# Patient Record
Sex: MALE | Race: WHITE | NOT HISPANIC OR LATINO | Employment: OTHER | URBAN - NONMETROPOLITAN AREA
[De-identification: names, ages, dates, MRNs, and addresses within clinical notes are randomized per-mention and may not be internally consistent; named-entity substitution may affect disease eponyms.]

---

## 2019-12-12 ENCOUNTER — TELEPHONE (OUTPATIENT)
Dept: CARDIOLOGY | Facility: PHYSICIAN GROUP | Age: 79
End: 2019-12-12

## 2019-12-13 ENCOUNTER — OFFICE VISIT (OUTPATIENT)
Dept: CARDIOLOGY | Facility: PHYSICIAN GROUP | Age: 79
End: 2019-12-13
Payer: MEDICARE

## 2019-12-13 VITALS
DIASTOLIC BLOOD PRESSURE: 84 MMHG | HEIGHT: 72 IN | BODY MASS INDEX: 27.09 KG/M2 | WEIGHT: 200 LBS | SYSTOLIC BLOOD PRESSURE: 116 MMHG | HEART RATE: 64 BPM | OXYGEN SATURATION: 95 %

## 2019-12-13 DIAGNOSIS — I10 ESSENTIAL HYPERTENSION: ICD-10-CM

## 2019-12-13 DIAGNOSIS — Z00.00 HEALTHCARE MAINTENANCE: ICD-10-CM

## 2019-12-13 DIAGNOSIS — R00.1 BRADYCARDIA: ICD-10-CM

## 2019-12-13 DIAGNOSIS — R55 SYNCOPE, UNSPECIFIED SYNCOPE TYPE: ICD-10-CM

## 2019-12-13 DIAGNOSIS — Z95.0 CARDIAC PACEMAKER IN SITU: ICD-10-CM

## 2019-12-13 PROCEDURE — 99204 OFFICE O/P NEW MOD 45 MIN: CPT | Performed by: INTERNAL MEDICINE

## 2019-12-13 RX ORDER — AMLODIPINE BESYLATE 10 MG/1
10 TABLET ORAL DAILY
COMMUNITY
End: 2020-03-12

## 2019-12-13 RX ORDER — LOSARTAN POTASSIUM 100 MG/1
100 TABLET ORAL DAILY
COMMUNITY
End: 2022-08-02

## 2019-12-13 RX ORDER — COVID-19 ANTIGEN TEST
KIT MISCELLANEOUS
COMMUNITY
End: 2020-03-12

## 2019-12-13 ASSESSMENT — ENCOUNTER SYMPTOMS
WEIGHT GAIN: 0
COUGH: 0
DEPRESSION: 0
FLANK PAIN: 0
DIARRHEA: 0
DYSPNEA ON EXERTION: 0
ALTERED MENTAL STATUS: 0
ABDOMINAL PAIN: 0
CLAUDICATION: 0
ORTHOPNEA: 0
SYNCOPE: 0
BACK PAIN: 0
VOMITING: 0
HEARTBURN: 0
DIZZINESS: 0
SHORTNESS OF BREATH: 0
PND: 0
NEAR-SYNCOPE: 0
BLURRED VISION: 0
IRREGULAR HEARTBEAT: 0
CONSTIPATION: 0
WEIGHT LOSS: 0
NAUSEA: 0
PALPITATIONS: 0
FEVER: 0
DECREASED APPETITE: 0

## 2019-12-14 NOTE — ASSESSMENT & PLAN NOTE
Over medicated. Light headed and orthostatic. D/c HCTZ. Continue losartan. Will check BP at home and modify accordingly.

## 2019-12-14 NOTE — PROGRESS NOTES
Cardiology Note    Chief Complaint   Patient presents with   • Bradycardia     pacemaker       History of Present Illness: Nolberto Severino is a 79 y.o. 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    Describes had been light headed and suffered syncope prior to receiving medtronic pacemaker from Dr Hill. Now resolved. Occasionally does get light headed; especially with standing. He is able to ambulate and exert without cardiac symptoms.     Review of Systems   Constitution: Negative for decreased appetite, fever, malaise/fatigue, weight gain and weight loss.   HENT: Negative for congestion and nosebleeds.    Eyes: Negative for blurred vision.   Cardiovascular: Negative for chest pain, claudication, dyspnea on exertion, irregular heartbeat, leg swelling, near-syncope, orthopnea, palpitations, paroxysmal nocturnal dyspnea and syncope.   Respiratory: Negative for cough and shortness of breath.    Endocrine: Negative for cold intolerance and heat intolerance.   Skin: Negative for rash.   Musculoskeletal: Negative for back pain.   Gastrointestinal: Negative for abdominal pain, constipation, diarrhea, heartburn, melena, nausea and vomiting.   Genitourinary: Negative for dysuria, flank pain and hematuria.   Neurological: Negative for dizziness.   Psychiatric/Behavioral: Negative for altered mental status and depression.         History reviewed. No pertinent past medical history.      History reviewed. No pertinent surgical history.      Current Outpatient Medications   Medication Sig Dispense Refill   • losartan (COZAAR) 100 MG Tab Take 100 mg by mouth every day.     • metFORMIN (GLUCOPHAGE) 500 MG Tab Take 500 mg by mouth every day.     • amLODIPine (NORVASC) 10 MG Tab Take 10 mg by mouth every day.     • Naproxen Sodium (ALEVE) 220 MG Cap Take  by mouth.     • aspirin 81 MG tablet Take 81 mg by mouth every day.       No current  facility-administered medications for this visit.          No Known Allergies      History reviewed. No pertinent family history.      Social History     Socioeconomic History   • Marital status: Not on file     Spouse name: Not on file   • Number of children: Not on file   • Years of education: Not on file   • Highest education level: Not on file   Occupational History   • Not on file   Social Needs   • Financial resource strain: Not on file   • Food insecurity:     Worry: Not on file     Inability: Not on file   • Transportation needs:     Medical: Not on file     Non-medical: Not on file   Tobacco Use   • Smoking status: Never Smoker   • Smokeless tobacco: Never Used   Substance and Sexual Activity   • Alcohol use: Not Currently   • Drug use: Never   • Sexual activity: Not on file   Lifestyle   • Physical activity:     Days per week: Not on file     Minutes per session: Not on file   • Stress: Not on file   Relationships   • Social connections:     Talks on phone: Not on file     Gets together: Not on file     Attends Catholic service: Not on file     Active member of club or organization: Not on file     Attends meetings of clubs or organizations: Not on file     Relationship status: Not on file   • Intimate partner violence:     Fear of current or ex partner: Not on file     Emotionally abused: Not on file     Physically abused: Not on file     Forced sexual activity: Not on file   Other Topics Concern   • Not on file   Social History Narrative   • Not on file         Physical Exam:  Ambulatory Vitals  /84 (BP Location: Left arm, Patient Position: Sitting, BP Cuff Size: Adult)   Pulse 64   Ht 1.829 m (6')   Wt 90.7 kg (200 lb)   SpO2 95%    BP Readings from Last 4 Encounters:   12/13/19 116/84     Weight/BMI:   Vitals:    12/13/19 1540   BP: 116/84   Weight: 90.7 kg (200 lb)   Height: 1.829 m (6')    Body mass index is 27.12 kg/m².  Wt Readings from Last 4 Encounters:   12/13/19 90.7 kg (200 lb)        Physical Exam   Constitutional: He is oriented to person, place, and time and well-developed, well-nourished, and in no distress. No distress.   HENT:   Head: Normocephalic and atraumatic.   Eyes: Pupils are equal, round, and reactive to light. Conjunctivae are normal.   Neck: Normal range of motion. Neck supple. No JVD present.   Cardiovascular: Normal rate, regular rhythm, normal heart sounds and intact distal pulses. Exam reveals no gallop and no friction rub.   No murmur heard.  Pulmonary/Chest: Effort normal and breath sounds normal. No respiratory distress. He has no wheezes. He has no rales. He exhibits no tenderness.   Abdominal: Soft. Bowel sounds are normal. He exhibits no distension.   Musculoskeletal:         General: No edema.   Neurological: He is alert and oriented to person, place, and time.   Skin: Skin is warm and dry.   Psychiatric: Affect and judgment normal.       Lab Data Review:  No results found for: CHOLSTRLTOT, LDL, HDL, TRIGLYCERIDE    No results found for: SODIUM, POTASSIUM, CHLORIDE, CO2, GLUCOSE, BUN, CREATININE, BUNCREATRAT, GLOMRATE  CrCl cannot be calculated (No successful lab value found.).  No results found for: ALKPHOSPHAT, ASTSGOT, ALTSGPT, TBILIRUBIN   No results found for: WBC, HCT  No results found for: HBA1C  No components found for: TROP      Cardiac Imaging and Procedures Review:        Medical Decision Making:  Problem List Items Addressed This Visit     Syncope     Now resolved post PPM.         Relevant Medications    losartan (COZAAR) 100 MG Tab    amLODIPine (NORVASC) 10 MG Tab    Other Relevant Orders    EKG    Cardiac pacemaker in situ     Normally functioning. Indication: SSS. Interrogate Feb.          Essential hypertension     Over medicated. Light headed and orthostatic. D/c HCTZ. Continue losartan. Will check BP at home and modify accordingly.          Relevant Medications    losartan (COZAAR) 100 MG Tab    amLODIPine (NORVASC) 10 MG Tab    Healthcare  maintenance     Check lipids and a1c.           Other Visit Diagnoses     Bradycardia        Relevant Orders    EKG        40 min spent reviewing old records. Obtained some from Dr Hill's office. Still await echo, stress, labs.    It was my pleasure to meet with Mr. Severino.

## 2019-12-16 ENCOUNTER — TELEPHONE (OUTPATIENT)
Dept: CARDIOLOGY | Facility: MEDICAL CENTER | Age: 79
End: 2019-12-16

## 2019-12-16 NOTE — TELEPHONE ENCOUNTER
[facility]- Dr. Hill's office sent a correspondence stating they do not have any echo's, stress reports or lab results under the pt.s chart. Previously received records were sent to scanning on 12/13.

## 2020-02-24 NOTE — TELEPHONE ENCOUNTER
Records received from Dr. Wilkinson ([facility]) including echo and lab results.   T: [phone]  F: [phone]    Sent to scanning.

## 2020-03-12 ENCOUNTER — OFFICE VISIT (OUTPATIENT)
Dept: CARDIOLOGY | Facility: PHYSICIAN GROUP | Age: 80
End: 2020-03-12
Payer: MEDICARE

## 2020-03-12 ENCOUNTER — NON-PROVIDER VISIT (OUTPATIENT)
Dept: CARDIOLOGY | Facility: PHYSICIAN GROUP | Age: 80
End: 2020-03-12
Payer: MEDICARE

## 2020-03-12 VITALS
DIASTOLIC BLOOD PRESSURE: 54 MMHG | HEIGHT: 72 IN | SYSTOLIC BLOOD PRESSURE: 118 MMHG | OXYGEN SATURATION: 97 % | RESPIRATION RATE: 16 BRPM | HEART RATE: 86 BPM | WEIGHT: 193 LBS | BODY MASS INDEX: 26.14 KG/M2

## 2020-03-12 VITALS
HEIGHT: 72 IN | RESPIRATION RATE: 16 BRPM | OXYGEN SATURATION: 97 % | HEART RATE: 86 BPM | DIASTOLIC BLOOD PRESSURE: 54 MMHG | BODY MASS INDEX: 26.14 KG/M2 | WEIGHT: 193 LBS | SYSTOLIC BLOOD PRESSURE: 118 MMHG

## 2020-03-12 DIAGNOSIS — I95.1 ORTHOSTASIS: ICD-10-CM

## 2020-03-12 DIAGNOSIS — I48.0 PAROXYSMAL ATRIAL FIBRILLATION (HCC): ICD-10-CM

## 2020-03-12 DIAGNOSIS — Z95.0 CARDIAC PACEMAKER IN SITU: ICD-10-CM

## 2020-03-12 DIAGNOSIS — R00.1 BRADYCARDIA: ICD-10-CM

## 2020-03-12 DIAGNOSIS — Z91.89 10 YEAR RISK OF MI OR STROKE 7.5% OR GREATER: ICD-10-CM

## 2020-03-12 DIAGNOSIS — I10 ESSENTIAL HYPERTENSION: ICD-10-CM

## 2020-03-12 DIAGNOSIS — R55 SYNCOPE, UNSPECIFIED SYNCOPE TYPE: ICD-10-CM

## 2020-03-12 PROCEDURE — 99215 OFFICE O/P EST HI 40 MIN: CPT | Mod: 25 | Performed by: INTERNAL MEDICINE

## 2020-03-12 PROCEDURE — 93279 PRGRMG DEV EVAL PM/LDLS PM: CPT | Performed by: NURSE PRACTITIONER

## 2020-03-12 RX ORDER — ACETAMINOPHEN 500 MG
750 TABLET ORAL EVERY 6 HOURS PRN
Status: DISCONTINUED | OUTPATIENT
Start: 2020-03-12 | End: 2022-11-22

## 2020-03-12 RX ORDER — ATORVASTATIN CALCIUM 40 MG/1
40 TABLET, FILM COATED ORAL
Qty: 90 TAB | Refills: 3 | Status: SHIPPED | OUTPATIENT
Start: 2020-03-12 | End: 2022-08-02

## 2020-03-12 ASSESSMENT — ENCOUNTER SYMPTOMS
DYSPNEA ON EXERTION: 0
FLANK PAIN: 0
CONSTIPATION: 0
CLAUDICATION: 0
NAUSEA: 0
SYNCOPE: 0
DEPRESSION: 0
HEARTBURN: 0
COUGH: 0
NEAR-SYNCOPE: 0
ALTERED MENTAL STATUS: 0
PALPITATIONS: 0
WEIGHT LOSS: 0
FEVER: 0
DECREASED APPETITE: 0
VOMITING: 0
DIZZINESS: 1
WEIGHT GAIN: 0
ORTHOPNEA: 0
ABDOMINAL PAIN: 0
BLURRED VISION: 0
IRREGULAR HEARTBEAT: 0
SHORTNESS OF BREATH: 0
PND: 0
BACK PAIN: 0
DIARRHEA: 0

## 2020-03-12 NOTE — PROGRESS NOTES
"Cardiology Note    Chief Complaint   Patient presents with   • Syncope       History of Present Illness: Nolberto Severino is a 79 y.o. 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    Feeling well, however, still having some complaints of orthostasis. Denies syncope since ppm. No other active cardiac complaints. Compliant with medications. Received his outpatient information.    Hx syncope prior to receiving medtronic pacemaker from Dr Hill. Patient now adds received only one \"wire\" because top chambers fibrillating. Was not started on anticoagulation by his prior doctors.    Review of Systems   Constitution: Negative for decreased appetite, fever, malaise/fatigue, weight gain and weight loss.   HENT: Negative for congestion and nosebleeds.    Eyes: Negative for blurred vision.   Cardiovascular: Negative for chest pain, claudication, dyspnea on exertion, irregular heartbeat, leg swelling, near-syncope, orthopnea, palpitations, paroxysmal nocturnal dyspnea and syncope.   Respiratory: Negative for cough and shortness of breath.    Endocrine: Negative for cold intolerance and heat intolerance.   Skin: Negative for rash.   Musculoskeletal: Negative for back pain.   Gastrointestinal: Negative for abdominal pain, constipation, diarrhea, heartburn, melena, nausea and vomiting.   Genitourinary: Negative for dysuria, flank pain and hematuria.   Neurological: Positive for dizziness.   Psychiatric/Behavioral: Negative for altered mental status and depression.         Past Medical History:   Diagnosis Date   • Bradycardia    • Hypertension    • Syncope          Past Surgical History:   Procedure Laterality Date   • PACEMAKER INSERTION Left 02/08/2019    Medtronic Advisa SR MRI [id number] implanted in [place].         Current Outpatient Medications   Medication Sig Dispense Refill   • apixaban (ELIQUIS) 5mg Tab Take 0.5 Tabs by mouth 2 Times a Day. 180 Tab 3   • " atorvastatin (LIPITOR) 40 MG Tab Take 1 Tab by mouth every bedtime. 90 Tab 3   • losartan (COZAAR) 100 MG Tab Take 100 mg by mouth every day.     • metFORMIN (GLUCOPHAGE) 500 MG Tab Take 500 mg by mouth every day.       Current Facility-Administered Medications   Medication Dose Route Frequency Provider Last Rate Last Dose   • acetaminophen (TYLENOL) tablet 750 mg  750 mg Oral Q6HRS PRN Sergio Garner M.D.             No Known Allergies      No family history on file.      Social History     Socioeconomic History   • Marital status: Not on file     Spouse name: Not on file   • Number of children: Not on file   • Years of education: Not on file   • Highest education level: Not on file   Occupational History   • Not on file   Social Needs   • Financial resource strain: Not on file   • Food insecurity     Worry: Not on file     Inability: Not on file   • Transportation needs     Medical: Not on file     Non-medical: Not on file   Tobacco Use   • Smoking status: Never Smoker   • Smokeless tobacco: Never Used   Substance and Sexual Activity   • Alcohol use: Not Currently   • Drug use: Never   • Sexual activity: Not on file   Lifestyle   • Physical activity     Days per week: Not on file     Minutes per session: Not on file   • Stress: Not on file   Relationships   • Social connections     Talks on phone: Not on file     Gets together: Not on file     Attends Pent[employer]al service: Not on file     Active member of club or organization: Not on file     Attends meetings of clubs or organizations: Not on file     Relationship status: Not on file   • Intimate partner violence     Fear of current or ex partner: Not on file     Emotionally abused: Not on file     Physically abused: Not on file     Forced sexual activity: Not on file   Other Topics Concern   • Not on file   Social History Narrative   • Not on file         Physical Exam:  Ambulatory Vitals  /54 (BP Location: Left arm, Patient Position: Sitting, BP Cuff Size:  Adult)   Pulse 86   Resp 16   Ht 1.829 m (6')   Wt 87.5 kg (193 lb)   SpO2 97%    BP Readings from Last 4 Encounters:   03/12/20 118/54   03/12/20 118/54   12/13/19 116/84     Weight/BMI:   Vitals:    03/12/20 1436   BP: 118/54   Weight: 87.5 kg (193 lb)   Height: 1.829 m (6')    Body mass index is 26.18 kg/m².  Wt Readings from Last 4 Encounters:   03/12/20 87.5 kg (193 lb)   03/12/20 87.5 kg (193 lb)   12/13/19 90.7 kg (200 lb)       Physical Exam   Constitutional: He is oriented to person, place, and time and well-developed, well-nourished, and in no distress. No distress.   HENT:   Head: Normocephalic and atraumatic.   Eyes: Pupils are equal, round, and reactive to light. Conjunctivae are normal.   Neck: Normal range of motion. Neck supple. No JVD present.   Cardiovascular: Normal rate, regular rhythm, normal heart sounds and intact distal pulses. Exam reveals no gallop and no friction rub.   No murmur heard.  Pulmonary/Chest: Effort normal and breath sounds normal. No respiratory distress. He has no wheezes. He has no rales. He exhibits no tenderness.   Abdominal: Soft. Bowel sounds are normal. He exhibits no distension.   Musculoskeletal:         General: No edema.   Neurological: He is alert and oriented to person, place, and time.   Skin: Skin is warm and dry.   Psychiatric: Affect and judgment normal.       Lab Data Review:  No results found for: CHOLSTRLTOT, LDL, HDL, TRIGLYCERIDE    No results found for: SODIUM, POTASSIUM, CHLORIDE, CO2, GLUCOSE, BUN, CREATININE, BUNCREATRAT, GLOMRATE  CrCl cannot be calculated (No successful lab value found.).  No results found for: ALKPHOSPHAT, ASTSGOT, ALTSGPT, TBILIRUBIN   No results found for: WBC, HCT  No results found for: HBA1C  No components found for: TROP    10/2019  a1c 8.0    Outside labs 1/2020  , HDL 37, trip 126, tot 198  Microalb/cr 1.86 wnl    Cardiac Imaging and Procedures Review:      PPM 3/12/20 interrogation advisa MRI single chamber -  normal function  Outside PPM 10/2019 interrogation  - >80%  -AS ~10%    outside EKG 12/2019 - AF,     Outside TTE 2/2019  -LVEF 55%  -mild AR  -mild MR  -mild TR  -RVSP 24 mmHg  -right atrium and left atrium dilated    Medical Decision Making:  Problem List Items Addressed This Visit     Cardiac pacemaker in situ    Essential hypertension    Relevant Medications    apixaban (ELIQUIS) 5mg Tab    atorvastatin (LIPITOR) 40 MG Tab    Bradycardia    Paroxysmal atrial fibrillation (HCC)    Relevant Medications    apixaban (ELIQUIS) 5mg Tab    atorvastatin (LIPITOR) 40 MG Tab    10 year risk of MI or stroke 7.5% or greater    Orthostasis    Relevant Medications    apixaban (ELIQUIS) 5mg Tab    atorvastatin (LIPITOR) 40 MG Tab        Paroxysmal AF - chadsvasc 4 - start eliquis 5mg bid. Check TTE. Change nsaid for pain to tylenol.    HTN - controlled however still with orthostasis. Reduce regimen. Keep track BP at home. Will reassess next visit.    10 year risk ascvd 50% / DM - add high intensity statin    It was my pleasure to meet with Simón Mere.    45 min spent reviewing outside records.

## 2020-03-12 NOTE — PATIENT INSTRUCTIONS
Goal blood pressure <130/80. Keep track at home. Call if persistently elevated.    Atrial Fibrillation  Atrial fibrillation is a type of irregular or rapid heartbeat (arrhythmia). In atrial fibrillation, the heart quivers continuously in a chaotic pattern. This occurs when parts of the heart receive disorganized signals that make the heart unable to pump blood normally. This can increase the risk for stroke, heart failure, and other heart-related conditions. There are different types of atrial fibrillation, including:  · Paroxysmal atrial fibrillation. This type starts suddenly, and it usually stops on its own shortly after it starts.  · Persistent atrial fibrillation. This type often lasts longer than a week. It may stop on its own or with treatment.  · Long-lasting persistent atrial fibrillation. This type lasts longer than 12 months.  · Permanent atrial fibrillation. This type does not go away.  Talk with your health care provider to learn about the type of atrial fibrillation that you have.  What are the causes?  This condition is caused by some heart-related conditions or procedures, including:  · A heart attack.  · Coronary artery disease.  · Heart failure.  · Heart valve conditions.  · High blood pressure.  · Inflammation of the sac that surrounds the heart (pericarditis).  · Heart surgery.  · Certain heart rhythm disorders, such as Olrenzana-Parkinson-White syndrome.  Other causes include:  · Pneumonia.  · Obstructive sleep apnea.  · Blockage of an artery in the lungs (pulmonary embolism, or PE).  · Lung cancer.  · Chronic lung disease.  · Thyroid problems, especially if the thyroid is overactive (hyperthyroidism).  · Caffeine.  · Excessive alcohol use or illegal drug use.  · Use of some medicines, including certain decongestants and diet pills.  Sometimes, the cause cannot be found.  What increases the risk?  This condition is more likely to develop in:  · People who are older in age.  · People who  smoke.  · People who have diabetes mellitus.  · People who are overweight (obese).  · Athletes who exercise vigorously.  What are the signs or symptoms?  Symptoms of this condition include:  · A feeling that your heart is beating rapidly or irregularly.  · A feeling of discomfort or pain in your chest.  · Shortness of breath.  · Sudden light-headedness or weakness.  · Getting tired easily during exercise.  In some cases, there are no symptoms.  How is this diagnosed?  Your health care provider may be able to detect atrial fibrillation when taking your pulse. If detected, this condition may be diagnosed with:  · An electrocardiogram (ECG).  · A Holter monitor test that records your heartbeat patterns over a 24-hour period.  · Transthoracic echocardiogram (TTE) to evaluate how blood flows through your heart.  · Transesophageal echocardiogram (JEANTETE) to view more detailed images of your heart.  · A stress test.  · Imaging tests, such as a CT scan or chest X-ray.  · Blood tests.  How is this treated?  The main goals of treatment are to prevent blood clots from forming and to keep your heart beating at a normal rate and rhythm. The type of treatment that you receive depends on many factors, such as your underlying medical conditions and how you feel when you are experiencing atrial fibrillation.  This condition may be treated with:  · Medicine to slow down the heart rate, bring the heart’s rhythm back to normal, or prevent clots from forming.  · Electrical cardioversion. This is a procedure that resets your heart’s rhythm by delivering a controlled, low-energy shock to the heart through your skin.  · Different types of ablation, such as catheter ablation, catheter ablation with pacemaker, or surgical ablation. These procedures destroy the heart tissues that send abnormal signals. When the pacemaker is used, it is placed under your skin to help your heart beat in a regular rhythm.  Follow these instructions at home:  · Take  over-the counter and prescription medicines only as told by your health care provider.  · If your health care provider prescribed a blood-thinning medicine (anticoagulant), take it exactly as told. Taking too much blood-thinning medicine can cause bleeding. If you do not take enough blood-thinning medicine, you will not have the protection that you need against stroke and other problems.  · Do not use tobacco products, including cigarettes, chewing tobacco, and e-cigarettes. If you need help quitting, ask your health care provider.  · If you have obstructive sleep apnea, manage your condition as told by your health care provider.  · Do not drink alcohol.  · Do not drink beverages that contain caffeine, such as coffee, soda, and tea.  · Maintain a healthy weight. Do not use diet pills unless your health care provider approves. Diet pills may make heart problems worse.  · Follow diet instructions as told by your health care provider.  · Exercise regularly as told by your health care provider.  · Keep all follow-up visits as told by your health care provider. This is important.  How is this prevented?  · Avoid drinking beverages that contain caffeine or alcohol.  · Avoid certain medicines, especially medicines that are used for breathing problems.  · Avoid certain herbs and herbal medicines, such as those that contain ephedra or ginseng.  · Do not use illegal drugs, such as cocaine and amphetamines.  · Do not smoke.  · Manage your high blood pressure.  Contact a health care provider if:  · You notice a change in the rate, rhythm, or strength of your heartbeat.  · You are taking an anticoagulant and you notice increased bruising.  · You tire more easily when you exercise or exert yourself.  Get help right away if:  · You have chest pain, abdominal pain, sweating, or weakness.  · You feel nauseous.  · You notice blood in your vomit, bowel movement, or urine.  · You have shortness of breath.  · You suddenly have swollen  feet and ankles.  · You feel dizzy.  · You have sudden weakness or numbness of the face, arm, or leg, especially on one side of the body.  · You have trouble speaking, trouble understanding, or both (aphasia).  · Your face or your eyelid droops on one side.  These symptoms may represent a serious problem that is an emergency. Do not wait to see if the symptoms will go away. Get medical help right away. Call your local emergency services (911 in the [place] Do not drive yourself to the hospital.   This information is not intended to replace advice given to you by your health care provider. Make sure you discuss any questions you have with your health care provider.  Document Released: 12/18/2006 Document Revised: 04/26/2017 Document Reviewed: 04/13/2016  Elsevier Interactive Patient Education © 2017 [employer]

## 2020-03-12 NOTE — PROGRESS NOTES
Patient had single chamber PM implanted in [place] on 2/8/2019.    Device is working normally. No ventricular high rate episodes.  Normal sensing and capture of RV lead; stable impedance. Battery longevity is 8.5 years.  No changes are made today.    FU in 6 months for next PM check with me.    Collaborating MD: Patsy

## 2020-06-12 ENCOUNTER — APPOINTMENT (OUTPATIENT)
Dept: CARDIOLOGY | Facility: PHYSICIAN GROUP | Age: 80
End: 2020-06-12
Payer: MEDICARE

## 2020-09-11 ENCOUNTER — TELEPHONE (OUTPATIENT)
Dept: CARDIOLOGY | Facility: MEDICAL CENTER | Age: 80
End: 2020-09-11

## 2020-09-11 ENCOUNTER — OFFICE VISIT (OUTPATIENT)
Dept: CARDIOLOGY | Facility: PHYSICIAN GROUP | Age: 80
End: 2020-09-11
Payer: MEDICARE

## 2020-09-11 VITALS
WEIGHT: 188 LBS | OXYGEN SATURATION: 98 % | HEART RATE: 72 BPM | HEIGHT: 72 IN | SYSTOLIC BLOOD PRESSURE: 132 MMHG | BODY MASS INDEX: 25.47 KG/M2 | DIASTOLIC BLOOD PRESSURE: 88 MMHG

## 2020-09-11 DIAGNOSIS — I10 ESSENTIAL HYPERTENSION: ICD-10-CM

## 2020-09-11 DIAGNOSIS — R00.1 BRADYCARDIA: ICD-10-CM

## 2020-09-11 DIAGNOSIS — I48.0 PAROXYSMAL ATRIAL FIBRILLATION (HCC): ICD-10-CM

## 2020-09-11 DIAGNOSIS — Z95.0 CARDIAC PACEMAKER IN SITU: ICD-10-CM

## 2020-09-11 DIAGNOSIS — Z91.89 10 YEAR RISK OF MI OR STROKE 7.5% OR GREATER: ICD-10-CM

## 2020-09-11 PROCEDURE — 99214 OFFICE O/P EST MOD 30 MIN: CPT | Performed by: INTERNAL MEDICINE

## 2020-09-11 ASSESSMENT — ENCOUNTER SYMPTOMS
DYSPNEA ON EXERTION: 0
ALTERED MENTAL STATUS: 0
HEARTBURN: 0
FLANK PAIN: 0
IRREGULAR HEARTBEAT: 0
VOMITING: 0
SYNCOPE: 0
BLURRED VISION: 0
DIARRHEA: 0
COUGH: 0
DEPRESSION: 0
CLAUDICATION: 0
CONSTIPATION: 0
WEIGHT LOSS: 0
NAUSEA: 0
SHORTNESS OF BREATH: 0
DECREASED APPETITE: 0
WEIGHT GAIN: 0
FEVER: 0
NEAR-SYNCOPE: 0
PND: 0
BACK PAIN: 0
PALPITATIONS: 0
DIZZINESS: 0
ORTHOPNEA: 0
ABDOMINAL PAIN: 0

## 2020-09-11 NOTE — TELEPHONE ENCOUNTER
Called patient to see if he has completed echocardiogram ordered by [facility], unable to reach patient and left voicemail for call back.     Faxed records request to [facility] to see if he completed it there. Fax confirmation received.

## 2020-09-11 NOTE — PROGRESS NOTES
"Cardiology Note    Chief Complaint   Patient presents with   • Atrial Fibrillation       History of Present Illness: Nolberto Severino is a 79 y.o. 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    No cardiac complaints this visit. Much improved orthostasis since stopping amlodipine. Still has rarely if bends over and gets back up. Home Bps 130/70s. He also has leg weakness causing him to waver when walking. No repeat syncope since ppm. Compliant with medications and denies adverse effects including new meds.    Hx syncope prior to receiving medtronic pacemaker from Dr Hill. Only one \"wire\" because \"top chambers fibrillating.\"     Review of Systems   Constitution: Negative for decreased appetite, fever, malaise/fatigue, weight gain and weight loss.   HENT: Negative for congestion and nosebleeds.    Eyes: Negative for blurred vision.   Cardiovascular: Negative for chest pain, claudication, dyspnea on exertion, irregular heartbeat, leg swelling, near-syncope, orthopnea, palpitations, paroxysmal nocturnal dyspnea and syncope.   Respiratory: Negative for cough and shortness of breath.    Endocrine: Negative for cold intolerance and heat intolerance.   Skin: Negative for rash.   Musculoskeletal: Negative for back pain.   Gastrointestinal: Negative for abdominal pain, constipation, diarrhea, heartburn, melena, nausea and vomiting.   Genitourinary: Negative for dysuria, flank pain and hematuria.   Neurological: Negative for dizziness.   Psychiatric/Behavioral: Negative for altered mental status and depression.         Past Medical History:   Diagnosis Date   • Bradycardia    • Hypertension    • Syncope          Past Surgical History:   Procedure Laterality Date   • PACEMAKER INSERTION Left 02/08/2019    Medtronic Advisa SR MRI [id number] implanted in [place].         Current Outpatient Medications   Medication Sig Dispense Refill "   • apixaban (ELIQUIS) 5mg Tab Take 0.5 Tabs by mouth 2 Times a Day. 180 Tab 3   • atorvastatin (LIPITOR) 40 MG Tab Take 1 Tab by mouth every bedtime. 90 Tab 3   • losartan (COZAAR) 100 MG Tab Take 100 mg by mouth every day.     • metFORMIN (GLUCOPHAGE) 500 MG Tab Take 500 mg by mouth every day.       Current Facility-Administered Medications   Medication Dose Route Frequency Provider Last Rate Last Dose   • acetaminophen (TYLENOL) tablet 750 mg  750 mg Oral Q6HRS PRN Sergio Garner M.D.             No Known Allergies      History reviewed. No pertinent family history.      Social History     Socioeconomic History   • Marital status:      Spouse name: Not on file   • Number of children: Not on file   • Years of education: Not on file   • Highest education level: Not on file   Occupational History   • Not on file   Social Needs   • Financial resource strain: Not on file   • Food insecurity     Worry: Not on file     Inability: Not on file   • Transportation needs     Medical: Not on file     Non-medical: Not on file   Tobacco Use   • Smoking status: Never Smoker   • Smokeless tobacco: Never Used   Substance and Sexual Activity   • Alcohol use: Not Currently   • Drug use: Never   • Sexual activity: Not on file   Lifestyle   • Physical activity     Days per week: Not on file     Minutes per session: Not on file   • Stress: Not on file   Relationships   • Social connections     Talks on phone: Not on file     Gets together: Not on file     Attends Christian service: Not on file     Active member of club or organization: Not on file     Attends meetings of clubs or organizations: Not on file     Relationship status: Not on file   • Intimate partner violence     Fear of current or ex partner: Not on file     Emotionally abused: Not on file     Physically abused: Not on file     Forced sexual activity: Not on file   Other Topics Concern   • Not on file   Social History Narrative   • Not on file         Physical  Exam:  Ambulatory Vitals  /88 (BP Location: Left arm, Patient Position: Sitting, BP Cuff Size: Adult)   Pulse 72   Ht 1.829 m (6')   Wt 85.3 kg (188 lb)   SpO2 98%    BP Readings from Last 4 Encounters:   09/11/20 132/88   03/12/20 118/54   03/12/20 118/54   12/13/19 116/84     Weight/BMI:   Vitals:    09/11/20 1308   BP: 132/88   Weight: 85.3 kg (188 lb)   Height: 1.829 m (6')    Body mass index is 25.5 kg/m².  Wt Readings from Last 4 Encounters:   09/11/20 85.3 kg (188 lb)   03/12/20 87.5 kg (193 lb)   03/12/20 87.5 kg (193 lb)   12/13/19 90.7 kg (200 lb)       Physical Exam   Constitutional: He is oriented to person, place, and time and well-developed, well-nourished, and in no distress. No distress.   HENT:   Head: Normocephalic and atraumatic.   Eyes: Pupils are equal, round, and reactive to light. Conjunctivae are normal.   Neck: Normal range of motion. Neck supple. No JVD present.   Cardiovascular: Normal rate, regular rhythm, normal heart sounds and intact distal pulses. Exam reveals no gallop and no friction rub.   No murmur heard.  Pulmonary/Chest: Effort normal and breath sounds normal. No respiratory distress. He has no wheezes. He has no rales. He exhibits no tenderness.   Abdominal: Soft. Bowel sounds are normal. He exhibits no distension.   Musculoskeletal:         General: No edema.   Neurological: He is alert and oriented to person, place, and time.   Skin: Skin is warm and dry.   Psychiatric: Affect and judgment normal.       Lab Data Review:  No results found for: CHOLSTRLTOT, LDL, HDL, TRIGLYCERIDE    No results found for: SODIUM, POTASSIUM, CHLORIDE, CO2, GLUCOSE, BUN, CREATININE, BUNCREATRAT, GLOMRATE  CrCl cannot be calculated (No successful lab value found.).  No results found for: ALKPHOSPHAT, ASTSGOT, ALTSGPT, TBILIRUBIN   No results found for: WBC, HCT  No results found for: HBA1C  No components found for: TROP    10/2019  a1c 8.0    Outside labs 1/2020  , HDL 37, trip  126, tot 198  Microalb/cr 1.86 wnl    Cardiac Imaging and Procedures Review:      EKG 3/16/20 reviewed by me AF, CP  outside EKG 12/2019 - AF,     Outside TTE 2/2019  -LVEF 55%  -mild AR  -mild MR  -mild TR  -RVSP 24 mmHg  -right atrium and left atrium dilated    Medical Decision Making:  Problem List Items Addressed This Visit     Cardiac pacemaker in situ    Essential hypertension    Bradycardia    Paroxysmal atrial fibrillation (HCC)    10 year risk of MI or stroke 7.5% or greater    Relevant Orders    LIPID PANEL        Persistent AF - chadsvasc 4 - eliquis 5mg bid for cva prevention.     HTN - adequate control with minimal orthostasis. Continue current regimen.    10 year risk ascvd 50% / DM - high intensity statin. Repeat lipids.    It was my pleasure to meet with Mr. Severino.

## 2020-09-11 NOTE — TELEPHONE ENCOUNTER
Received fax from [facility] stating they do not have an echocardiogram for patient.

## 2020-09-17 ENCOUNTER — APPOINTMENT (OUTPATIENT)
Dept: CARDIOLOGY | Facility: PHYSICIAN GROUP | Age: 80
End: 2020-09-17
Payer: MEDICARE

## 2020-12-03 ENCOUNTER — NON-PROVIDER VISIT (OUTPATIENT)
Dept: CARDIOLOGY | Facility: PHYSICIAN GROUP | Age: 80
End: 2020-12-03
Payer: MEDICARE

## 2020-12-03 VITALS
BODY MASS INDEX: 24.11 KG/M2 | HEIGHT: 72 IN | OXYGEN SATURATION: 97 % | HEART RATE: 92 BPM | DIASTOLIC BLOOD PRESSURE: 64 MMHG | WEIGHT: 178 LBS | SYSTOLIC BLOOD PRESSURE: 118 MMHG

## 2020-12-03 DIAGNOSIS — Z95.0 CARDIAC PACEMAKER IN SITU: ICD-10-CM

## 2020-12-03 DIAGNOSIS — I48.0 PAROXYSMAL ATRIAL FIBRILLATION (HCC): ICD-10-CM

## 2020-12-03 DIAGNOSIS — R00.1 BRADYCARDIA: ICD-10-CM

## 2020-12-03 PROCEDURE — 99999 PR NO CHARGE: CPT | Performed by: NURSE PRACTITIONER

## 2020-12-04 NOTE — PROGRESS NOTES
Patient was rescheduled due to recent hospitalization for recent positive Covid diagnosis.  YIFAN Rodríguez

## 2021-01-07 ENCOUNTER — NON-PROVIDER VISIT (OUTPATIENT)
Dept: CARDIOLOGY | Facility: PHYSICIAN GROUP | Age: 81
End: 2021-01-07
Payer: MEDICARE

## 2021-01-07 VITALS
BODY MASS INDEX: 26.41 KG/M2 | OXYGEN SATURATION: 95 % | WEIGHT: 195 LBS | SYSTOLIC BLOOD PRESSURE: 142 MMHG | HEART RATE: 82 BPM | HEIGHT: 72 IN | DIASTOLIC BLOOD PRESSURE: 68 MMHG

## 2021-01-07 DIAGNOSIS — R00.1 BRADYCARDIA: ICD-10-CM

## 2021-01-07 DIAGNOSIS — Z95.0 CARDIAC PACEMAKER IN SITU: ICD-10-CM

## 2021-01-07 DIAGNOSIS — I48.0 PAROXYSMAL ATRIAL FIBRILLATION (HCC): ICD-10-CM

## 2021-01-07 PROCEDURE — 93279 PRGRMG DEV EVAL PM/LDLS PM: CPT | Performed by: NURSE PRACTITIONER

## 2021-01-07 NOTE — PROGRESS NOTES
Device is working normally. No ventricular high rate episodes.  Normal sensing and capture of RV lead; stable impedance. Battery longevity is 8.5 years.  No changes are made today.    FU in 6 months for next PM check with me, as well as FU with Dr. Garner.

## 2021-08-05 ENCOUNTER — NON-PROVIDER VISIT (OUTPATIENT)
Dept: CARDIOLOGY | Facility: PHYSICIAN GROUP | Age: 81
End: 2021-08-05
Payer: MEDICARE

## 2021-08-05 ENCOUNTER — OFFICE VISIT (OUTPATIENT)
Dept: CARDIOLOGY | Facility: PHYSICIAN GROUP | Age: 81
End: 2021-08-05
Payer: MEDICARE

## 2021-08-05 VITALS
HEIGHT: 72 IN | BODY MASS INDEX: 28.31 KG/M2 | DIASTOLIC BLOOD PRESSURE: 60 MMHG | SYSTOLIC BLOOD PRESSURE: 118 MMHG | HEART RATE: 66 BPM | RESPIRATION RATE: 12 BRPM | WEIGHT: 209 LBS | OXYGEN SATURATION: 97 %

## 2021-08-05 VITALS
WEIGHT: 209 LBS | RESPIRATION RATE: 12 BRPM | HEIGHT: 72 IN | SYSTOLIC BLOOD PRESSURE: 118 MMHG | BODY MASS INDEX: 28.31 KG/M2 | HEART RATE: 66 BPM | OXYGEN SATURATION: 97 % | DIASTOLIC BLOOD PRESSURE: 60 MMHG

## 2021-08-05 DIAGNOSIS — R00.1 BRADYCARDIA: ICD-10-CM

## 2021-08-05 DIAGNOSIS — I10 ESSENTIAL HYPERTENSION: ICD-10-CM

## 2021-08-05 DIAGNOSIS — Z91.89 10 YEAR RISK OF MI OR STROKE 7.5% OR GREATER: ICD-10-CM

## 2021-08-05 DIAGNOSIS — I48.0 PAROXYSMAL ATRIAL FIBRILLATION (HCC): ICD-10-CM

## 2021-08-05 DIAGNOSIS — Z00.00 HEALTHCARE MAINTENANCE: ICD-10-CM

## 2021-08-05 DIAGNOSIS — Z95.0 CARDIAC PACEMAKER IN SITU: ICD-10-CM

## 2021-08-05 DIAGNOSIS — E11.9 TYPE 2 DIABETES MELLITUS WITHOUT COMPLICATION, UNSPECIFIED WHETHER LONG TERM INSULIN USE (HCC): ICD-10-CM

## 2021-08-05 PROCEDURE — 93279 PRGRMG DEV EVAL PM/LDLS PM: CPT | Performed by: NURSE PRACTITIONER

## 2021-08-05 PROCEDURE — 99214 OFFICE O/P EST MOD 30 MIN: CPT | Mod: 25 | Performed by: INTERNAL MEDICINE

## 2021-08-05 ASSESSMENT — ENCOUNTER SYMPTOMS
DYSPNEA ON EXERTION: 0
ALTERED MENTAL STATUS: 0
WEIGHT GAIN: 0
SYNCOPE: 0
ORTHOPNEA: 0
VOMITING: 0
FLANK PAIN: 0
NEAR-SYNCOPE: 0
DIZZINESS: 0
FEVER: 0
IRREGULAR HEARTBEAT: 0
BACK PAIN: 0
CLAUDICATION: 0
HEARTBURN: 0
PALPITATIONS: 0
PND: 0
COUGH: 0
DEPRESSION: 0
DECREASED APPETITE: 0
NAUSEA: 0
ABDOMINAL PAIN: 0
SHORTNESS OF BREATH: 0
DIARRHEA: 0
BLURRED VISION: 0
WEIGHT LOSS: 0
CONSTIPATION: 0

## 2021-08-05 NOTE — PROGRESS NOTES
"Cardiology Note    Chief Complaint   Patient presents with   • Hypertension     F/V Dx: Essential hypertension   • Bradycardia   • Atrial Fibrillation     F/V Dx: Paroxysmal atrial fibrillation (HCC)       History of Present Illness: Nolberto Severino is a 80 y.o. 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    [facility] admission 11/2020 for pneumonia, covid-19 positive.    Has since recovered. No active cardiac complaints. At his baseline level of ba. Has difficulty walking perfectly straight. Reminds had hip surgery and since has had this issue. Compliant with medications and denies adverse effects.   Hx syncope prior to receiving medtronic pacemaker from Dr Hill. Only one \"wire\" because \"top chambers fibrillating.\"     Review of Systems   Constitutional: Negative for decreased appetite, fever, malaise/fatigue, weight gain and weight loss.   HENT: Negative for congestion and nosebleeds.    Eyes: Negative for blurred vision.   Cardiovascular: Negative for chest pain, claudication, dyspnea on exertion, irregular heartbeat, leg swelling, near-syncope, orthopnea, palpitations, paroxysmal nocturnal dyspnea and syncope.   Respiratory: Negative for cough and shortness of breath.    Endocrine: Negative for cold intolerance and heat intolerance.   Skin: Negative for rash.   Musculoskeletal: Negative for back pain.   Gastrointestinal: Negative for abdominal pain, constipation, diarrhea, heartburn, melena, nausea and vomiting.   Genitourinary: Negative for dysuria, flank pain and hematuria.   Neurological: Negative for dizziness.   Psychiatric/Behavioral: Negative for altered mental status and depression.         Past Medical History:   Diagnosis Date   • Bradycardia    • Hypertension    • Syncope          Past Surgical History:   Procedure Laterality Date   • PACEMAKER INSERTION Left 02/08/2019    Medtronic Advisa SR MRI [id number] implanted in [place].         Current " Outpatient Medications   Medication Sig Dispense Refill   • apixaban (ELIQUIS) 5mg Tab Take 1 tablet by mouth 2 times a day. 180 tablet 1   • atorvastatin (LIPITOR) 40 MG Tab Take 1 Tab by mouth every bedtime. 90 Tab 3   • losartan (COZAAR) 100 MG Tab Take 100 mg by mouth every day.     • metFORMIN (GLUCOPHAGE) 500 MG Tab Take 500 mg by mouth every day.       Current Facility-Administered Medications   Medication Dose Route Frequency Provider Last Rate Last Admin   • acetaminophen (TYLENOL) tablet 750 mg  750 mg Oral Q6HRS PRN Sergio Garner M.D.             No Known Allergies      No family history on file.      Social History     Socioeconomic History   • Marital status:      Spouse name: Not on file   • Number of children: Not on file   • Years of education: Not on file   • Highest education level: Not on file   Occupational History   • Not on file   Tobacco Use   • Smoking status: Former Smoker     Packs/day: 0.00     Types: Cigarettes     Quit date: 2018     Years since quitting: 3.5   • Smokeless tobacco: Never Used   Substance and Sexual Activity   • Alcohol use: Not Currently   • Drug use: Never   • Sexual activity: Not on file   Other Topics Concern   • Not on file   Social History Narrative   • Not on file     Social Determinants of Health     Financial Resource Strain:    • Difficulty of Paying Living Expenses:    Food Insecurity:    • Worried About Running Out of Food in the Last Year:    • Ran Out of Food in the Last Year:    Transportation Needs:    • Lack of Transportation (Medical):    • Lack of Transportation (Non-Medical):    Physical Activity:    • Days of Exercise per Week:    • Minutes of Exercise per Session:    Stress:    • Feeling of Stress :    Social Connections:    • Frequency of Communication with Friends and Family:    • Frequency of Social Gatherings with Friends and Family:    • Attends Gnosticist Services:    • Active Member of Clubs or Organizations:    • Attends Club or  Organization Meetings:    • Marital Status:    Intimate Partner Violence:    • Fear of Current or Ex-Partner:    • Emotionally Abused:    • Physically Abused:    • Sexually Abused:          Physical Exam:  Ambulatory Vitals  /60 (BP Location: Left arm, Patient Position: Sitting, BP Cuff Size: Adult)   Pulse 66   Resp 12   Ht 1.829 m (6')   Wt 94.8 kg (209 lb)   SpO2 97%    BP Readings from Last 4 Encounters:   08/05/21 118/60   08/05/21 118/60   01/07/21 142/68   12/03/20 118/64     Weight/BMI:   Vitals:    08/05/21 1054   BP: 118/60   Weight: 94.8 kg (209 lb)   Height: 1.829 m (6')    Body mass index is 28.35 kg/m².  Wt Readings from Last 4 Encounters:   08/05/21 94.8 kg (209 lb)   08/05/21 94.8 kg (209 lb)   01/07/21 88.5 kg (195 lb)   12/03/20 80.7 kg (178 lb)       Physical Exam  Constitutional:       General: He is not in acute distress.  HENT:      Head: Normocephalic and atraumatic.   Eyes:      Conjunctiva/sclera: Conjunctivae normal.      Pupils: Pupils are equal, round, and reactive to light.   Neck:      Vascular: No JVD.   Cardiovascular:      Rate and Rhythm: Normal rate and regular rhythm.      Heart sounds: Normal heart sounds. No murmur heard.   No friction rub. No gallop.    Pulmonary:      Effort: Pulmonary effort is normal. No respiratory distress.      Breath sounds: Normal breath sounds. No wheezing or rales.   Chest:      Chest wall: No tenderness.   Abdominal:      General: Bowel sounds are normal. There is no distension.      Palpations: Abdomen is soft.   Musculoskeletal:      Cervical back: Normal range of motion and neck supple.   Skin:     General: Skin is warm and dry.   Neurological:      Mental Status: He is alert and oriented to person, place, and time.   Psychiatric:         Mood and Affect: Affect normal.         Judgment: Judgment normal.         Lab Data Review:  No results found for: CHOLSTRLTOT, LDL, HDL, TRIGLYCERIDE    No results found for: SODIUM, POTASSIUM,  CHLORIDE, CO2, GLUCOSE, BUN, CREATININE, BUNCREATRAT, GLOMRATE  CrCl cannot be calculated (No successful lab value found.).  No results found for: ALKPHOSPHAT, ASTSGOT, ALTSGPT, TBILIRUBIN   No results found for: WBC, HCT  No results found for: HBA1C  No components found for: TROP    10/2019  a1c 8.0    Outside labs 1/2020  , HDL 37, trip 126, tot 198  Microalb/cr 1.86 wnl    Cardiac Imaging and Procedures Review:      EKG 3/16/20 reviewed by me AF, CP  outside EKG 12/2019 - AF,     Outside TTE 2/2019  -LVEF 55%  -mild AR  -mild MR  -mild TR  -RVSP 24 mmHg  -right atrium and left atrium dilated    Medical Decision Making:  Problem List Items Addressed This Visit     Essential hypertension    Healthcare maintenance    Paroxysmal atrial fibrillation (HCC)    10 year risk of MI or stroke 7.5% or greater    Type 2 diabetes mellitus without complication (HCC)    Relevant Orders    Lipid Profile        Persistent AF - chadsvasc 4 - eliquis 5mg bid for cva prevention. Hasn't required rate control.    HTN - goal 120/80. Chronic. Controlled. Continue current regimen.    10 year risk ascvd high / DM - high intensity statin. Repeat lipids.    It was my pleasure to meet with Mr. Severino.

## 2021-08-05 NOTE — PATIENT INSTRUCTIONS

## 2021-08-05 NOTE — PROGRESS NOTES
Device is working normally. No ventricular high rate episodes.  Normal sensing and capture of RV lead; stable impedance. Battery longevity is 7.5 years.  No changes are made today.    He does see Dr. Garner today too.    FU in 6 months for next PM check with me.

## 2022-01-24 ENCOUNTER — TELEPHONE (OUTPATIENT)
Dept: CARDIOLOGY | Facility: MEDICAL CENTER | Age: 82
End: 2022-01-24

## 2022-01-24 NOTE — TELEPHONE ENCOUNTER
Called patient in regards to upcoming appointment scheduled on 02/10 with AB. Called regarding pending labs that were ordered per VR back in 08/2021. Patient stated he has not completed it but can complete before visit at [facility].     Printed copy of panel and physically took orders to Lab Services at [facility].     Informed patient labs are fasting and to attempt to complete before visit.   Patient verbally understood and was appreciative of call.     Confirmed appointment date, time, & location. Advised to please wear a mask and to call main office if their were any questions or concerns.

## 2022-02-10 ENCOUNTER — NON-PROVIDER VISIT (OUTPATIENT)
Dept: CARDIOLOGY | Facility: PHYSICIAN GROUP | Age: 82
End: 2022-02-10
Payer: MEDICARE

## 2022-02-10 VITALS
HEART RATE: 68 BPM | DIASTOLIC BLOOD PRESSURE: 80 MMHG | BODY MASS INDEX: 28.71 KG/M2 | OXYGEN SATURATION: 96 % | HEIGHT: 72 IN | SYSTOLIC BLOOD PRESSURE: 136 MMHG | WEIGHT: 212 LBS | RESPIRATION RATE: 16 BRPM

## 2022-02-10 DIAGNOSIS — Z79.01 CHRONIC ANTICOAGULATION: ICD-10-CM

## 2022-02-10 DIAGNOSIS — Z95.0 CARDIAC PACEMAKER IN SITU: ICD-10-CM

## 2022-02-10 DIAGNOSIS — R00.1 BRADYCARDIA: ICD-10-CM

## 2022-02-10 DIAGNOSIS — I48.0 PAROXYSMAL ATRIAL FIBRILLATION (HCC): ICD-10-CM

## 2022-02-10 PROCEDURE — 93280 PM DEVICE PROGR EVAL DUAL: CPT | Performed by: NURSE PRACTITIONER

## 2022-02-10 RX ORDER — TAMSULOSIN HYDROCHLORIDE 0.4 MG/1
0.4 CAPSULE ORAL DAILY
COMMUNITY
Start: 2022-01-12 | End: 2022-09-14

## 2022-02-10 RX ORDER — BLOOD-GLUCOSE METER
KIT MISCELLANEOUS
Status: ON HOLD | COMMUNITY
Start: 2021-12-16 | End: 2023-01-09

## 2022-02-10 RX ORDER — INSULIN ASPART 100 [IU]/ML
10 INJECTION, SOLUTION INTRAVENOUS; SUBCUTANEOUS PRN
Status: ON HOLD | COMMUNITY
Start: 2022-01-17 | End: 2023-01-09

## 2022-02-10 RX ORDER — AMLODIPINE BESYLATE 10 MG/1
10 TABLET ORAL DAILY
COMMUNITY
Start: 2022-02-03 | End: 2022-08-02

## 2022-02-10 NOTE — PROGRESS NOTES
Device is working normally. Normal sensing and capture of RV lead; stable impedance. Battery longevity is 7.5 years.  No changes are made today.    He is anticoagulated with Eliquis, but is taking it only once daily. I explained it is a twice daily medication, and he states understanding.    Follow-up in 6 months for next PM check with me, and follow-up with Dr. Garner.

## 2022-08-02 ENCOUNTER — OFFICE VISIT (OUTPATIENT)
Dept: CARDIOLOGY | Facility: MEDICAL CENTER | Age: 82
End: 2022-08-02
Payer: MEDICARE

## 2022-08-02 ENCOUNTER — TELEPHONE (OUTPATIENT)
Dept: CARDIOLOGY | Facility: MEDICAL CENTER | Age: 82
End: 2022-08-02
Payer: MEDICARE

## 2022-08-02 VITALS
WEIGHT: 191.8 LBS | RESPIRATION RATE: 18 BRPM | BODY MASS INDEX: 25.98 KG/M2 | SYSTOLIC BLOOD PRESSURE: 136 MMHG | HEIGHT: 72 IN | DIASTOLIC BLOOD PRESSURE: 84 MMHG | OXYGEN SATURATION: 96 % | HEART RATE: 74 BPM

## 2022-08-02 DIAGNOSIS — E11.9 TYPE 2 DIABETES MELLITUS WITHOUT COMPLICATION, UNSPECIFIED WHETHER LONG TERM INSULIN USE (HCC): ICD-10-CM

## 2022-08-02 DIAGNOSIS — I48.0 PAROXYSMAL ATRIAL FIBRILLATION (HCC): ICD-10-CM

## 2022-08-02 DIAGNOSIS — I25.10 CORONARY ARTERY DISEASE INVOLVING NATIVE CORONARY ARTERY OF NATIVE HEART WITHOUT ANGINA PECTORIS: ICD-10-CM

## 2022-08-02 DIAGNOSIS — I10 HTN (HYPERTENSION), MALIGNANT: ICD-10-CM

## 2022-08-02 DIAGNOSIS — Z95.5 STENTED CORONARY ARTERY: ICD-10-CM

## 2022-08-02 DIAGNOSIS — E78.2 MIXED HYPERLIPIDEMIA: ICD-10-CM

## 2022-08-02 DIAGNOSIS — Z95.0 CARDIAC PACEMAKER IN SITU: ICD-10-CM

## 2022-08-02 PROCEDURE — 99215 OFFICE O/P EST HI 40 MIN: CPT | Performed by: INTERNAL MEDICINE

## 2022-08-02 PROCEDURE — 93000 ELECTROCARDIOGRAM COMPLETE: CPT | Performed by: INTERNAL MEDICINE

## 2022-08-02 RX ORDER — FUROSEMIDE 40 MG/1
40 TABLET ORAL DAILY
COMMUNITY
End: 2022-08-24

## 2022-08-02 RX ORDER — SPIRONOLACTONE 25 MG/1
25 TABLET ORAL DAILY
COMMUNITY

## 2022-08-02 RX ORDER — CLOPIDOGREL BISULFATE 75 MG/1
75 TABLET ORAL DAILY
COMMUNITY

## 2022-08-02 RX ORDER — CARVEDILOL 3.12 MG/1
3.12 TABLET ORAL 2 TIMES DAILY WITH MEALS
COMMUNITY
End: 2022-08-02

## 2022-08-02 RX ORDER — INSULIN DEGLUDEC 100 U/ML
INJECTION, SOLUTION SUBCUTANEOUS
Status: ON HOLD | COMMUNITY
End: 2023-01-09

## 2022-08-02 RX ORDER — LISINOPRIL 5 MG/1
5 TABLET ORAL DAILY
COMMUNITY
End: 2022-08-24

## 2022-08-02 RX ORDER — DAPAGLIFLOZIN 10 MG/1
10 TABLET, FILM COATED ORAL DAILY
Qty: 90 TABLET | Refills: 3 | Status: SHIPPED | OUTPATIENT
Start: 2022-08-02

## 2022-08-02 RX ORDER — CARVEDILOL 6.25 MG/1
6.25 TABLET ORAL 2 TIMES DAILY WITH MEALS
Qty: 180 TABLET | Refills: 3 | Status: SHIPPED | OUTPATIENT
Start: 2022-08-02 | End: 2022-08-24

## 2022-08-02 RX ORDER — CARVEDILOL 6.25 MG/1
6.25 TABLET ORAL 2 TIMES DAILY WITH MEALS
Qty: 60 TABLET | Refills: 11 | Status: SHIPPED | OUTPATIENT
Start: 2022-08-02 | End: 2022-08-02 | Stop reason: SDUPTHER

## 2022-08-02 RX ORDER — GABAPENTIN 300 MG/1
600 CAPSULE ORAL 3 TIMES DAILY
COMMUNITY

## 2022-08-02 RX ORDER — PREDNISONE 20 MG/1
20 TABLET ORAL DAILY
COMMUNITY
End: 2022-08-24

## 2022-08-02 RX ORDER — ATORVASTATIN CALCIUM 80 MG/1
80 TABLET, FILM COATED ORAL NIGHTLY
COMMUNITY

## 2022-08-02 NOTE — TELEPHONE ENCOUNTER
Patient reported was at [facility] I had send Medical Records Request to   [phone]  Phone Number [phone]  Confirmation has been received.

## 2022-08-02 NOTE — PROGRESS NOTES
Chief Complaint   Patient presents with   • Bradycardia   • Atrial Fibrillation     F/V Dx: Paroxysmal atrial fibrillation (HCC)       • Hypertension     F/V Dx: Essential hypertension           Subjective     Nolberto Severino is a 81 y.o. male who presents today for cardiac care and evaluation in order cardiology clinic after recent hospitalization at [facility].  Per patient's report, he underwent coronary stenting with unknown distribution.  Patient does not have any stent card.  Patient was also told that he might of had heart failure as well.  However, at this time we do not have any information for records from [place] [facility] for review.    He denies having chest pain at this time.  Some shortness of breath.    Past Medical History:   Diagnosis Date   • Bradycardia    • Hypertension    • Syncope      Past Surgical History:   Procedure Laterality Date   • PACEMAKER INSERTION Left 2019    Medtronic Advisa SR MRI [id number] implanted in [place].     History reviewed. No pertinent family history.  Social History     Socioeconomic History   • Marital status:      Spouse name: Not on file   • Number of children: Not on file   • Years of education: Not on file   • Highest education level: Not on file   Occupational History   • Not on file   Tobacco Use   • Smoking status: Former Smoker     Packs/day: 0.00     Types: Cigarettes     Quit date: 2018     Years since quittin.5   • Smokeless tobacco: Never Used   Substance and Sexual Activity   • Alcohol use: Yes     Comment: rarely   • Drug use: Never   • Sexual activity: Not on file   Other Topics Concern   • Not on file   Social History Narrative   • Not on file     Social Determinants of Health     Financial Resource Strain: Not on file   Food Insecurity: Not on file   Transportation Needs: Not on file   Physical Activity: Not on file   Stress: Not on file   Social Connections: Not on file   Intimate Partner Violence: Not on file   Housing Stability: Not on  file     No Known Allergies  Outpatient Encounter Medications as of 8/2/2022   Medication Sig Dispense Refill   • aspirin EC (ECOTRIN) 81 MG Tablet Delayed Response Take 81 mg by mouth every day.     • atorvastatin (LIPITOR) 80 MG tablet Take 80 mg by mouth every evening.     • clopidogrel (PLAVIX) 75 MG Tab Take 75 mg by mouth every day.     • lisinopril (PRINIVIL) 5 MG Tab Take 5 mg by mouth every day.     • predniSONE (DELTASONE) 20 MG Tab Take 20 mg by mouth every day.     • spironolactone (ALDACTONE) 25 MG Tab Take 25 mg by mouth every day.     • gabapentin (NEURONTIN) 300 MG Cap Take 300 mg by mouth 2 times a day.     • Insulin Degludec 100 UNIT/ML Solution Inject  under the skin.     • furosemide (LASIX) 40 MG Tab Take 40 mg by mouth every day.     • carvedilol (COREG) 6.25 MG Tab Take 1 Tablet by mouth 2 times a day with meals. 180 Tablet 3   • dapagliflozin propanediol (FARXIGA) 10 MG Tab Take 1 Tablet by mouth every day. 90 Tablet 3   • NOVOLOG FLEXPEN 100 UNIT/ML solution for injection Inject  under the skin 3 times a day before meals.     • FREESTYLE LITE strip      • apixaban (ELIQUIS) 5mg Tab Take 1 tablet by mouth 2 times a day. 180 tablet 1   • metFORMIN (GLUCOPHAGE) 500 MG Tab Take 500 mg by mouth every day.     • [DISCONTINUED] carvedilol (COREG) 3.125 MG Tab Take 3.125 mg by mouth 2 times a day with meals.     • [DISCONTINUED] Dapagliflozin Propanediol (FARXIGA PO) Take  by mouth.     • [DISCONTINUED] carvedilol (COREG) 6.25 MG Tab Take 1 Tablet by mouth 2 times a day with meals. 60 Tablet 11   • tamsulosin (FLOMAX) 0.4 MG capsule Take 0.4 mg by mouth every day.     • [DISCONTINUED] amLODIPine (NORVASC) 10 MG Tab Take 10 mg by mouth every day. (Patient not taking: No sig reported)     • [DISCONTINUED] atorvastatin (LIPITOR) 40 MG Tab Take 1 Tab by mouth every bedtime. (Patient not taking: Reported on 8/2/2022) 90 Tab 3   • [DISCONTINUED] losartan (COZAAR) 100 MG Tab Take 100 mg by mouth every day.  (Patient not taking: No sig reported)       Facility-Administered Encounter Medications as of 8/2/2022   Medication Dose Route Frequency Provider Last Rate Last Admin   • acetaminophen (TYLENOL) tablet 750 mg  750 mg Oral Q6HRS PRN Sergio Garner M.D.         Review of Systems   Constitutional: Negative for chills, fever, malaise/fatigue and weight loss.   HENT: Negative for ear discharge, ear pain, hearing loss and nosebleeds.    Eyes: Negative for blurred vision, double vision, pain and discharge.   Respiratory: Negative for cough and shortness of breath.    Cardiovascular: Negative for chest pain, palpitations, orthopnea, claudication, leg swelling and PND.   Gastrointestinal: Negative for abdominal pain, blood in stool, melena, nausea and vomiting.   Genitourinary: Negative for dysuria and hematuria.   Musculoskeletal: Negative for falls, joint pain and myalgias.   Skin: Negative for itching and rash.   Neurological: Negative for dizziness, sensory change, speech change, loss of consciousness and headaches.   Endo/Heme/Allergies: Negative for environmental allergies. Does not bruise/bleed easily.   Psychiatric/Behavioral: Negative for depression, hallucinations and suicidal ideas.              Objective     /84 (BP Location: Left arm, Patient Position: Sitting, BP Cuff Size: Adult)   Pulse 74   Resp 18   Ht 1.829 m (6')   Wt 87 kg (191 lb 12.8 oz)   SpO2 96%   BMI 26.01 kg/m²     Physical Exam  Vitals and nursing note reviewed.   Constitutional:       General: He is not in acute distress.     Appearance: He is not diaphoretic.   HENT:      Head: Normocephalic and atraumatic.      Right Ear: External ear normal.      Left Ear: External ear normal.      Nose: No congestion or rhinorrhea.   Eyes:      General:         Right eye: No discharge.         Left eye: No discharge.   Neck:      Thyroid: No thyromegaly.      Vascular: No JVD.   Cardiovascular:      Rate and Rhythm: Normal rate and regular  rhythm.      Pulses: Normal pulses.   Pulmonary:      Effort: No respiratory distress.   Abdominal:      General: There is no distension.      Tenderness: There is no abdominal tenderness.   Musculoskeletal:         General: No swelling or tenderness.      Right lower leg: No edema.      Left lower leg: No edema.   Skin:     General: Skin is warm and dry.   Neurological:      Mental Status: He is alert and oriented to person, place, and time.      Cranial Nerves: No cranial nerve deficit.   Psychiatric:         Behavior: Behavior normal.                Assessment & Plan     1. Stented coronary artery  EC-ECHOCARDIOGRAM COMPLETE W/O CONT   2. Coronary artery disease involving native coronary artery of native heart without angina pectoris  EC-ECHOCARDIOGRAM COMPLETE W/O CONT   3. HTN (hypertension), malignant  EC-ECHOCARDIOGRAM COMPLETE W/O CONT   4. Paroxysmal atrial fibrillation (HCC)  EKG    EC-ECHOCARDIOGRAM COMPLETE W/O CONT   5. Mixed hyperlipidemia  EC-ECHOCARDIOGRAM COMPLETE W/O CONT   6. Cardiac pacemaker in situ     7. Type 2 diabetes mellitus without complication, unspecified whether long term insulin use (HCC)         Medical Decision Making: Today's Assessment/Status/Plan:   Today, based on physical examination findings, patient is euvolemic. No JVD, lungs are clear to auscultation, no pitting edema in bilateral lower extremities, no ascites.    Dry weight is 191 lbs.    Will increase Carvedilol to 6.25 mg po twice daily.    I will change records from [place] for review.  This time, we will not do major medical changes until further information known.    Continue anticoagulation with Eliquis 5 mg p.o. twice a day for paroxysmal atrial fibrillation stroke risk reduction.  Continue spinal lactone 25 mg daily, Atorvastatin 80 mg daily.    Continue Farxiga 10 mg p.o. once a day    Continue dialysis of furosemide 40 mg p.o. once a day.    Continue dual antiplatelet therapy with aspirin and  clopidogrel

## 2022-08-03 LAB — EKG IMPRESSION: NORMAL

## 2022-08-03 ASSESSMENT — ENCOUNTER SYMPTOMS
VOMITING: 0
HALLUCINATIONS: 0
EYE DISCHARGE: 0
FALLS: 0
EYE PAIN: 0
SHORTNESS OF BREATH: 0
WEIGHT LOSS: 0
BLURRED VISION: 0
DEPRESSION: 0
HEADACHES: 0
ABDOMINAL PAIN: 0
SENSORY CHANGE: 0
CHILLS: 0
CLAUDICATION: 0
BRUISES/BLEEDS EASILY: 0
ORTHOPNEA: 0
PND: 0
SPEECH CHANGE: 0
BLOOD IN STOOL: 0
LOSS OF CONSCIOUSNESS: 0
DOUBLE VISION: 0
MYALGIAS: 0
COUGH: 0
DIZZINESS: 0
PALPITATIONS: 0
NAUSEA: 0
FEVER: 0

## 2022-08-04 ENCOUNTER — TELEPHONE (OUTPATIENT)
Dept: CARDIOLOGY | Facility: MEDICAL CENTER | Age: 82
End: 2022-08-04
Payer: MEDICARE

## 2022-08-11 ENCOUNTER — NON-PROVIDER VISIT (OUTPATIENT)
Dept: CARDIOLOGY | Facility: PHYSICIAN GROUP | Age: 82
End: 2022-08-11
Payer: MEDICARE

## 2022-08-11 VITALS
HEART RATE: 68 BPM | WEIGHT: 194 LBS | RESPIRATION RATE: 16 BRPM | HEIGHT: 72 IN | SYSTOLIC BLOOD PRESSURE: 80 MMHG | OXYGEN SATURATION: 98 % | DIASTOLIC BLOOD PRESSURE: 50 MMHG | BODY MASS INDEX: 26.28 KG/M2

## 2022-08-11 DIAGNOSIS — R00.1 BRADYCARDIA: ICD-10-CM

## 2022-08-11 DIAGNOSIS — Z95.0 CARDIAC PACEMAKER IN SITU: ICD-10-CM

## 2022-08-11 DIAGNOSIS — I48.0 PAROXYSMAL ATRIAL FIBRILLATION (HCC): ICD-10-CM

## 2022-08-11 PROBLEM — E78.2 MIXED HYPERLIPIDEMIA: Status: ACTIVE | Noted: 2022-08-11

## 2022-08-11 PROCEDURE — 93280 PM DEVICE PROGR EVAL DUAL: CPT | Performed by: NURSE PRACTITIONER

## 2022-08-11 NOTE — PROGRESS NOTES
Device is working normally. Underlying rhythm is atrial fibrillation; he is anticoagulated with Eliquis.  Normal sensing and capture of RV lead; stable impedance. Battery longevity is 7.5 years.  No changes are made today.    Patient's wife states that his blood glucose was 400+ this morning, and he seems quite lethargic today. Urged to go to ER, to have this checked and possibly treated.    Follow-up in 6 months for next PM check with me.

## 2022-08-24 ENCOUNTER — OFFICE VISIT (OUTPATIENT)
Dept: CARDIOLOGY | Facility: MEDICAL CENTER | Age: 82
End: 2022-08-24
Payer: MEDICARE

## 2022-08-24 VITALS
SYSTOLIC BLOOD PRESSURE: 102 MMHG | HEART RATE: 81 BPM | HEIGHT: 72 IN | BODY MASS INDEX: 25.87 KG/M2 | DIASTOLIC BLOOD PRESSURE: 70 MMHG | OXYGEN SATURATION: 95 % | WEIGHT: 191 LBS | RESPIRATION RATE: 16 BRPM

## 2022-08-24 DIAGNOSIS — Z95.5 STENTED CORONARY ARTERY: ICD-10-CM

## 2022-08-24 DIAGNOSIS — I25.10 CORONARY ARTERY DISEASE INVOLVING NATIVE CORONARY ARTERY OF NATIVE HEART WITHOUT ANGINA PECTORIS: ICD-10-CM

## 2022-08-24 DIAGNOSIS — I48.11 LONGSTANDING PERSISTENT ATRIAL FIBRILLATION (HCC): ICD-10-CM

## 2022-08-24 DIAGNOSIS — I50.20 ACC/AHA STAGE C SYSTOLIC HEART FAILURE (HCC): ICD-10-CM

## 2022-08-24 DIAGNOSIS — Z95.0 CARDIAC PACEMAKER IN SITU: ICD-10-CM

## 2022-08-24 DIAGNOSIS — I10 HTN (HYPERTENSION), MALIGNANT: ICD-10-CM

## 2022-08-24 DIAGNOSIS — I51.89 LEFT VENTRICULAR SYSTOLIC DYSFUNCTION, NYHA CLASS 3: ICD-10-CM

## 2022-08-24 DIAGNOSIS — Z79.01 CHRONIC ANTICOAGULATION: ICD-10-CM

## 2022-08-24 DIAGNOSIS — E11.9 TYPE 2 DIABETES MELLITUS WITHOUT COMPLICATION, UNSPECIFIED WHETHER LONG TERM INSULIN USE (HCC): ICD-10-CM

## 2022-08-24 DIAGNOSIS — E78.2 MIXED HYPERLIPIDEMIA: ICD-10-CM

## 2022-08-24 PROCEDURE — 93000 ELECTROCARDIOGRAM COMPLETE: CPT | Performed by: INTERNAL MEDICINE

## 2022-08-24 PROCEDURE — 99215 OFFICE O/P EST HI 40 MIN: CPT | Performed by: INTERNAL MEDICINE

## 2022-08-24 RX ORDER — SACUBITRIL AND VALSARTAN 24; 26 MG/1; MG/1
1 TABLET, FILM COATED ORAL 2 TIMES DAILY
Qty: 60 TABLET | Refills: 3 | Status: SHIPPED | OUTPATIENT
Start: 2022-08-24 | End: 2022-09-14 | Stop reason: SDUPTHER

## 2022-08-24 RX ORDER — FUROSEMIDE 20 MG/1
20 TABLET ORAL 2 TIMES DAILY
Qty: 30 TABLET | Refills: 3 | Status: SHIPPED | OUTPATIENT
Start: 2022-08-24 | End: 2022-09-14

## 2022-08-24 RX ORDER — CARVEDILOL 6.25 MG/1
6.25 TABLET ORAL 2 TIMES DAILY WITH MEALS
Qty: 60 TABLET | Refills: 11 | Status: SHIPPED | OUTPATIENT
Start: 2022-08-24 | End: 2022-09-14 | Stop reason: SDUPTHER

## 2022-08-24 ASSESSMENT — ENCOUNTER SYMPTOMS
BLURRED VISION: 0
CHILLS: 0
FALLS: 0
SENSORY CHANGE: 0
SHORTNESS OF BREATH: 0
ABDOMINAL PAIN: 0
EYE PAIN: 0
SPEECH CHANGE: 0
BLOOD IN STOOL: 0
BRUISES/BLEEDS EASILY: 0
WEIGHT LOSS: 0
PND: 0
LOSS OF CONSCIOUSNESS: 0
COUGH: 0
PALPITATIONS: 0
HALLUCINATIONS: 0
ORTHOPNEA: 0
HEADACHES: 0
VOMITING: 0
DIZZINESS: 0
NAUSEA: 0
EYE DISCHARGE: 0
CLAUDICATION: 0
FEVER: 0
DEPRESSION: 0
DOUBLE VISION: 0
MYALGIAS: 0

## 2022-08-24 NOTE — PROGRESS NOTES
Chief Complaint   Patient presents with    Hypertension    Atrial Fibrillation    Hyperlipidemia       Subjective     Nolberto Severino is an 82 y.o. male who presents today for cardiac care and evaluation in order cardiology clinic after recent hospitalization at [facility].  Per patient's report, he underwent coronary stenting with unknown distribution.  He had PCI of large Ramus, residual disease in LAD. Also found to have LVEF of 25%.    He denies having chest pain at this time.      Patient still gets winded with daily living activities and exertion. No symptoms at rest.      Past Medical History:   Diagnosis Date    Bradycardia     Chronic anticoagulation     Hypertension     Paroxysmal atrial fibrillation (HCC) 2022    Echocardiogram with normal LV size, LVEF 60-65%. Normal RA, LA and RV. Mild AR, mild MR, moderate TR. RVSP 28mmHg.    Syncope      Past Surgical History:   Procedure Laterality Date    PACEMAKER INSERTION Left 2019    Medtronic Advisa SR MRI [id number] implanted in [place].     History reviewed. No pertinent family history.  Social History     Socioeconomic History    Marital status:      Spouse name: Not on file    Number of children: Not on file    Years of education: Not on file    Highest education level: Not on file   Occupational History    Not on file   Tobacco Use    Smoking status: Former     Packs/day: 0.00     Types: Cigarettes     Quit date: 2018     Years since quittin.6    Smokeless tobacco: Never   Substance and Sexual Activity    Alcohol use: Yes     Comment: rarely    Drug use: Never    Sexual activity: Not on file   Other Topics Concern    Not on file   Social History Narrative    Not on file     Social Determinants of Health     Financial Resource Strain: Not on file   Food Insecurity: Not on file   Transportation Needs: Not on file   Physical Activity: Not on file   Stress: Not on file   Social Connections: Not on file   Intimate Partner Violence: Not on file    Housing Stability: Not on file     No Known Allergies  Outpatient Encounter Medications as of 8/24/2022   Medication Sig Dispense Refill    atorvastatin (LIPITOR) 80 MG tablet Take 80 mg by mouth every evening.      clopidogrel (PLAVIX) 75 MG Tab Take 75 mg by mouth every day.      lisinopril (PRINIVIL) 5 MG Tab Take 5 mg by mouth every day.      spironolactone (ALDACTONE) 25 MG Tab Take 25 mg by mouth every day.      gabapentin (NEURONTIN) 300 MG Cap Take 300 mg by mouth 2 times a day.      Insulin Degludec 100 UNIT/ML Solution Inject  under the skin.      furosemide (LASIX) 40 MG Tab Take 40 mg by mouth every day.      carvedilol (COREG) 6.25 MG Tab Take 1 Tablet by mouth 2 times a day with meals. (Patient taking differently: Take 6.25 mg by mouth 2 times a day with meals. 3.125mg) 180 Tablet 3    dapagliflozin propanediol (FARXIGA) 10 MG Tab Take 1 Tablet by mouth every day. 90 Tablet 3    NOVOLOG FLEXPEN 100 UNIT/ML solution for injection Inject  under the skin 3 times a day before meals.      tamsulosin (FLOMAX) 0.4 MG capsule Take 0.4 mg by mouth every day.      FREESTYLE LITE strip       apixaban (ELIQUIS) 5mg Tab Take 1 tablet by mouth 2 times a day. 180 tablet 1    metFORMIN (GLUCOPHAGE) 500 MG Tab Take 500 mg by mouth every day.      predniSONE (DELTASONE) 20 MG Tab Take 20 mg by mouth every day. (Patient not taking: No sig reported)       Facility-Administered Encounter Medications as of 8/24/2022   Medication Dose Route Frequency Provider Last Rate Last Admin    acetaminophen (TYLENOL) tablet 750 mg  750 mg Oral Q6HRS PRN Sergio Garner M.D.         Review of Systems   Constitutional:  Negative for chills, fever, malaise/fatigue and weight loss.   HENT:  Negative for ear discharge, ear pain, hearing loss and nosebleeds.    Eyes:  Negative for blurred vision, double vision, pain and discharge.   Respiratory:  Negative for cough and shortness of breath.    Cardiovascular:  Negative for chest pain,  palpitations, orthopnea, claudication, leg swelling and PND.   Gastrointestinal:  Negative for abdominal pain, blood in stool, melena, nausea and vomiting.   Genitourinary:  Negative for dysuria and hematuria.   Musculoskeletal:  Negative for falls, joint pain and myalgias.   Skin:  Negative for itching and rash.   Neurological:  Negative for dizziness, sensory change, speech change, loss of consciousness and headaches.   Endo/Heme/Allergies:  Negative for environmental allergies. Does not bruise/bleed easily.   Psychiatric/Behavioral:  Negative for depression, hallucinations and suicidal ideas.             Objective     /70 (BP Location: Left arm, Patient Position: Sitting, BP Cuff Size: Adult)   Pulse 81   Resp 16   Ht 1.829 m (6')   Wt 86.6 kg (191 lb)   SpO2 95%   BMI 25.90 kg/m²     Physical Exam  Vitals and nursing note reviewed.   Constitutional:       General: He is not in acute distress.     Appearance: He is not diaphoretic.   HENT:      Head: Normocephalic and atraumatic.      Right Ear: External ear normal.      Left Ear: External ear normal.      Nose: No congestion or rhinorrhea.   Eyes:      General:         Right eye: No discharge.         Left eye: No discharge.   Neck:      Thyroid: No thyromegaly.      Vascular: No JVD.   Cardiovascular:      Rate and Rhythm: Normal rate and regular rhythm.      Pulses: Normal pulses.   Pulmonary:      Effort: No respiratory distress.   Abdominal:      General: There is no distension.      Tenderness: There is no abdominal tenderness.   Musculoskeletal:         General: No swelling or tenderness.      Right lower leg: No edema.      Left lower leg: No edema.   Skin:     General: Skin is warm and dry.   Neurological:      Mental Status: He is alert and oriented to person, place, and time.      Cranial Nerves: No cranial nerve deficit.   Psychiatric:         Behavior: Behavior normal.              Assessment & Plan     1. Cardiac pacemaker in situ         2. Stented coronary artery        3. Coronary artery disease involving native coronary artery of native heart without angina pectoris        4. Longstanding persistent atrial fibrillation (HCC)  EKG      5. HTN (hypertension), malignant        6. Mixed hyperlipidemia        7. Type 2 diabetes mellitus without complication, unspecified whether long term insulin use (HCC)        8. Chronic anticoagulation            Medical Decision Making: Today's Assessment/Status/Plan:   Today, based on physical examination findings, patient is euvolemic. No JVD, lungs are clear to auscultation, no pitting edema in bilateral lower extremities, no ascites.    Dry weight is 191 lbs.    Will continue Carvedilol 6.25 mg po twice daily.    Based on the overall clinical history and profile, patient is a good candidate for Entresto therapy (with superiority over ACE-I therapy in terms of survival benefits and prevention of future hospitalization).  We will stop ACE-I therapy.  Will start Entresto therapy at 24/26 mg po bid.  Patient was given written instruction about the protocol of initiating Entresto therapy with wash out period.    Will reduce Furosemide to 20 mg daily.    Based on recent data on SGLT2 and heart failure with reduced ejection fraction, patient will be benefited from Farxiga 10 mg p.o. once a day for further reduction in mortality and hospitalization with absolute risk reduction of 4.9%.  Therefore, I will continue patient on Farxiga 10 mg p.o. once a day.  Risks and benefits were explained to patient and patient has agreed to proceed.    Continue anticoagulation with Eliquis 5 mg p.o. twice a day for atrial fibrillation stroke risk reduction.  Continue spironolactone 25 mg daily, Atorvastatin 80 mg daily.    Continue Farxiga 10 mg p.o. once a day    Continue diuresis of furosemide 40 mg p.o. once a day.    Continue dual antiplatelet therapy with aspirin and clopidogrel, Atorvastatin 80 mg daily.    We will consider  ICD upgrade for primary prevention in 3 months if his left ventricular systolic function remains less than 35% after optimization of his evidence based heart failure medical regimen.    This patient requires highest level of care due to multiple comorbidities along with recent hospitalization of extreme, acute decompensation.  My office visit with patient today requires highest level of care and attempts to reduce future cardiovascular hospitalization along with mortality.  I personally interpreted all of patient's EKG findings tracings, transthoracic echocardiogram images, cardiac catheterization/coronary angiogram images, blood test results in order to formulate a comprehensive cardiovascular plan of care.  I spent a significant amount of time discussing with patient and family about the potential side effects of all of patient's medications along with education on the benefits of adding new medication and changing his current medical regimen to further reduce cardiovascular mortality.

## 2022-08-24 NOTE — PATIENT INSTRUCTIONS
Please stop Lisinopril today 08/24/2022.    Start Entresto 24/26 mg (twice a day) the morning of 08/27/2022 (3 days from now).    Will reduce Furosemide to 20 mg daily.    Carvedilol at 6.25 mg twice a day.

## 2022-08-25 LAB — EKG IMPRESSION: NORMAL

## 2022-08-29 ENCOUNTER — PATIENT MESSAGE (OUTPATIENT)
Dept: CARDIOLOGY | Facility: MEDICAL CENTER | Age: 82
End: 2022-08-29
Payer: MEDICARE

## 2022-08-29 ENCOUNTER — TELEPHONE (OUTPATIENT)
Dept: CARDIOLOGY | Facility: MEDICAL CENTER | Age: 82
End: 2022-08-29
Payer: MEDICARE

## 2022-08-29 NOTE — TELEPHONE ENCOUNTER
Reviewed TT's last OV note.    Called pt, [phone] to review concerns; unable to reach.  Left voicemail to return this call at their earliest convenience.    [employer] message sent requesting more information regarding concerns, awaiting pt response.

## 2022-08-29 NOTE — TELEPHONE ENCOUNTER
TT    Caller: - [facility]    Reported Symptoms: Low BP and dizziness, Mel would like Nolberto to be called, to discuss his script for;   sacubitril-valsartan (ENTRESTO) 24-26 MG Tab tablet (Order #[id number]) on 22, reports he is not dehydrated, and is concerned about his Low PB.     Recent Blood Pressure/Heart Rate Readin/60  98/72    Callback Number: [phone]    Thank you,   Elise DRUMMOND

## 2022-08-31 DIAGNOSIS — E11.9 TYPE 2 DIABETES MELLITUS WITHOUT COMPLICATION, UNSPECIFIED WHETHER LONG TERM INSULIN USE (HCC): ICD-10-CM

## 2022-08-31 DIAGNOSIS — Z79.01 CHRONIC ANTICOAGULATION: ICD-10-CM

## 2022-08-31 DIAGNOSIS — I25.10 CORONARY ARTERY DISEASE INVOLVING NATIVE CORONARY ARTERY OF NATIVE HEART WITHOUT ANGINA PECTORIS: ICD-10-CM

## 2022-08-31 DIAGNOSIS — I10 HTN (HYPERTENSION), MALIGNANT: ICD-10-CM

## 2022-08-31 DIAGNOSIS — E78.2 MIXED HYPERLIPIDEMIA: ICD-10-CM

## 2022-08-31 DIAGNOSIS — I48.11 LONGSTANDING PERSISTENT ATRIAL FIBRILLATION (HCC): ICD-10-CM

## 2022-08-31 DIAGNOSIS — Z95.0 CARDIAC PACEMAKER IN SITU: ICD-10-CM

## 2022-08-31 DIAGNOSIS — Z95.5 STENTED CORONARY ARTERY: ICD-10-CM

## 2022-09-01 NOTE — PATIENT COMMUNICATION
"To TT, pt stopped taking Entresto since Monday as his BP was in the 80's systolic.  Pt now BP is 110/70.  Please advise, thank you    =========================        2nd attempt to call pt, [phone], and s/w Elidia regarding concerns.  She states Sunday was the \"2nd day starting Entresto and BP went to 80/66, called ambulance.  He was cold and clammy and couldn't get up and paramedics said to drink more water to get blood volume up.\"  He was not admitted at the hospital at that time.   RN  Mel visited on Monday and reviewed medications.  Elidia states pt d/c lisinopril and started Entresto on Saturday.  She states pt drinks 6 glasses a day, urinating every hour; light yellow.  Elidia states pt stopped Entresto and pt BP was > 100 systolic yesterday.  Today /70.    Advised pt to increase water intake to 8 glasses of 8 ounce or 64 ounces, daily and if BP is still under >90 systolic, he may have a small salty snack to help increase BP but to make sure he does not go over 2,000mg sodium.  Reassurance given that findings will be relayed to MD for advise.  Pt verbalizes understanding and states no other concerns or questions at this time.  She is appreciative of information given.    "

## 2022-09-07 NOTE — PATIENT COMMUNICATION
**2nd attempt** To TT, pt stopped taking Entresto since Monday as his BP was in the 80's systolic.  /70 day after stopping Entresto 8/30.  Please advise, thank you

## 2022-09-08 NOTE — PATIENT COMMUNICATION
Blood Pressure Concerns  Yvan Rush M.D.  You 4 hours ago (10:38 AM)     Needs to stop Furosemide as well.     Yvan Rush M.D.      Returned pt call, [phone], to review MD recommendations, unable to reach.  Left voicemail to return this call at their earliest convenience.

## 2022-09-14 ENCOUNTER — OFFICE VISIT (OUTPATIENT)
Dept: CARDIOLOGY | Facility: MEDICAL CENTER | Age: 82
End: 2022-09-14
Payer: MEDICARE

## 2022-09-14 VITALS
HEART RATE: 78 BPM | RESPIRATION RATE: 14 BRPM | SYSTOLIC BLOOD PRESSURE: 130 MMHG | OXYGEN SATURATION: 96 % | BODY MASS INDEX: 26.95 KG/M2 | HEIGHT: 72 IN | DIASTOLIC BLOOD PRESSURE: 84 MMHG | WEIGHT: 199 LBS

## 2022-09-14 DIAGNOSIS — Z95.0 CARDIAC PACEMAKER IN SITU: ICD-10-CM

## 2022-09-14 DIAGNOSIS — E78.2 MIXED HYPERLIPIDEMIA: ICD-10-CM

## 2022-09-14 DIAGNOSIS — I25.10 CORONARY ARTERY DISEASE INVOLVING NATIVE CORONARY ARTERY OF NATIVE HEART WITHOUT ANGINA PECTORIS: ICD-10-CM

## 2022-09-14 DIAGNOSIS — Z95.5 STENTED CORONARY ARTERY: ICD-10-CM

## 2022-09-14 DIAGNOSIS — I51.89 LEFT VENTRICULAR SYSTOLIC DYSFUNCTION, NYHA CLASS 3: ICD-10-CM

## 2022-09-14 DIAGNOSIS — I50.20 ACC/AHA STAGE C SYSTOLIC HEART FAILURE (HCC): ICD-10-CM

## 2022-09-14 DIAGNOSIS — I10 HTN (HYPERTENSION), MALIGNANT: ICD-10-CM

## 2022-09-14 DIAGNOSIS — Z79.01 CHRONIC ANTICOAGULATION: ICD-10-CM

## 2022-09-14 DIAGNOSIS — I48.11 LONGSTANDING PERSISTENT ATRIAL FIBRILLATION (HCC): ICD-10-CM

## 2022-09-14 DIAGNOSIS — I48.0 PAROXYSMAL ATRIAL FIBRILLATION (HCC): ICD-10-CM

## 2022-09-14 DIAGNOSIS — Z79.899 HIGH RISK MEDICATION USE: ICD-10-CM

## 2022-09-14 PROCEDURE — 93000 ELECTROCARDIOGRAM COMPLETE: CPT | Performed by: INTERNAL MEDICINE

## 2022-09-14 PROCEDURE — 99215 OFFICE O/P EST HI 40 MIN: CPT | Performed by: INTERNAL MEDICINE

## 2022-09-14 RX ORDER — CARVEDILOL 6.25 MG/1
6.25 TABLET ORAL 2 TIMES DAILY WITH MEALS
Qty: 60 TABLET | Refills: 11 | Status: ON HOLD | OUTPATIENT
Start: 2022-09-14 | End: 2023-01-09

## 2022-09-14 RX ORDER — FUROSEMIDE 20 MG/1
20 TABLET ORAL DAILY
Qty: 30 TABLET | Refills: 3 | Status: ON HOLD | OUTPATIENT
Start: 2022-09-14 | End: 2023-01-09

## 2022-09-14 RX ORDER — SACUBITRIL AND VALSARTAN 24; 26 MG/1; MG/1
1 TABLET, FILM COATED ORAL 2 TIMES DAILY
Qty: 60 TABLET | Refills: 3 | Status: SHIPPED | OUTPATIENT
Start: 2022-09-14 | End: 2022-11-22 | Stop reason: SDUPTHER

## 2022-09-14 RX ORDER — CARVEDILOL 3.12 MG/1
3.12 TABLET ORAL 2 TIMES DAILY WITH MEALS
COMMUNITY
End: 2022-09-14

## 2022-09-14 RX ORDER — DULAGLUTIDE 1.5 MG/.5ML
0.5 INJECTION, SOLUTION SUBCUTANEOUS
COMMUNITY
Start: 2022-09-02

## 2022-09-14 ASSESSMENT — ENCOUNTER SYMPTOMS
BRUISES/BLEEDS EASILY: 0
SHORTNESS OF BREATH: 0
COUGH: 0
MYALGIAS: 0
DOUBLE VISION: 0
SENSORY CHANGE: 0
BLOOD IN STOOL: 0
FEVER: 0
PALPITATIONS: 0
FALLS: 0
EYE DISCHARGE: 0
CHILLS: 0
CLAUDICATION: 0
NAUSEA: 0
ORTHOPNEA: 0
PND: 0
WEIGHT LOSS: 0
LOSS OF CONSCIOUSNESS: 0
HALLUCINATIONS: 0
ABDOMINAL PAIN: 0
DIZZINESS: 0
EYE PAIN: 0
BLURRED VISION: 0
HEADACHES: 0
VOMITING: 0
DEPRESSION: 0
SPEECH CHANGE: 0

## 2022-09-14 NOTE — PROGRESS NOTES
Chief Complaint   Patient presents with    Atrial Fibrillation     Follow up         Subjective     Nolberto Severino is an 82 y.o. male who presents today for cardiac care and evaluation in order cardiology clinic after recent hospitalization at [facility].  Per patient's report, he underwent coronary stenting with unknown distribution.  He had PCI of large Ramus, residual disease in LAD. Also found to have LVEF of 25%.    He denies having chest pain at this time.      Patient still gets winded with daily living activities and exertion. No symptoms at rest.    I have independently interpreted and reviewed blood tests results with patient in clinic which shows GFR of 44, K of 4.2. NT pro BNP of 4598.    Entresto and Carvedilol were held du to low blood pressures.    Past Medical History:   Diagnosis Date    Bradycardia     Chronic anticoagulation     Hypertension     Paroxysmal atrial fibrillation (HCC) 2022    Echocardiogram with normal LV size, LVEF 60-65%. Normal RA, LA and RV. Mild AR, mild MR, moderate TR. RVSP 28mmHg.    Syncope      Past Surgical History:   Procedure Laterality Date    PACEMAKER INSERTION Left 2019    Medtronic Advisa SR MRI [id number] implanted in [place].     History reviewed. No pertinent family history.  Social History     Socioeconomic History    Marital status:      Spouse name: Not on file    Number of children: Not on file    Years of education: Not on file    Highest education level: Not on file   Occupational History    Not on file   Tobacco Use    Smoking status: Former     Packs/day: 0.00     Types: Cigarettes     Quit date: 2018     Years since quittin.7    Smokeless tobacco: Never   Substance and Sexual Activity    Alcohol use: Yes     Comment: rarely    Drug use: Never    Sexual activity: Not on file   Other Topics Concern    Not on file   Social History Narrative    Not on file     Social Determinants of Health     Financial Resource Strain: Not on file   Food  Insecurity: Not on file   Transportation Needs: Not on file   Physical Activity: Not on file   Stress: Not on file   Social Connections: Not on file   Intimate Partner Violence: Not on file   Housing Stability: Not on file     No Known Allergies  Outpatient Encounter Medications as of 9/14/2022   Medication Sig Dispense Refill    TRULICITY 1.5 MG/0.5ML Solution Pen-injector       sacubitril-valsartan (ENTRESTO) 24-26 MG Tab Take 1 Tablet by mouth 2 times a day. 60 Tablet 3    carvedilol (COREG) 6.25 MG Tab Take 1 Tablet by mouth 2 times a day with meals. 60 Tablet 11    furosemide (LASIX) 20 MG Tab Take 1 Tablet by mouth every day. 30 Tablet 3    atorvastatin (LIPITOR) 80 MG tablet Take 80 mg by mouth every evening.      clopidogrel (PLAVIX) 75 MG Tab Take 75 mg by mouth every day.      spironolactone (ALDACTONE) 25 MG Tab Take 25 mg by mouth every day.      gabapentin (NEURONTIN) 300 MG Cap Take 300 mg by mouth 2 times a day.      Insulin Degludec 100 UNIT/ML Solution Inject  under the skin.      dapagliflozin propanediol (FARXIGA) 10 MG Tab Take 1 Tablet by mouth every day. 90 Tablet 3    NOVOLOG FLEXPEN 100 UNIT/ML solution for injection Inject  under the skin 3 times a day before meals.      apixaban (ELIQUIS) 5mg Tab Take 1 tablet by mouth 2 times a day. (Patient taking differently: Take 5 mg by mouth every day.) 180 tablet 1    metFORMIN (GLUCOPHAGE) 500 MG Tab Take 500 mg by mouth every day.      [DISCONTINUED] carvedilol (COREG) 3.125 MG Tab Take 3.125 mg by mouth 2 times a day with meals.      [DISCONTINUED] sacubitril-valsartan (ENTRESTO) 24-26 MG Tab tablet Take 1 Tablet by mouth 2 times a day. (Patient not taking: No sig reported) 60 Tablet 3    [DISCONTINUED] furosemide (LASIX) 20 MG Tab Take 1 Tablet by mouth 2 times a day. 30 Tablet 3    [DISCONTINUED] carvedilol (COREG) 6.25 MG Tab Take 1 Tablet by mouth 2 times a day with meals. (Patient not taking: Reported on 9/14/2022) 60 Tablet 11    FREESTYLE  LITE strip       [DISCONTINUED] tamsulosin (FLOMAX) 0.4 MG capsule Take 0.4 mg by mouth every day. (Patient not taking: Reported on 9/14/2022)       Facility-Administered Encounter Medications as of 9/14/2022   Medication Dose Route Frequency Provider Last Rate Last Admin    acetaminophen (TYLENOL) tablet 750 mg  750 mg Oral Q6HRS PRN Sergio Garner M.D.         Review of Systems   Constitutional:  Negative for chills, fever, malaise/fatigue and weight loss.   HENT:  Negative for ear discharge, ear pain, hearing loss and nosebleeds.    Eyes:  Negative for blurred vision, double vision, pain and discharge.   Respiratory:  Negative for cough and shortness of breath.    Cardiovascular:  Negative for chest pain, palpitations, orthopnea, claudication, leg swelling and PND.   Gastrointestinal:  Negative for abdominal pain, blood in stool, melena, nausea and vomiting.   Genitourinary:  Negative for dysuria and hematuria.   Musculoskeletal:  Negative for falls, joint pain and myalgias.   Skin:  Negative for itching and rash.   Neurological:  Negative for dizziness, sensory change, speech change, loss of consciousness and headaches.   Endo/Heme/Allergies:  Negative for environmental allergies. Does not bruise/bleed easily.   Psychiatric/Behavioral:  Negative for depression, hallucinations and suicidal ideas.             Objective     /84 (BP Location: Left arm, Patient Position: Sitting)   Pulse 78   Resp 14   Ht 1.829 m (6')   Wt 90.3 kg (199 lb)   SpO2 96%   BMI 26.99 kg/m²     Physical Exam  Vitals and nursing note reviewed.   Constitutional:       General: He is not in acute distress.     Appearance: He is not diaphoretic.   HENT:      Head: Normocephalic and atraumatic.      Right Ear: External ear normal.      Left Ear: External ear normal.      Nose: No congestion or rhinorrhea.   Eyes:      General:         Right eye: No discharge.         Left eye: No discharge.   Neck:      Thyroid: No thyromegaly.       Vascular: No JVD.   Cardiovascular:      Rate and Rhythm: Normal rate and regular rhythm.      Pulses: Normal pulses.   Pulmonary:      Effort: No respiratory distress.   Abdominal:      General: There is no distension.      Tenderness: There is no abdominal tenderness.   Musculoskeletal:         General: No swelling or tenderness.      Right lower leg: No edema.      Left lower leg: No edema.   Skin:     General: Skin is warm and dry.   Neurological:      Mental Status: He is alert and oriented to person, place, and time.      Cranial Nerves: No cranial nerve deficit.   Psychiatric:         Behavior: Behavior normal.              Assessment & Plan     1. ACC/AHA stage C systolic heart failure (HCC)  furosemide (LASIX) 20 MG Tab    Basic Metabolic Panel      2. Left ventricular systolic dysfunction, NYHA class 3  furosemide (LASIX) 20 MG Tab    Basic Metabolic Panel      3. HTN (hypertension), malignant        4. Paroxysmal atrial fibrillation (HCC)  EKG      5. Coronary artery disease involving native coronary artery of native heart without angina pectoris        6. Stented coronary artery        7. Longstanding persistent atrial fibrillation (HCC)        8. Cardiac pacemaker in situ        9. Mixed hyperlipidemia        10. Chronic anticoagulation        11. High risk medication use            Medical Decision Making: Today's Assessment/Status/Plan:   Today, based on physical examination findings, patient is euvolemic. No JVD, lungs are clear to auscultation, no pitting edema in bilateral lower extremities, no ascites.    Dry weight is 199 lbs. Gained 8 lbs.    Based on the overall clinical history and profile, patient is a good candidate for Entresto therapy (with superiority over ACE-I therapy in terms of survival benefits and prevention of future hospitalization).  Will restart Entresto therapy at 24/26 mg po bid.    Will reduce Furosemide to 20 mg daily.    Restart Entresto 24/26 mg twice a day and Carvedilol  6.25 mg twice a day. Blood pressure is better now. Thus, less risk of hypotension.    Based on recent data on SGLT2 and heart failure with reduced ejection fraction, patient will be benefited from Farxiga 10 mg p.o. once a day for further reduction in mortality and hospitalization with absolute risk reduction of 4.9%.  Therefore, I will continue patient on Farxiga 10 mg p.o. once a day.  Risks and benefits were explained to patient and patient has agreed to proceed.    Continue anticoagulation with Eliquis 5 mg p.o. twice a day for atrial fibrillation stroke risk reduction.  Continue spironolactone 25 mg daily, Atorvastatin 80 mg daily.    Continue Farxiga 10 mg p.o. once a day    Continue diuresis of furosemide 40 mg p.o. once a day.    Continue dual antiplatelet therapy with clopidogrel, Atorvastatin 80 mg daily.    Continue anticoagulation with Eliquis 5 mg bid for treatment of stroke risk reduction. Will closely monitor side effect of systemic bleeding.    We will consider ICD upgrade for primary prevention in 3 months if his left ventricular systolic function remains less than 35% after optimization of his evidence based heart failure medical regimen.    This patient requires highest level of care due to multiple comorbidities along with recent hospitalization of extreme, acute decompensation.  My office visit with patient today requires highest level of care and attempts to reduce future cardiovascular hospitalization along with mortality.  I personally interpreted all of patient's EKG findings tracings, transthoracic echocardiogram images, cardiac catheterization/coronary angiogram images, blood test results in order to formulate a comprehensive cardiovascular plan of care.  I spent a significant amount of time discussing with patient and family about the potential side effects of all of patient's medications along with education on the benefits of adding new medication and changing his current medical  regimen to further reduce cardiovascular mortality.

## 2022-09-15 LAB — EKG IMPRESSION: NORMAL

## 2022-10-07 DIAGNOSIS — I50.20 ACC/AHA STAGE C SYSTOLIC HEART FAILURE (HCC): ICD-10-CM

## 2022-10-07 DIAGNOSIS — I51.89 LEFT VENTRICULAR SYSTOLIC DYSFUNCTION, NYHA CLASS 3: ICD-10-CM

## 2022-10-14 ENCOUNTER — TELEPHONE (OUTPATIENT)
Dept: CARDIOLOGY | Facility: MEDICAL CENTER | Age: 82
End: 2022-10-14

## 2022-10-14 NOTE — TELEPHONE ENCOUNTER
TT    Caller: Elidia (spouse)    Topic/issue: would like to have the 1:15 appt, however will need a call to confirm help with his MyChart.     Callback Number: [phone]    Thank you,   Elise DRUMMOND

## 2022-10-26 ENCOUNTER — TELEPHONE (OUTPATIENT)
Dept: CARDIOLOGY | Facility: MEDICAL CENTER | Age: 82
End: 2022-10-26
Payer: MEDICARE

## 2022-10-26 NOTE — TELEPHONE ENCOUNTER
TT    Caller: Mel    Name and Department:     [facility]    Topic/Issue: INDIGESTION    Mel w/RN with [facility] states that patient is complaining of increased indigestion. He states that this is the same feeling prior to his heart attack. Patient is radha any other symptoms associated to a heart attack. Mel would like for TT or RN to reach to patient directly. Please advise.    Thank you,  Kurtis DOMINIQUE    Callback Number or Extension: [phone] ([place])

## 2022-10-27 NOTE — TELEPHONE ENCOUNTER
Tried calling pt at home number no answer and pt's spouse's number, no answer, mailbox not set up. IS[facility] sent to pt.

## 2022-10-31 NOTE — TELEPHONE ENCOUNTER
To TT--pt asking for feedback from Dr. Rush with his given symptom.       Home Care nurse called to report pt's indigestion/heart burn, stating concern that this could be cardiac related with given MI and associated similar symptom at time of event (pt had this, only more significant, with MI in past).    S/W pt, he states pcp gave him prilosec, resolved symptoms for 2 days, but he took it at 8am this morning and still with significant indigestion/heart burn. Pt denies sob, chest pain or pressure in chest, no other symptoms.     Instructed that he call pcp back today to get more direction, may warrant more treatment and further work up--potential GI referral.

## 2022-11-18 ENCOUNTER — HOSPITAL ENCOUNTER (OUTPATIENT)
Dept: CARDIOLOGY | Facility: MEDICAL CENTER | Age: 82
End: 2022-11-18
Attending: INTERNAL MEDICINE
Payer: MEDICARE

## 2022-11-18 DIAGNOSIS — I25.10 CORONARY ARTERY DISEASE INVOLVING NATIVE CORONARY ARTERY OF NATIVE HEART WITHOUT ANGINA PECTORIS: ICD-10-CM

## 2022-11-18 DIAGNOSIS — E78.2 MIXED HYPERLIPIDEMIA: ICD-10-CM

## 2022-11-18 DIAGNOSIS — I48.0 PAROXYSMAL ATRIAL FIBRILLATION (HCC): ICD-10-CM

## 2022-11-18 DIAGNOSIS — I10 HTN (HYPERTENSION), MALIGNANT: ICD-10-CM

## 2022-11-18 DIAGNOSIS — Z95.5 STENTED CORONARY ARTERY: ICD-10-CM

## 2022-11-18 PROCEDURE — 93306 TTE W/DOPPLER COMPLETE: CPT

## 2022-11-21 LAB
LV EJECT FRACT  99904: 25
LV EJECT FRACT MOD 2C 99903: 47.01
LV EJECT FRACT MOD 4C 99902: 37.89
LV EJECT FRACT MOD BP 99901: 47.3

## 2022-11-21 PROCEDURE — 93306 TTE W/DOPPLER COMPLETE: CPT | Mod: 26 | Performed by: INTERNAL MEDICINE

## 2022-11-22 ENCOUNTER — OFFICE VISIT (OUTPATIENT)
Dept: CARDIOLOGY | Facility: MEDICAL CENTER | Age: 82
End: 2022-11-22
Payer: MEDICARE

## 2022-11-22 VITALS
DIASTOLIC BLOOD PRESSURE: 70 MMHG | BODY MASS INDEX: 26.41 KG/M2 | WEIGHT: 195 LBS | SYSTOLIC BLOOD PRESSURE: 110 MMHG | RESPIRATION RATE: 16 BRPM | HEART RATE: 80 BPM | OXYGEN SATURATION: 97 % | HEIGHT: 72 IN

## 2022-11-22 DIAGNOSIS — E78.2 MIXED HYPERLIPIDEMIA: ICD-10-CM

## 2022-11-22 DIAGNOSIS — I48.0 PAROXYSMAL ATRIAL FIBRILLATION (HCC): ICD-10-CM

## 2022-11-22 DIAGNOSIS — E11.9 TYPE 2 DIABETES MELLITUS WITHOUT COMPLICATION, UNSPECIFIED WHETHER LONG TERM INSULIN USE (HCC): ICD-10-CM

## 2022-11-22 DIAGNOSIS — R00.1 BRADYCARDIA: ICD-10-CM

## 2022-11-22 DIAGNOSIS — I50.20 ACC/AHA STAGE C SYSTOLIC CONGESTIVE HEART FAILURE (HCC): ICD-10-CM

## 2022-11-22 DIAGNOSIS — Z95.0 CARDIAC PACEMAKER IN SITU: ICD-10-CM

## 2022-11-22 DIAGNOSIS — Z91.89 10 YEAR RISK OF MI OR STROKE 7.5% OR GREATER: ICD-10-CM

## 2022-11-22 DIAGNOSIS — Z79.01 CHRONIC ANTICOAGULATION: ICD-10-CM

## 2022-11-22 DIAGNOSIS — I10 ESSENTIAL HYPERTENSION: ICD-10-CM

## 2022-11-22 PROCEDURE — 99215 OFFICE O/P EST HI 40 MIN: CPT | Performed by: INTERNAL MEDICINE

## 2022-11-22 RX ORDER — SACUBITRIL AND VALSARTAN 24; 26 MG/1; MG/1
1 TABLET, FILM COATED ORAL 2 TIMES DAILY
Qty: 90 TABLET | Refills: 3 | Status: SHIPPED | OUTPATIENT
Start: 2022-11-22

## 2022-11-22 ASSESSMENT — ENCOUNTER SYMPTOMS
SORE THROAT: 0
DIZZINESS: 0
WEAKNESS: 0
ORTHOPNEA: 0
CONSTITUTIONAL NEGATIVE: 1
MUSCULOSKELETAL NEGATIVE: 1
CLAUDICATION: 0
CHILLS: 0
COUGH: 0
GASTROINTESTINAL NEGATIVE: 1
FEVER: 0
PALPITATIONS: 0
HEMOPTYSIS: 0
PND: 0
CARDIOVASCULAR NEGATIVE: 1
SHORTNESS OF BREATH: 0
BRUISES/BLEEDS EASILY: 0
RESPIRATORY NEGATIVE: 1
WHEEZING: 0
LOSS OF CONSCIOUSNESS: 0
NEUROLOGICAL NEGATIVE: 1
STRIDOR: 0
EYES NEGATIVE: 1
SPUTUM PRODUCTION: 0

## 2022-11-22 NOTE — RESULT ENCOUNTER NOTE
Can you please let Nolberto Severino know that result is not entirely normal and I will see patient SOONER than scheduled to discuss?    Thank you,  Jose Raul.

## 2022-11-22 NOTE — PROGRESS NOTES
Chief Complaint   Patient presents with    Congestive Heart Failure     F/v dx: ACC/AHA stage C systolic heart failure (HCC)    Hypertension    Atrial Fibrillation       Subjective     Nolberto Severino is a 82 y.o. male who presents today as a follow-up from a prior provider for history of heart failure pacemaker CAD hypertension hyperlipidemia.  Since he was last seen he is V paced 52% of the time.  His repeat echocardiogram showed an EF of 25%.  Is been on maximal tolerated medical therapy.  He said no chest pain or shortness of breath.    Past Medical History:   Diagnosis Date    Bradycardia     Chronic anticoagulation     Hypertension     Paroxysmal atrial fibrillation (HCC) 2022    Echocardiogram with normal LV size, LVEF 60-65%. Normal RA, LA and RV. Mild AR, mild MR, moderate TR. RVSP 28mmHg.    Syncope      Past Surgical History:   Procedure Laterality Date    PACEMAKER INSERTION Left 2019    Medtronic Advisa SR MRI [id number] implanted in [place].     No family history on file.  Social History     Socioeconomic History    Marital status:      Spouse name: Not on file    Number of children: Not on file    Years of education: Not on file    Highest education level: Not on file   Occupational History    Not on file   Tobacco Use    Smoking status: Former     Packs/day: 0.00     Types: Cigarettes     Quit date: 2018     Years since quittin.8    Smokeless tobacco: Never   Substance and Sexual Activity    Alcohol use: Yes     Comment: rarely    Drug use: Never    Sexual activity: Not on file   Other Topics Concern    Not on file   Social History Narrative    Not on file     Social Determinants of Health     Financial Resource Strain: Not on file   Food Insecurity: Not on file   Transportation Needs: Not on file   Physical Activity: Not on file   Stress: Not on file   Social Connections: Not on file   Intimate Partner Violence: Not on file   Housing Stability: Not on file     No Known  Allergies  Outpatient Encounter Medications as of 11/22/2022   Medication Sig Dispense Refill    sacubitril-valsartan (ENTRESTO) 24-26 MG Tab Take 1 Tablet by mouth 2 times a day. 90 Tablet 3    TRULICITY 1.5 MG/0.5ML Solution Pen-injector       carvedilol (COREG) 6.25 MG Tab Take 1 Tablet by mouth 2 times a day with meals. (Patient taking differently: Take 6.25 mg by mouth 2 times a day with meals. 3.125mg) 60 Tablet 11    furosemide (LASIX) 20 MG Tab Take 1 Tablet by mouth every day. 30 Tablet 3    apixaban (ELIQUIS) 5mg Tab Take 1 Tablet by mouth 2 times a day. 180 Tablet 1    atorvastatin (LIPITOR) 80 MG tablet Take 80 mg by mouth every evening.      clopidogrel (PLAVIX) 75 MG Tab Take 75 mg by mouth every day.      spironolactone (ALDACTONE) 25 MG Tab Take 25 mg by mouth every day.      gabapentin (NEURONTIN) 300 MG Cap Take 300 mg by mouth 2 times a day.      Insulin Degludec 100 UNIT/ML Solution Inject  under the skin.      dapagliflozin propanediol (FARXIGA) 10 MG Tab Take 1 Tablet by mouth every day. 90 Tablet 3    NOVOLOG FLEXPEN 100 UNIT/ML solution for injection Inject  under the skin 3 times a day before meals.      FREESTYLE LITE strip       metFORMIN (GLUCOPHAGE) 500 MG Tab Take 500 mg by mouth every day.      [DISCONTINUED] aspirin EC (ECOTRIN) 81 MG Tablet Delayed Response Take 81 mg by mouth every day.      [DISCONTINUED] sacubitril-valsartan (ENTRESTO) 24-26 MG Tab Take 1 Tablet by mouth 2 times a day. 60 Tablet 3    [DISCONTINUED] acetaminophen (TYLENOL) tablet 750 mg        No facility-administered encounter medications on file as of 11/22/2022.     Review of Systems   Constitutional: Negative.  Negative for chills, fever and malaise/fatigue.   HENT: Negative.  Negative for sore throat.    Eyes: Negative.    Respiratory: Negative.  Negative for cough, hemoptysis, sputum production, shortness of breath, wheezing and stridor.    Cardiovascular: Negative.  Negative for chest pain, palpitations,  orthopnea, claudication, leg swelling and PND.   Gastrointestinal: Negative.    Genitourinary: Negative.    Musculoskeletal: Negative.    Skin: Negative.    Neurological: Negative.  Negative for dizziness, loss of consciousness and weakness.   Endo/Heme/Allergies: Negative.  Does not bruise/bleed easily.   All other systems reviewed and are negative.           Objective     /70 (BP Location: Left arm, Patient Position: Sitting, BP Cuff Size: Adult)   Pulse 80   Resp 16   Ht 1.829 m (6')   Wt 88.5 kg (195 lb)   SpO2 97%   BMI 26.45 kg/m²     Physical Exam  Vitals and nursing note reviewed.   Constitutional:       General: He is not in acute distress.     Appearance: He is well-developed. He is not diaphoretic.   HENT:      Head: Normocephalic and atraumatic.      Right Ear: External ear normal.      Left Ear: External ear normal.      Nose: Nose normal.      Mouth/Throat:      Pharynx: No oropharyngeal exudate.   Eyes:      General: No scleral icterus.        Right eye: No discharge.         Left eye: No discharge.      Conjunctiva/sclera: Conjunctivae normal.      Pupils: Pupils are equal, round, and reactive to light.   Neck:      Vascular: No JVD.   Cardiovascular:      Rate and Rhythm: Normal rate and regular rhythm.      Heart sounds: No murmur heard.    No friction rub. No gallop.   Pulmonary:      Effort: Pulmonary effort is normal. No respiratory distress.      Breath sounds: No stridor. No wheezing or rales.   Chest:      Chest wall: No tenderness.   Abdominal:      General: There is no distension.      Palpations: Abdomen is soft.      Tenderness: There is no guarding.   Musculoskeletal:         General: No tenderness or deformity. Normal range of motion.      Cervical back: Neck supple.   Skin:     General: Skin is warm and dry.      Coloration: Skin is not pale.      Findings: No erythema or rash.   Neurological:      Mental Status: He is alert.      Cranial Nerves: No cranial nerve deficit.       Motor: No abnormal muscle tone.      Coordination: Coordination normal.      Deep Tendon Reflexes: Reflexes are normal and symmetric. Reflexes normal.   Psychiatric:         Behavior: Behavior normal.         Thought Content: Thought content normal.         Judgment: Judgment normal.          Echocardiogram: Dated 11/18/2022 personally viewed inter myself showing an EF of 25%.    Assessment & Plan     1. Cardiac pacemaker in situ        2. Essential hypertension  CBC W/ DIFF W/O PLATELETS      3. Bradycardia  Comp Metabolic Panel      4. Paroxysmal atrial fibrillation (HCC)  CBC W/ DIFF W/O PLATELETS    Comp Metabolic Panel    REFERRAL TO CARDIOLOGY    sacubitril-valsartan (ENTRESTO) 24-26 MG Tab      5. 10 year risk of MI or stroke 7.5% or greater  CBC W/ DIFF W/O PLATELETS    Comp Metabolic Panel    sacubitril-valsartan (ENTRESTO) 24-26 MG Tab      6. Type 2 diabetes mellitus without complication, unspecified whether long term insulin use (HCC)  CBC W/ DIFF W/O PLATELETS    Comp Metabolic Panel      7. Chronic anticoagulation        8. Mixed hyperlipidemia        9. ACC/AHA stage C systolic congestive heart failure (HCC)  REFERRAL TO CARDIOLOGY    sacubitril-valsartan (ENTRESTO) 24-26 MG Tab          Medical Decision Making: Today's Assessment/Status/Plan:        82-year-old male with heart failure with reduced ejection fraction possibly from a combination of ischemic cardiomyopathy or chronic pacing.  With a QRS nearly 150 and 50% pacing I will send him to EP for consideration of CRT.  He is also maximized on his medical therapy with an EF of 25% so we will also consider upgrade to a defibrillator depending on whether or not he is CRT or dual-chamber pacemaker needed.  I explained to him the risk benefits and alternatives of proceeding to CRT and/or defibrillator placement and he agrees.  I will check his labs.  I will see him back in 2 months.

## 2022-12-06 DIAGNOSIS — I48.0 PAROXYSMAL ATRIAL FIBRILLATION (HCC): ICD-10-CM

## 2022-12-06 DIAGNOSIS — E11.9 TYPE 2 DIABETES MELLITUS WITHOUT COMPLICATION, UNSPECIFIED WHETHER LONG TERM INSULIN USE (HCC): ICD-10-CM

## 2022-12-06 DIAGNOSIS — I10 ESSENTIAL HYPERTENSION: ICD-10-CM

## 2022-12-06 DIAGNOSIS — R00.1 BRADYCARDIA: ICD-10-CM

## 2022-12-06 DIAGNOSIS — Z91.89 10 YEAR RISK OF MI OR STROKE 7.5% OR GREATER: ICD-10-CM

## 2022-12-20 ENCOUNTER — TELEPHONE (OUTPATIENT)
Dept: CARDIOLOGY | Facility: MEDICAL CENTER | Age: 82
End: 2022-12-20

## 2022-12-20 ENCOUNTER — OFFICE VISIT (OUTPATIENT)
Dept: CARDIOLOGY | Facility: MEDICAL CENTER | Age: 82
End: 2022-12-20
Attending: INTERNAL MEDICINE
Payer: MEDICARE

## 2022-12-20 VITALS
DIASTOLIC BLOOD PRESSURE: 80 MMHG | HEART RATE: 75 BPM | BODY MASS INDEX: 26.01 KG/M2 | RESPIRATION RATE: 12 BRPM | OXYGEN SATURATION: 96 % | WEIGHT: 192 LBS | SYSTOLIC BLOOD PRESSURE: 110 MMHG | HEIGHT: 72 IN

## 2022-12-20 DIAGNOSIS — I50.22 CHRONIC SYSTOLIC HEART FAILURE (HCC): ICD-10-CM

## 2022-12-20 DIAGNOSIS — I48.0 PAROXYSMAL ATRIAL FIBRILLATION (HCC): ICD-10-CM

## 2022-12-20 DIAGNOSIS — Z95.0 CARDIAC PACEMAKER IN SITU: ICD-10-CM

## 2022-12-20 DIAGNOSIS — R00.1 BRADYCARDIA: ICD-10-CM

## 2022-12-20 PROCEDURE — 93279 PRGRMG DEV EVAL PM/LDLS PM: CPT | Performed by: INTERNAL MEDICINE

## 2022-12-20 PROCEDURE — 99205 OFFICE O/P NEW HI 60 MIN: CPT | Mod: 25 | Performed by: INTERNAL MEDICINE

## 2022-12-20 PROCEDURE — 93000 ELECTROCARDIOGRAM COMPLETE: CPT | Mod: 59 | Performed by: INTERNAL MEDICINE

## 2022-12-20 NOTE — TELEPHONE ENCOUNTER
----- Message from Hipolito Clay M.D. sent at 12/20/2022  2:55 PM PST -----  Please schedule BiV ICD upgrade, hold Eliquis 1 day, thanks

## 2022-12-20 NOTE — PROGRESS NOTES
Arrhythmia Clinic Note (New patient)     DOS: 2022    Referring physician: Dr Sommers    Chief complaint/Reason for consult: Afib with heart failure    HPI: 83 y/o M with h/o syncope and heart block in setting of persistent afib s/p VVI pacemaker in 2019. He has subsequently developed worsening systolic heart failure EF 25%. Sent to EP for consideration of BiV ICD upgrade given >3 months GDMT and no revascularization in 3 months. Pt with no change in recent symptoms, present with his wife and amenable to proceed with upgrade. Discussed goals of care which support ICD.     ROS (+ highlighted in bold):  Constitutional: Fevers/chills/fatigue/weightloss  HEENT: Blurry vision/eye pain/sore throat/hearing loss  Respiratory: Shortness of breath/cough  Cardiovascular: Chest pain/palpitations/edema/orthopnea/syncope  GI: Nausea/vomitting/diarrhea  MSK: Arthralgias/myagias/muscle weakness  Skin: Rash/sores  Neurological: Numbness/tremors/vertigo  Endocrine: Excessive thirst/polyuria/cold intolerance/heat intolerance  Psych: Depression/anxiety    Past Medical History:   Diagnosis Date    Bradycardia     Chronic anticoagulation     Hypertension     Paroxysmal atrial fibrillation (HCC) 2022    Echocardiogram with normal LV size, LVEF 60-65%. Normal RA, LA and RV. Mild AR, mild MR, moderate TR. RVSP 28mmHg.    Syncope        Past Surgical History:   Procedure Laterality Date    PACEMAKER INSERTION Left 2019    Medtronic Advisa SR MRI [id number] implanted in [place].       Social History     Socioeconomic History    Marital status:      Spouse name: Not on file    Number of children: Not on file    Years of education: Not on file    Highest education level: Not on file   Occupational History    Not on file   Tobacco Use    Smoking status: Former     Packs/day: 0.00     Types: Cigarettes     Quit date: 2018     Years since quittin.9    Smokeless tobacco: Never   Substance and Sexual Activity    Alcohol use: Yes      Comment: rarely    Drug use: Never    Sexual activity: Not on file   Other Topics Concern    Not on file   Social History Narrative    Not on file     Social Determinants of Health     Financial Resource Strain: Not on file   Food Insecurity: Not on file   Transportation Needs: Not on file   Physical Activity: Not on file   Stress: Not on file   Social Connections: Not on file   Intimate Partner Violence: Not on file   Housing Stability: Not on file       History reviewed. No pertinent family history.  Denies family history of premature CAD    No Known Allergies    Current Outpatient Medications   Medication Sig Dispense Refill    sacubitril-valsartan (ENTRESTO) 24-26 MG Tab Take 1 Tablet by mouth 2 times a day. 90 Tablet 3    TRULICITY 1.5 MG/0.5ML Solution Pen-injector       carvedilol (COREG) 6.25 MG Tab Take 1 Tablet by mouth 2 times a day with meals. (Patient taking differently: Take 6.25 mg by mouth 2 times a day with meals. 3.125mg) 60 Tablet 11    furosemide (LASIX) 20 MG Tab Take 1 Tablet by mouth every day. 30 Tablet 3    apixaban (ELIQUIS) 5mg Tab Take 1 Tablet by mouth 2 times a day. 180 Tablet 1    atorvastatin (LIPITOR) 80 MG tablet Take 80 mg by mouth every evening.      clopidogrel (PLAVIX) 75 MG Tab Take 75 mg by mouth every day.      spironolactone (ALDACTONE) 25 MG Tab Take 25 mg by mouth every day.      gabapentin (NEURONTIN) 300 MG Cap Take 300 mg by mouth 2 times a day.      Insulin Degludec 100 UNIT/ML Solution Inject  under the skin.      dapagliflozin propanediol (FARXIGA) 10 MG Tab Take 1 Tablet by mouth every day. 90 Tablet 3    NOVOLOG FLEXPEN 100 UNIT/ML solution for injection Inject  under the skin 3 times a day before meals.      FREESTYLE LITE strip       metFORMIN (GLUCOPHAGE) 500 MG Tab Take 500 mg by mouth every day.       No current facility-administered medications for this visit.       Physical Exam:  Vitals:    12/20/22 1326   BP: 110/80   BP Location: Left arm   Patient  Position: Sitting   BP Cuff Size: Adult   Pulse: 75   Resp: 12   SpO2: 96%   Weight: 87.1 kg (192 lb)   Height: 1.829 m (6')     General appearance: NAD, conversant   Eyes: anicteric sclerae, moist conjunctivae; no lid-lag; PERRLA  HENT: Atraumatic; oropharynx clear with moist mucous membranes and no mucosal ulcerations; normal hard and soft palate  Neck: Trachea midline; FROM, supple, no thyromegaly or lymphadenopathy  Lungs: CTA, with normal respiratory effort and no intercostal retractions  CV: RRR, no MRGs, no JVD   Abdomen: Soft, non-tender; no masses or HSM  Extremities: No peripheral edema or extremity lymphadenopathy  Skin: Normal temperature, turgor and texture; no rash, ulcers or subcutaneous nodules  Psych: Appropriate affect, alert and oriented to person, place and time    Data:  Labs reviewed from [facility] scanned into media tab    PPM interrogation: VVI device pacing between 30-50% of the time    Prior echo/stress results reviewed: EF 25%    EKG interpreted by me: V pacing with afib    Impression/Plan:  1. Paroxysmal atrial fibrillation (HCC)  EKG      2. Chronic systolic heart failure (HCC)  CL-BIV UPGRADE (FROM AICD OR PPM)        Persistent Afib  Chronic systolic heart failure EF 25%  LBBB  Heart block  Hypercoagulable state due to afib    - I recommend CRT-D upgrade. The risk, benefits, and alternatives to ICD placement were discussed in great detail, specific risks mentioned including bleeding, infection, cardiac perforation with possible tamponade requiring pericardiocentesis or open heart surgery. The [place] patient decision making tool was used to decide on ICD implantation.  In addition the possibility of lead dislodgment, inappropriate shocks, pneumothorax, hemothorax were discussed. Also mentioned were the possibility of death, stroke, and myocardial infarction. The patient verbalized understanding of these potential complications and wishes to proceed with this procedure.     - Existing RV  "P/S lead can be placed in atrial port to preserve MRI compatibility and avoid need for lead removal. Prior notes remarked that the vein is \"tight\" due to subclavian/rib proximity.    Plan BiV ICD upgrade      Hipolito Clay MD  Cardiac Electrophysiology    "

## 2022-12-20 NOTE — TELEPHONE ENCOUNTER
Tried to call patient to schedule. His mailbox is full, I am unable to leave a message. I will try again at a later time.

## 2022-12-21 NOTE — TELEPHONE ENCOUNTER
Tried to call patient to schedule this procedure. This voicemail is full. I was unable to leave a message. I sent a my chart message requesting he give me a call to schedule.

## 2022-12-23 NOTE — TELEPHONE ENCOUNTER
Patient scheduled for upgrade to BiV ICD on 1-9-23 with Dr. Clay. Patient has been instructed to check in at 8:00 for 10:00 case time. Message sent to roe Iglesias with [employer] notified.

## 2022-12-30 LAB — EKG IMPRESSION: NORMAL

## 2023-01-04 ENCOUNTER — PRE-ADMISSION TESTING (OUTPATIENT)
Dept: ADMISSIONS | Facility: MEDICAL CENTER | Age: 83
End: 2023-01-04
Attending: INTERNAL MEDICINE
Payer: MEDICARE

## 2023-01-04 NOTE — PREPROCEDURE INSTRUCTIONS
Patient pre-admit completed.  Patient lives out of area and unable to come to town for pre-op labs.  Pre-op testing to be done day of surgery.

## 2023-01-09 ENCOUNTER — HOSPITAL ENCOUNTER (OUTPATIENT)
Facility: MEDICAL CENTER | Age: 83
End: 2023-01-09
Attending: INTERNAL MEDICINE | Admitting: INTERNAL MEDICINE
Payer: MEDICARE

## 2023-01-09 ENCOUNTER — APPOINTMENT (OUTPATIENT)
Dept: RADIOLOGY | Facility: MEDICAL CENTER | Age: 83
End: 2023-01-09
Attending: INTERNAL MEDICINE
Payer: MEDICARE

## 2023-01-09 ENCOUNTER — APPOINTMENT (OUTPATIENT)
Dept: CARDIOLOGY | Facility: MEDICAL CENTER | Age: 83
End: 2023-01-09
Attending: INTERNAL MEDICINE
Payer: MEDICARE

## 2023-01-09 VITALS
OXYGEN SATURATION: 97 % | SYSTOLIC BLOOD PRESSURE: 115 MMHG | DIASTOLIC BLOOD PRESSURE: 72 MMHG | BODY MASS INDEX: 25.02 KG/M2 | RESPIRATION RATE: 18 BRPM | TEMPERATURE: 96.5 F | HEIGHT: 72 IN | WEIGHT: 184.75 LBS | HEART RATE: 68 BPM

## 2023-01-09 DIAGNOSIS — I50.22 CHRONIC SYSTOLIC HEART FAILURE (HCC): ICD-10-CM

## 2023-01-09 LAB
ALBUMIN SERPL BCP-MCNC: 4.2 G/DL (ref 3.2–4.9)
ALBUMIN/GLOB SERPL: 1.8 G/DL
ALP SERPL-CCNC: 63 U/L (ref 30–99)
ALT SERPL-CCNC: 17 U/L (ref 2–50)
ANION GAP SERPL CALC-SCNC: 12 MMOL/L (ref 7–16)
AST SERPL-CCNC: 15 U/L (ref 12–45)
BASOPHILS # BLD AUTO: 0.4 % (ref 0–1.8)
BASOPHILS # BLD: 0.03 K/UL (ref 0–0.12)
BILIRUB SERPL-MCNC: 1.2 MG/DL (ref 0.1–1.5)
BUN SERPL-MCNC: 26 MG/DL (ref 8–22)
CALCIUM ALBUM COR SERPL-MCNC: 8.9 MG/DL (ref 8.5–10.5)
CALCIUM SERPL-MCNC: 9.1 MG/DL (ref 8.5–10.5)
CHLORIDE SERPL-SCNC: 108 MMOL/L (ref 96–112)
CO2 SERPL-SCNC: 22 MMOL/L (ref 20–33)
CREAT SERPL-MCNC: 1.22 MG/DL (ref 0.5–1.4)
EKG IMPRESSION: NORMAL
EKG IMPRESSION: NORMAL
EOSINOPHIL # BLD AUTO: 0.18 K/UL (ref 0–0.51)
EOSINOPHIL NFR BLD: 2.5 % (ref 0–6.9)
ERYTHROCYTE [DISTWIDTH] IN BLOOD BY AUTOMATED COUNT: 52.8 FL (ref 35.9–50)
GFR SERPLBLD CREATININE-BSD FMLA CKD-EPI: 59 ML/MIN/1.73 M 2
GLOBULIN SER CALC-MCNC: 2.3 G/DL (ref 1.9–3.5)
GLUCOSE SERPL-MCNC: 121 MG/DL (ref 65–99)
HCT VFR BLD AUTO: 47.3 % (ref 42–52)
HGB BLD-MCNC: 15.4 G/DL (ref 14–18)
IMM GRANULOCYTES # BLD AUTO: 0.02 K/UL (ref 0–0.11)
IMM GRANULOCYTES NFR BLD AUTO: 0.3 % (ref 0–0.9)
INR PPP: 1.18 (ref 0.87–1.13)
LYMPHOCYTES # BLD AUTO: 1.79 K/UL (ref 1–4.8)
LYMPHOCYTES NFR BLD: 25 % (ref 22–41)
MCH RBC QN AUTO: 31.4 PG (ref 27–33)
MCHC RBC AUTO-ENTMCNC: 32.6 G/DL (ref 33.7–35.3)
MCV RBC AUTO: 96.3 FL (ref 81.4–97.8)
MONOCYTES # BLD AUTO: 0.88 K/UL (ref 0–0.85)
MONOCYTES NFR BLD AUTO: 12.3 % (ref 0–13.4)
NEUTROPHILS # BLD AUTO: 4.25 K/UL (ref 1.82–7.42)
NEUTROPHILS NFR BLD: 59.5 % (ref 44–72)
NRBC # BLD AUTO: 0 K/UL
NRBC BLD-RTO: 0 /100 WBC
PLATELET # BLD AUTO: 296 K/UL (ref 164–446)
PMV BLD AUTO: 10 FL (ref 9–12.9)
POTASSIUM SERPL-SCNC: 4.3 MMOL/L (ref 3.6–5.5)
PROT SERPL-MCNC: 6.5 G/DL (ref 6–8.2)
PROTHROMBIN TIME: 14.8 SEC (ref 12–14.6)
RBC # BLD AUTO: 4.91 M/UL (ref 4.7–6.1)
SODIUM SERPL-SCNC: 142 MMOL/L (ref 135–145)
WBC # BLD AUTO: 7.2 K/UL (ref 4.8–10.8)

## 2023-01-09 PROCEDURE — 80053 COMPREHEN METABOLIC PANEL: CPT

## 2023-01-09 PROCEDURE — 160035 HCHG PACU - 1ST 60 MINS PHASE I

## 2023-01-09 PROCEDURE — 160036 HCHG PACU - EA ADDL 30 MINS PHASE I

## 2023-01-09 PROCEDURE — 33249 INSJ/RPLCMT DEFIB W/LEAD(S): CPT | Performed by: INTERNAL MEDICINE

## 2023-01-09 PROCEDURE — 33233 REMOVAL OF PM GENERATOR: CPT | Performed by: INTERNAL MEDICINE

## 2023-01-09 PROCEDURE — 93005 ELECTROCARDIOGRAM TRACING: CPT | Performed by: INTERNAL MEDICINE

## 2023-01-09 PROCEDURE — 36415 COLL VENOUS BLD VENIPUNCTURE: CPT

## 2023-01-09 PROCEDURE — 160002 HCHG RECOVERY MINUTES (STAT)

## 2023-01-09 PROCEDURE — 99152 MOD SED SAME PHYS/QHP 5/>YRS: CPT | Performed by: INTERNAL MEDICINE

## 2023-01-09 PROCEDURE — 85610 PROTHROMBIN TIME: CPT

## 2023-01-09 PROCEDURE — 33225 L VENTRIC PACING LEAD ADD-ON: CPT | Mod: 52 | Performed by: INTERNAL MEDICINE

## 2023-01-09 PROCEDURE — 71045 X-RAY EXAM CHEST 1 VIEW: CPT

## 2023-01-09 PROCEDURE — 700111 HCHG RX REV CODE 636 W/ 250 OVERRIDE (IP)

## 2023-01-09 PROCEDURE — 33249 INSJ/RPLCMT DEFIB W/LEAD(S): CPT

## 2023-01-09 PROCEDURE — 85025 COMPLETE CBC W/AUTO DIFF WBC: CPT

## 2023-01-09 PROCEDURE — 700101 HCHG RX REV CODE 250

## 2023-01-09 PROCEDURE — 160046 HCHG PACU - 1ST 60 MINS PHASE II

## 2023-01-09 PROCEDURE — 700117 HCHG RX CONTRAST REV CODE 255: Performed by: INTERNAL MEDICINE

## 2023-01-09 PROCEDURE — 93010 ELECTROCARDIOGRAM REPORT: CPT | Mod: 59,76 | Performed by: INTERNAL MEDICINE

## 2023-01-09 RX ORDER — MIDAZOLAM HYDROCHLORIDE 1 MG/ML
INJECTION INTRAMUSCULAR; INTRAVENOUS
Status: COMPLETED
Start: 2023-01-09 | End: 2023-01-09

## 2023-01-09 RX ORDER — FUROSEMIDE 20 MG/1
20 TABLET ORAL
COMMUNITY
End: 2023-06-15 | Stop reason: SDUPTHER

## 2023-01-09 RX ORDER — BUPIVACAINE HYDROCHLORIDE 2.5 MG/ML
INJECTION, SOLUTION EPIDURAL; INFILTRATION; INTRACAUDAL
Status: COMPLETED
Start: 2023-01-09 | End: 2023-01-09

## 2023-01-09 RX ORDER — INSULIN DEGLUDEC 100 U/ML
20-40 INJECTION, SOLUTION SUBCUTANEOUS EVERY EVENING
COMMUNITY

## 2023-01-09 RX ORDER — LIDOCAINE HYDROCHLORIDE 20 MG/ML
INJECTION, SOLUTION INFILTRATION; PERINEURAL
Status: COMPLETED
Start: 2023-01-09 | End: 2023-01-09

## 2023-01-09 RX ORDER — CARVEDILOL 3.12 MG/1
3.12 TABLET ORAL 2 TIMES DAILY WITH MEALS
COMMUNITY
End: 2023-12-18 | Stop reason: SDUPTHER

## 2023-01-09 RX ORDER — CEFAZOLIN SODIUM 1 G/3ML
INJECTION, POWDER, FOR SOLUTION INTRAMUSCULAR; INTRAVENOUS
Status: COMPLETED
Start: 2023-01-09 | End: 2023-01-09

## 2023-01-09 RX ORDER — CEPHALEXIN 500 MG/1
500 CAPSULE ORAL 3 TIMES DAILY
Qty: 15 CAPSULE | Refills: 0 | Status: SHIPPED | OUTPATIENT
Start: 2023-01-09 | End: 2023-02-22

## 2023-01-09 RX ADMIN — MIDAZOLAM HYDROCHLORIDE 2 MG: 1 INJECTION, SOLUTION INTRAMUSCULAR; INTRAVENOUS at 12:18

## 2023-01-09 RX ADMIN — FENTANYL CITRATE 100 MCG: 50 INJECTION, SOLUTION INTRAMUSCULAR; INTRAVENOUS at 13:12

## 2023-01-09 RX ADMIN — BUPIVACAINE HYDROCHLORIDE: 2.5 INJECTION, SOLUTION EPIDURAL; INFILTRATION; INTRACAUDAL; PERINEURAL at 12:19

## 2023-01-09 RX ADMIN — FENTANYL CITRATE 100 MCG: 50 INJECTION, SOLUTION INTRAMUSCULAR; INTRAVENOUS at 12:18

## 2023-01-09 RX ADMIN — MIDAZOLAM HYDROCHLORIDE 0.5 MG: 1 INJECTION, SOLUTION INTRAMUSCULAR; INTRAVENOUS at 13:12

## 2023-01-09 RX ADMIN — IOHEXOL 40 ML: 350 INJECTION, SOLUTION INTRAVENOUS at 13:45

## 2023-01-09 RX ADMIN — CEFAZOLIN 2000 MG: 330 INJECTION, POWDER, FOR SOLUTION INTRAMUSCULAR; INTRAVENOUS at 12:18

## 2023-01-09 RX ADMIN — LIDOCAINE HYDROCHLORIDE: 20 INJECTION, SOLUTION INFILTRATION; PERINEURAL at 12:19

## 2023-01-09 ASSESSMENT — PAIN DESCRIPTION - PAIN TYPE
TYPE: SURGICAL PAIN

## 2023-01-09 NOTE — PROGRESS NOTES
Med rec updated and complete, per pt   Allergies reviewed, per pt  Interviewed pt with wife at bedside with permission from pt.  Pt reports that he has not taken his NOVOLOG in the last 30 days.  Pts wife had a list of medications at bedside that she read to this writer

## 2023-01-09 NOTE — PROGRESS NOTES
Seen in afternoon same day discharge EP rounds. S/P upgrade of single chamber PPM to single chamber ICD.  Attempt was made for addition of a CS lead, however unsuccessful secondary to unfavorable anatomy after significant attempt.      Conclusions per Dr Clay's Op Note 1/9/23:  IMPLANTED DEVICE INFORMATION:    Pulse generator is a MYOMO model RQSR8Q6   Serial number [id number]   LEAD INFORMATION:  1. Right ventricular lead is a Medtronic model 6935M, serial number [id number], R wave 7.0 millivolts, threshold 0.75 Volts , pacing impedance 608 Ohms.   DEVICE PROGRAMMING:    Rene therapy: VVI 50  Tachy therapy:  VF <300ms FVT >250ms Monitor <400ms  DEFIBRILLATION THRESHOLD TESTING:    Deferred  FLUOROSCOPY TIME: 22.3 min  SUMMARY/CONCLUSIONS:  1. Successful ICD upgrade, significantly laborious and difficult  2. Unsuccessful LV lead attempt     Monitored rhythm is paced thoughout his monitored recovery. Vital signs are stable. Post op CXR no evidence of PTX and device interrogation shows normal function of the device. ICD with pressure dressing intact, will be changed per protocol prior to discharge.     I have verified that new discharge prescriptions have been sent to correct pharmacy and provided education on importance of completing antibiotics.    Discharge instructions discussed with patient:  ACTIVITY/SPECIFIC OUTPATIENT DISCHARGE INSTRUCTIONS  ICD Discharge Instructions/[facility]  1.  No showers for one week; may take sponge bath.  Keep dressing dry & in place until seen at for you follow up visit at the cardiology office. Report s/s of infection such as warmth/redness/drainage/swelling at site or fever/chills.  2.  No lifting over 10 lbs for six weeks.  3.  Do not raise affected arm above shoulder level or behind head for six weeks.  4.  Avoid excessive pushing, pulling, or twisting for six weeks.  5.  No driving for the first week.  6.  Call our office ([phone]) if you notice any increased  swelling, redness, warmth, or drainage at the implant site.  7.  Call our office if you develop fever > 101F or uncontrolled pain.  8.  No MRI's for 6 weeks if you have a MRI compatible device.  9.  No dental work or cleanings for 3 months.  10.  May remove arm sling after one day, but please wear if you have trouble remembering to keep your arm down.  11. Do not place cell phones or mobile devices directly over implanted device.   12. Your follow-up appointments will be done at approximately 1 week, 6 weeks, then every 3-4 months.  ICD Instructions  If you have one shock: if you are feeling fine please notify the cardiology office and be seen for check.  If feeling poorly after shock you need to  call 911.  If you have Two Shocks or more in 24 hours: call 911.

## 2023-01-09 NOTE — OP REPORT
Electrophysiology Procedure Note  [facility]      Date of procedure: 1/9/2023     Procedure Performed: Placement of AICD, Removal of pacemaker generator, placement of LV lead (unsuccessful, aborted), moderate sedation administered by RN and supervised by physician.    Indication: Chronic  systolic heart failure, NYHA II, EF <35%, ICM    Physician(s): Hipolito Clay M.D.    Anesthesia: Moderate sedation,  start time 1143, stop time 1315, Versed 2.5mg, Fentanyl 200mcg  the moderate sedation document has been reviewed, signed and scanned into media     Specimen(s) Removed: None     Estimated Blood Loss:  30cc    Complications: None    Pre Procedure ECG: Afib    Post Procedure ECG: Afib    DESCRIPTION OF PROCEDURE: After informed written consent, the patient was brought to the electrophysiology lab in the fasting, unsedated state. The patient was prepped and draped in the usual sterile fashion. The procedure was performed under moderate sedation with local anesthetic. A left infraclavicular incision was made with a scalpel and the pre-pectoral device pocket was created using a combination of blunt dissection and electrocautery. The modified Seldinger technique was used to gain access to the left axillary vein times 2.    There was notably stenosis of the vein which made passage of sheaths highly challenging. Passage of an ICD lead into the heart was only successful after multiple serial dilations and prolonged attempts at passage of a long 8F sheath. Despite this the RV lead was still extremely difficult to manipulate and passage of curved stylets was extremely challenging. Eventually we were able to advance the RV lead into place and fixate the lead.    The second access was used to attempt to achieve LV access. The sheath could be passed into the RA but despite use of multiple sheaths and tools the CS body could not be located and manipulation of the sheath was likewise very difficult. This was  eventually abandoned after a prolonged period of attempts.     The device pocket was irrigated with antibiotic solution, inspected, and no bleeding was seen. The pacemaker was removed and the RV P/S lead was disconnected and inserted into the ICD RA port. The leads were connected to the ICD pulse generator and the device was inserted into the pocket. The wound was closed with three layers of absorbable sutures and covered with Steri-Strips.     I personally supervised the administration of moderate sedation by the RN and observed the level of consciousness and physiologic status throughout the procedure.    Following recovery from sedation, the patient was transferred to a monitored bed in good condition.    IMPLANTED DEVICE INFORMATION:    Pulse generator is a Medtronic model QCNM7Y4   Serial number [id number]      LEAD INFORMATION:  1. Right ventricular lead is a Medtronic model 6935M, serial number [id number], R wave 7.0 millivolts, threshold 0.75 Volts , pacing impedance 608 Ohms.       DEVICE PROGRAMMING:    Rene therapy: VVI 50  Tachy therapy:  VF <300ms FVT >250ms Monitor <400ms    DEFIBRILLATION THRESHOLD TESTING:    Deferred    FLUOROSCOPY TIME: 22.3 min    SUMMARY/CONCLUSIONS:  1. Successful ICD upgrade, significantly laborious and difficult  2. Unsuccessful LV lead attempt    RECOMMENDATIONS:  1. Admit to monitored bed.  2. PA and lateral chest x-ray.  3. Implantable cardioverter defibrillator interrogation prior to hospital discharge.  4. Follow-up in device clinic for wound check and device interrogation.

## 2023-01-09 NOTE — DISCHARGE INSTRUCTIONS
HOME CARE INSTRUCTIONS    ACTIVITY: Rest and take it easy for the first 24 hours.  A responsible adult is recommended to remain with you during that time.  It is normal to feel sleepy.  We encourage you to not do anything that requires balance, judgment or coordination.    FOR 24 HOURS DO NOT:  Drive, operate machinery or run household appliances.  Drink beer or alcoholic beverages.  Make important decisions or sign legal documents.    SPECIAL INSTRUCTIONS: ICD Discharge Instructions/[facility]    1.  No showers until seen in follow up; may take sponge bath.  Keep dressing dry & in place until seen at for you follow up visit at the cardiology office.     2.  No lifting over 10 lbs with affected arm for six weeks.  3.  Do not raise affected arm above shoulder level or behind head for six weeks.  4.  Avoid excessive pushing, pulling, or twisting for six weeks.  5.  No driving for the first week.  6.  Call our office ([phone]) if you notice any increased swelling, redness, warmth, or drainage at the implant site.  7.  Needs to be seen in emergency if you develop fever > 101F or uncontrolled pain.  8.  Always check with device clinic before any planned MRI to see if device is MRI compatible.  9.  No routine dental work or cleanings for 3 months.  10.  May remove arm sling after one day, but please wear if you have trouble remembering to keep your arm down.  Please wear at night as a reminder.   11. Do not place cell phones or mobile devices directly over implanted device.     ICD Instructions  If you have one shock: if you are feeling fine please notify the cardiology office and be seen for check.  If feeling poorly after shock you need to  call 911.  If you have Two Shocks or more in 24 hours: call 911.      DIET: To avoid nausea, slowly advance diet as tolerated, avoiding spicy or greasy foods for the first day.  Add more substantial food to your diet according to your physician's instructions.  Babies can  be fed formula or breast milk as soon as they are hungry.  INCREASE FLUIDS AND FIBER TO AVOID CONSTIPATION.    MEDICATIONS: Resume taking daily medication.  Take prescribed pain medication with food.  If no medication is prescribed, you may take non-aspirin pain medication if needed.  PAIN MEDICATION CAN BE VERY CONSTIPATING.  Take a stool softener or laxative such as senokot, pericolace, or milk of magnesia if needed.    Prescription given for _Keflex_.     A follow-up appointment should be arranged with your doctor; call to schedule.    You should CALL YOUR PHYSICIAN if you develop:  Fever greater than 101 degrees F.  Pain not relieved by medication, or persistent nausea or vomiting.  Excessive bleeding (blood soaking through dressing) or unexpected drainage from the wound.  Extreme redness or swelling around the incision site, drainage of pus or foul smelling drainage.  Inability to urinate or empty your bladder within 8 hours.  Problems with breathing or chest pain.    You should call 911 if you develop problems with breathing or chest pain.  If you are unable to contact your doctor or surgical center, you should go to the nearest emergency room or urgent care center.  Physician's telephone #: [phone]    MILD FLU-LIKE SYMPTOMS ARE NORMAL.  YOU MAY EXPERIENCE GENERALIZED MUSCLE ACHES, THROAT IRRITATION, HEADACHE AND/OR SOME NAUSEA.    If any questions arise, call your doctor.  If your doctor is not available, please feel free to call the Surgical Center at [phone].  The Center is open Monday through Friday from 7AM to 7PM.      A registered nurse may call you a few days after your surgery to see how you are doing after your procedure.    You may also receive a survey in the mail within the next two weeks and we ask that you take a few moments to complete the survey and return it to us.  Our goal is to provide you with very good care and we value your comments.     Depression / Suicide Risk    As you  are discharged from this [facility] facility, it is important to learn how to keep safe from harming yourself.    Recognize the warning signs:  Abrupt changes in personality, positive or negative- including increase in energy   Giving away possessions  Change in eating patterns- significant weight changes-  positive or negative  Change in sleeping patterns- unable to sleep or sleeping all the time   Unwillingness or inability to communicate  Depression  Unusual sadness, discouragement and loneliness  Talk of wanting to die  Neglect of personal appearance   Rebelliousness- reckless behavior  Withdrawal from people/activities they love  Confusion- inability to concentrate     If you or a loved one observes any of these behaviors or has concerns about self-harm, here's what you can do:  Talk about it- your feelings and reasons for harming yourself  Remove any means that you might use to hurt yourself (examples: pills, rope, extension cords, firearm)  Get professional help from the community (Mental Health, Substance Abuse, psychological counseling)  Do not be alone:Call your Safe Contact- someone whom you trust who will be there for you.  Call your local CRISIS HOTLINE 356-7645 or [phone]  Call your local Children's Mobile Crisis Response Team [place] (068) 971-0908 or www.[employer]  Call the toll free National Suicide Prevention Hotlines   National Suicide Prevention Lifeline 544-718-RZAK (2589)  National Hope Line Network 800-SUICIDE (413-2072)    I acknowledge receipt and understanding of these Home Care instructions.

## 2023-01-10 NOTE — OR NURSING
Pt's VSS; denies N/V; states pain is at tolerable level. Dressing CDI to left chest. D/c orders received. IV dc'd. Pt changed into clothing with assistance. Discharge instructions given; pt and family verbalized understanding and questions answered. Patient states ready to d/c home. No prescriptions given, sent electronically to pt pharmacy. Pt dc'd in w/c with RN in stable condition.

## 2023-01-10 NOTE — OR NURSING
Patient recovered well in post-op. AAOx4. VSS, on RA. Pressure dressing removed per protocol, surgical dressing in place CDI. EKG, CXR, and interrogation completed. Declines pain. Patient tolerating fluids without nausea. Spouse updated and discussed POC. No belongings in pacu. Report called to GREG Colin. Patient transported to phase II.

## 2023-01-16 ENCOUNTER — NON-PROVIDER VISIT (OUTPATIENT)
Dept: CARDIOLOGY | Facility: MEDICAL CENTER | Age: 83
End: 2023-01-16

## 2023-01-16 ENCOUNTER — NON-PROVIDER VISIT (OUTPATIENT)
Dept: CARDIOLOGY | Facility: MEDICAL CENTER | Age: 83
End: 2023-01-16
Payer: MEDICARE

## 2023-01-16 DIAGNOSIS — Z95.810 AICD (AUTOMATIC CARDIOVERTER/DEFIBRILLATOR) PRESENT: ICD-10-CM

## 2023-01-16 DIAGNOSIS — I50.9 CONGESTIVE HEART FAILURE, UNSPECIFIED HF CHRONICITY, UNSPECIFIED HEART FAILURE TYPE (HCC): ICD-10-CM

## 2023-01-16 PROCEDURE — 93282 PRGRMG EVAL IMPLANTABLE DFB: CPT | Performed by: INTERNAL MEDICINE

## 2023-02-22 ENCOUNTER — OFFICE VISIT (OUTPATIENT)
Dept: CARDIOLOGY | Facility: MEDICAL CENTER | Age: 83
End: 2023-02-22
Payer: MEDICARE

## 2023-02-22 VITALS
WEIGHT: 192 LBS | BODY MASS INDEX: 26.01 KG/M2 | HEIGHT: 72 IN | OXYGEN SATURATION: 97 % | HEART RATE: 77 BPM | RESPIRATION RATE: 16 BRPM | SYSTOLIC BLOOD PRESSURE: 112 MMHG | DIASTOLIC BLOOD PRESSURE: 60 MMHG

## 2023-02-22 DIAGNOSIS — Z79.01 CHRONIC ANTICOAGULATION: ICD-10-CM

## 2023-02-22 DIAGNOSIS — Z95.0 CARDIAC PACEMAKER IN SITU: ICD-10-CM

## 2023-02-22 DIAGNOSIS — E78.2 MIXED HYPERLIPIDEMIA: ICD-10-CM

## 2023-02-22 DIAGNOSIS — I10 ESSENTIAL HYPERTENSION: ICD-10-CM

## 2023-02-22 DIAGNOSIS — E11.9 TYPE 2 DIABETES MELLITUS WITHOUT COMPLICATION, UNSPECIFIED WHETHER LONG TERM INSULIN USE (HCC): ICD-10-CM

## 2023-02-22 DIAGNOSIS — R00.1 BRADYCARDIA: ICD-10-CM

## 2023-02-22 DIAGNOSIS — I48.0 PAROXYSMAL ATRIAL FIBRILLATION (HCC): ICD-10-CM

## 2023-02-22 DIAGNOSIS — Z00.00 HEALTHCARE MAINTENANCE: ICD-10-CM

## 2023-02-22 PROCEDURE — 99214 OFFICE O/P EST MOD 30 MIN: CPT | Performed by: INTERNAL MEDICINE

## 2023-02-22 ASSESSMENT — ENCOUNTER SYMPTOMS
EYES NEGATIVE: 1
BRUISES/BLEEDS EASILY: 0
CHILLS: 0
HEMOPTYSIS: 0
SORE THROAT: 0
COUGH: 0
CLAUDICATION: 0
FEVER: 0
CONSTITUTIONAL NEGATIVE: 1
PND: 0
SHORTNESS OF BREATH: 0
RESPIRATORY NEGATIVE: 1
PALPITATIONS: 0
MUSCULOSKELETAL NEGATIVE: 1
STRIDOR: 0
CARDIOVASCULAR NEGATIVE: 1
LOSS OF CONSCIOUSNESS: 0
ORTHOPNEA: 0
SPUTUM PRODUCTION: 0
WEAKNESS: 0
DIZZINESS: 0
GASTROINTESTINAL NEGATIVE: 1
WHEEZING: 0
NEUROLOGICAL NEGATIVE: 1

## 2023-02-22 ASSESSMENT — FIBROSIS 4 INDEX: FIB4 SCORE: 1

## 2023-02-22 NOTE — PROGRESS NOTES
Chief Complaint   Patient presents with    Atrial Fibrillation     F/V Dx: Paroxysmal atrial fibrillation (HCC)    Congestive Heart Failure     F/V Dx: ACC/AHA stage C systolic heart failure (HCC)    Hypertension       Subjective     Nolberto Severino is a 82 y.o. male who presents today as a follow-up from a prior provider for history of heart failure pacemaker CAD hypertension hyperlipidemia.  Since he was last seen he is V paced 52% of the time.  His repeat echocardiogram showed an EF of 25%.  Is been on maximal tolerated medical therapy.  He said no chest pain or shortness of breath.    Since he was last seen he had his pacemaker upgraded to a CRT.  Most recent pacing was only a few percent.  He is feeling better with more energy.  His blood pressure is controlled.  Has not had a repeat echocardiogram.  He may be leaving for the [facility] in about 4 months.    Past Medical History:   Diagnosis Date    Adverse effect of anesthesia 2010    respiratory arrest 3x during surgery    Anesthesia     Bradycardia     Cataract     Chronic anticoagulation     Diabetes (HCC)     High cholesterol     Hypertension     Indigestion     Myocardial infarct ([facility]) 07/2022    Neuropathy     Pacemaker     Paroxysmal atrial fibrillation ([facility]) 06/2022    Echocardiogram with normal LV size, LVEF 60-65%. Normal RA, LA and RV. Mild AR, mild MR, moderate TR. RVSP 28mmHg.    Stroke (HCC)     Syncope      Past Surgical History:   Procedure Laterality Date    PACEMAKER INSERTION Left 02/08/2019    Medtronic Advisa SR MRI [id number] implanted in [place].    HIP REPLACEMENT, TOTAL Left     INTRAOCULAR LENS IMPLANT Bilateral      History reviewed. No pertinent family history.  Social History     Socioeconomic History    Marital status:      Spouse name: Not on file    Number of children: Not on file    Years of education: Not on file    Highest education level: Not on file   Occupational History    Not on file   Tobacco Use    Smoking status:  Former     Packs/day: 0.00     Years: 60.00     Pack years: 0.00     Types: Cigarettes, Pipe     Quit date: 2018     Years since quittin.1    Smokeless tobacco: Never   Vaping Use    Vaping Use: Never used   Substance and Sexual Activity    Alcohol use: Yes     Comment: rarely    Drug use: Never    Sexual activity: Not on file   Other Topics Concern    Not on file   Social History Narrative    Not on file     Social Determinants of Health     Financial Resource Strain: Not on file   Food Insecurity: Not on file   Transportation Needs: Not on file   Physical Activity: Not on file   Stress: Not on file   Social Connections: Not on file   Intimate Partner Violence: Not on file   Housing Stability: Not on file     No Known Allergies  Outpatient Encounter Medications as of 2023   Medication Sig Dispense Refill    apixaban (ELIQUIS) 5mg Tab Take 1 Tablet by mouth 2 times a day. 180 Tablet 3    carvedilol (COREG) 3.125 MG Tab Take 3.125 mg by mouth 2 times a day with meals.      furosemide (LASIX) 20 MG Tab Take 20 mg by mouth every 48 hours.      Insulin Degludec (TRESIBA FLEXTOUCH) 100 UNIT/ML Solution Pen-injector Inject 30 Units under the skin every evening.      sacubitril-valsartan (ENTRESTO) 24-26 MG Tab Take 1 Tablet by mouth 2 times a day. 90 Tablet 3    TRULICITY 1.5 MG/0.5ML Solution Pen-injector Inject 0.5 mL under the skin every Saturday. On Sat      atorvastatin (LIPITOR) 80 MG tablet Take 80 mg by mouth every evening.      clopidogrel (PLAVIX) 75 MG Tab Take 75 mg by mouth every day.      spironolactone (ALDACTONE) 25 MG Tab Take 25 mg by mouth every day.      gabapentin (NEURONTIN) 300 MG Cap Take 600 mg by mouth 3 times a day.      dapagliflozin propanediol (FARXIGA) 10 MG Tab Take 1 Tablet by mouth every day. 90 Tablet 3    metFORMIN (GLUCOPHAGE) 500 MG Tab Take 500 mg by mouth every evening.      [DISCONTINUED] cephALEXin (KEFLEX) 500 MG Cap Take 1 Capsule by mouth 3 times a day. (Patient not  taking: Reported on 2/22/2023) 15 Capsule 0    [DISCONTINUED] apixaban (ELIQUIS) 5mg Tab Take 1 Tablet by mouth 2 times a day. 180 Tablet 1     No facility-administered encounter medications on file as of 2/22/2023.     Review of Systems   Constitutional: Negative.  Negative for chills, fever and malaise/fatigue.   HENT: Negative.  Negative for sore throat.    Eyes: Negative.    Respiratory: Negative.  Negative for cough, hemoptysis, sputum production, shortness of breath, wheezing and stridor.    Cardiovascular: Negative.  Negative for chest pain, palpitations, orthopnea, claudication, leg swelling and PND.   Gastrointestinal: Negative.    Genitourinary: Negative.    Musculoskeletal: Negative.    Skin: Negative.    Neurological: Negative.  Negative for dizziness, loss of consciousness and weakness.   Endo/Heme/Allergies: Negative.  Does not bruise/bleed easily.   All other systems reviewed and are negative.           Objective     /60 (BP Location: Left arm, Patient Position: Sitting, BP Cuff Size: Adult)   Pulse 77   Resp 16   Ht 1.829 m (6')   Wt 87.1 kg (192 lb)   SpO2 97%   BMI 26.04 kg/m²     Physical Exam  Vitals and nursing note reviewed.   Constitutional:       General: He is not in acute distress.     Appearance: He is well-developed. He is not diaphoretic.   HENT:      Head: Normocephalic and atraumatic.      Right Ear: External ear normal.      Left Ear: External ear normal.      Nose: Nose normal.      Mouth/Throat:      Pharynx: No oropharyngeal exudate.   Eyes:      General: No scleral icterus.        Right eye: No discharge.         Left eye: No discharge.      Conjunctiva/sclera: Conjunctivae normal.      Pupils: Pupils are equal, round, and reactive to light.   Neck:      Vascular: No JVD.   Cardiovascular:      Rate and Rhythm: Normal rate and regular rhythm.      Heart sounds: No murmur heard.    No friction rub. No gallop.   Pulmonary:      Effort: Pulmonary effort is normal. No  respiratory distress.      Breath sounds: No stridor. No wheezing or rales.   Chest:      Chest wall: No tenderness.   Abdominal:      General: There is no distension.      Palpations: Abdomen is soft.      Tenderness: There is no guarding.   Musculoskeletal:         General: No tenderness or deformity. Normal range of motion.      Cervical back: Neck supple.   Skin:     General: Skin is warm and dry.      Coloration: Skin is not pale.      Findings: No erythema or rash.   Neurological:      Mental Status: He is alert.      Cranial Nerves: No cranial nerve deficit.      Motor: No abnormal muscle tone.      Coordination: Coordination normal.      Deep Tendon Reflexes: Reflexes are normal and symmetric. Reflexes normal.   Psychiatric:         Behavior: Behavior normal.         Thought Content: Thought content normal.         Judgment: Judgment normal.          Echocardiogram: Dated 11/18/2022 personally viewed inter myself showing an EF of 25%.    Assessment & Plan     1. Cardiac pacemaker in situ        2. Essential hypertension        3. Healthcare maintenance  EC-ECHOCARDIOGRAM COMPLETE W/O CONT      4. Bradycardia        5. Paroxysmal atrial fibrillation (HCC)  apixaban (ELIQUIS) 5mg Tab    EC-ECHOCARDIOGRAM COMPLETE W/O CONT      6. Chronic anticoagulation  EC-ECHOCARDIOGRAM COMPLETE W/O CONT      7. Type 2 diabetes mellitus without complication, unspecified whether long term insulin use (HCC)        8. Mixed hyperlipidemia            Medical Decision Making: Today's Assessment/Status/Plan:        82-year-old male with heart failure with reduced ejection fraction possibly from a combination of ischemic cardiomyopathy or chronic pacing.  He is now status post CRT and doing well.  We will keep him on the same medical therapy with no changes today.  I will see him back in 4 months with an echo prior.

## 2023-03-02 ENCOUNTER — NON-PROVIDER VISIT (OUTPATIENT)
Dept: CARDIOLOGY | Facility: PHYSICIAN GROUP | Age: 83
End: 2023-03-02
Payer: MEDICARE

## 2023-03-02 VITALS
OXYGEN SATURATION: 96 % | HEART RATE: 70 BPM | BODY MASS INDEX: 25.87 KG/M2 | SYSTOLIC BLOOD PRESSURE: 120 MMHG | RESPIRATION RATE: 18 BRPM | HEIGHT: 72 IN | WEIGHT: 191 LBS | DIASTOLIC BLOOD PRESSURE: 80 MMHG

## 2023-03-02 DIAGNOSIS — R00.1 BRADYCARDIA: ICD-10-CM

## 2023-03-02 DIAGNOSIS — Z79.01 CHRONIC ANTICOAGULATION: ICD-10-CM

## 2023-03-02 DIAGNOSIS — I50.20 HFREF (HEART FAILURE WITH REDUCED EJECTION FRACTION) (HCC): ICD-10-CM

## 2023-03-02 DIAGNOSIS — Z95.810 AICD (AUTOMATIC CARDIOVERTER/DEFIBRILLATOR) PRESENT: ICD-10-CM

## 2023-03-02 DIAGNOSIS — I48.0 PAROXYSMAL ATRIAL FIBRILLATION (HCC): ICD-10-CM

## 2023-03-02 PROCEDURE — 93282 PRGRMG EVAL IMPLANTABLE DFB: CPT | Performed by: NURSE PRACTITIONER

## 2023-03-02 ASSESSMENT — FIBROSIS 4 INDEX: FIB4 SCORE: 1

## 2023-03-02 NOTE — PROGRESS NOTES
Patient had upgrade from PM to AICD on 1/9/2023 by Dr. Clay. Original intent was to implant CRT-D, but unable to place LV lead.    He is here today for six week follow-up.    Device is working normally.  No device therapy.  He is RV paced 21.6% of total time. Normal sensing and capture of RV lead; stable impedances. Battery longevity is 12.7 years.  Changes today include decreasing RV output to 2.5V at 0.4ms.    Incision is well healed.    Follow-up in 3 months for next AICD check with me, as well as follow-up with Dr. Garner (to establish care - previous patient of Dr. Sommers).

## 2023-04-07 DIAGNOSIS — I48.0 PAROXYSMAL ATRIAL FIBRILLATION (HCC): ICD-10-CM

## 2023-04-07 DIAGNOSIS — Z79.01 CHRONIC ANTICOAGULATION: ICD-10-CM

## 2023-04-07 DIAGNOSIS — Z00.00 HEALTHCARE MAINTENANCE: ICD-10-CM

## 2023-06-15 ENCOUNTER — NON-PROVIDER VISIT (OUTPATIENT)
Dept: CARDIOLOGY | Facility: PHYSICIAN GROUP | Age: 83
End: 2023-06-15
Payer: MEDICARE

## 2023-06-15 ENCOUNTER — OFFICE VISIT (OUTPATIENT)
Dept: CARDIOLOGY | Facility: PHYSICIAN GROUP | Age: 83
End: 2023-06-15
Payer: MEDICARE

## 2023-06-15 VITALS
HEIGHT: 73 IN | RESPIRATION RATE: 12 BRPM | WEIGHT: 182 LBS | SYSTOLIC BLOOD PRESSURE: 90 MMHG | BODY MASS INDEX: 24.12 KG/M2 | OXYGEN SATURATION: 98 % | HEART RATE: 72 BPM | DIASTOLIC BLOOD PRESSURE: 58 MMHG

## 2023-06-15 VITALS
DIASTOLIC BLOOD PRESSURE: 58 MMHG | WEIGHT: 182 LBS | OXYGEN SATURATION: 98 % | RESPIRATION RATE: 12 BRPM | HEART RATE: 72 BPM | SYSTOLIC BLOOD PRESSURE: 90 MMHG | BODY MASS INDEX: 24.12 KG/M2 | HEIGHT: 73 IN

## 2023-06-15 DIAGNOSIS — E11.9 DIABETES MELLITUS TREATED WITH ORAL MEDICATION (HCC): ICD-10-CM

## 2023-06-15 DIAGNOSIS — Z79.84 DIABETES MELLITUS TREATED WITH ORAL MEDICATION (HCC): ICD-10-CM

## 2023-06-15 DIAGNOSIS — Z95.810 AICD (AUTOMATIC CARDIOVERTER/DEFIBRILLATOR) PRESENT: ICD-10-CM

## 2023-06-15 DIAGNOSIS — I50.20 HFREF (HEART FAILURE WITH REDUCED EJECTION FRACTION) (HCC): ICD-10-CM

## 2023-06-15 DIAGNOSIS — I10 ESSENTIAL HYPERTENSION: ICD-10-CM

## 2023-06-15 DIAGNOSIS — Z79.01 CHRONIC ANTICOAGULATION: ICD-10-CM

## 2023-06-15 DIAGNOSIS — I48.0 PAROXYSMAL ATRIAL FIBRILLATION (HCC): ICD-10-CM

## 2023-06-15 DIAGNOSIS — K92.2 GASTROINTESTINAL HEMORRHAGE, UNSPECIFIED GASTROINTESTINAL HEMORRHAGE TYPE: ICD-10-CM

## 2023-06-15 DIAGNOSIS — E78.2 MIXED HYPERLIPIDEMIA: ICD-10-CM

## 2023-06-15 PROCEDURE — 3078F DIAST BP <80 MM HG: CPT | Performed by: INTERNAL MEDICINE

## 2023-06-15 PROCEDURE — 93282 PRGRMG EVAL IMPLANTABLE DFB: CPT | Performed by: NURSE PRACTITIONER

## 2023-06-15 PROCEDURE — 3074F SYST BP LT 130 MM HG: CPT | Performed by: INTERNAL MEDICINE

## 2023-06-15 PROCEDURE — 99215 OFFICE O/P EST HI 40 MIN: CPT | Performed by: INTERNAL MEDICINE

## 2023-06-15 RX ORDER — INSULIN ASPART 100 [IU]/ML
10 INJECTION, SOLUTION INTRAVENOUS; SUBCUTANEOUS PRN
COMMUNITY

## 2023-06-15 RX ORDER — FUROSEMIDE 20 MG/1
20 TABLET ORAL
Qty: 30 TABLET | Refills: 3 | Status: ON HOLD | OUTPATIENT
Start: 2023-06-15 | End: 2024-03-09

## 2023-06-15 RX ORDER — FINASTERIDE 5 MG/1
5 TABLET, FILM COATED ORAL DAILY
COMMUNITY
Start: 2023-05-16

## 2023-06-15 RX ORDER — HYDROCHLOROTHIAZIDE 12.5 MG/1
CAPSULE, GELATIN COATED ORAL
COMMUNITY
Start: 2023-05-16 | End: 2023-06-15

## 2023-06-15 ASSESSMENT — ENCOUNTER SYMPTOMS
WEIGHT GAIN: 0
COUGH: 0
PALPITATIONS: 0
SYNCOPE: 0
IRREGULAR HEARTBEAT: 0
BACK PAIN: 0
ALTERED MENTAL STATUS: 0
CONSTIPATION: 0
DIZZINESS: 0
FEVER: 0
DECREASED APPETITE: 0
ABDOMINAL PAIN: 0
NEAR-SYNCOPE: 0
DYSPNEA ON EXERTION: 0
ORTHOPNEA: 0
NAUSEA: 0
VOMITING: 0
FLANK PAIN: 0
DEPRESSION: 0
PND: 0
SHORTNESS OF BREATH: 0
HEARTBURN: 0
CLAUDICATION: 0
WEIGHT LOSS: 0
BLURRED VISION: 0
DIARRHEA: 0

## 2023-06-15 ASSESSMENT — FIBROSIS 4 INDEX
FIB4 SCORE: 1
FIB4 SCORE: 1

## 2023-06-15 NOTE — PROGRESS NOTES
Cardiology Note    HF    History of Present Illness: Nolberto Severino is a 82 y.o. 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    [facility] admission 11/2020 for pneumonia, covid-19 positive.  [facility] ED 6/9/23 visit for rectal bleed. Noted resolved. Found stable h/h.    Doing well currently. No active cardiac complaints. Was started on HCTZ by PCP and stopped lasix. Borderline BP sometimes dizziness. No orthopnea, pnd. No syncope. No edema.     Review of Systems   Constitutional: Negative for decreased appetite, fever, malaise/fatigue, weight gain and weight loss.   HENT:  Negative for congestion and nosebleeds.    Eyes:  Negative for blurred vision.   Cardiovascular:  Negative for chest pain, claudication, dyspnea on exertion, irregular heartbeat, leg swelling, near-syncope, orthopnea, palpitations, paroxysmal nocturnal dyspnea and syncope.   Respiratory:  Negative for cough and shortness of breath.    Endocrine: Negative for cold intolerance and heat intolerance.   Skin:  Negative for rash.   Musculoskeletal:  Negative for back pain.   Gastrointestinal:  Negative for abdominal pain, constipation, diarrhea, heartburn, melena, nausea and vomiting.   Genitourinary:  Negative for dysuria, flank pain and hematuria.   Neurological:  Negative for dizziness.   Psychiatric/Behavioral:  Negative for altered mental status and depression.          Past Medical History:   Diagnosis Date    Adverse effect of anesthesia 2010    respiratory arrest 3x during surgery    Anesthesia     Bradycardia     Cataract     Chronic anticoagulation     Diabetes (HCC)     HFrEF (heart failure with reduced ejection fraction) ([facility]) 11/2022    Echocardiogram with normal LV size, mild concentric LVH, LVEF 25%. Mildly dilated LA and RV, enlarged RA. Moderate MR, mild TR. RVSP 20mmHg.    High cholesterol     Hypertension     Indigestion     Myocardial  infarct (HCC) 07/2022    Neuropathy     Paroxysmal atrial fibrillation (HCC)     Stroke (HCC)     Syncope          Past Surgical History:   Procedure Laterality Date    AICD IMPLANT Left 01/09/2023    Upgrade to Elías Hensley DR [id number] implanted by Dr. Clay.    PACEMAKER INSERTION Left 02/08/2019    Medtronic Advisa SR MRI [id number] implanted in [place].    HIP REPLACEMENT, TOTAL Left     INTRAOCULAR LENS IMPLANT Bilateral          Current Outpatient Medications   Medication Sig Dispense Refill    furosemide (LASIX) 20 MG Tab Take 1 Tablet by mouth 1 time a day as needed (for fluid retention). 30 Tablet 3    insulin aspart (NOVOLOG) 100 UNIT/ML Solution Inject  under the skin as needed for High Blood Sugar.      finasteride (PROSCAR) 5 MG Tab       apixaban (ELIQUIS) 5mg Tab Take 1 Tablet by mouth 2 times a day. 180 Tablet 3    carvedilol (COREG) 3.125 MG Tab Take 3.125 mg by mouth 2 times a day with meals.      Insulin Degludec (TRESIBA FLEXTOUCH) 100 UNIT/ML Solution Pen-injector Inject 30 Units under the skin every evening.      sacubitril-valsartan (ENTRESTO) 24-26 MG Tab Take 1 Tablet by mouth 2 times a day. 90 Tablet 3    TRULICITY 1.5 MG/0.5ML Solution Pen-injector Inject 0.5 mL under the skin every Saturday. On Sat      atorvastatin (LIPITOR) 80 MG tablet Take 80 mg by mouth every evening.      clopidogrel (PLAVIX) 75 MG Tab Take 75 mg by mouth every day.      spironolactone (ALDACTONE) 25 MG Tab Take 25 mg by mouth every day.      gabapentin (NEURONTIN) 300 MG Cap Take 600 mg by mouth 3 times a day.      dapagliflozin propanediol (FARXIGA) 10 MG Tab Take 1 Tablet by mouth every day. 90 Tablet 3    metFORMIN (GLUCOPHAGE) 500 MG Tab Take 500 mg by mouth every evening.       No current facility-administered medications for this visit.         No Known Allergies      No family history on file.      Social History     Socioeconomic History    Marital status:      Spouse name: Not on file    Number of children:  "Not on file    Years of education: Not on file    Highest education level: Not on file   Occupational History    Not on file   Tobacco Use    Smoking status: Former     Packs/day: 0.00     Years: 60.00     Pack years: 0.00     Types: Cigarettes, Pipe     Quit date:      Years since quittin.4    Smokeless tobacco: Never   Vaping Use    Vaping Use: Never used   Substance and Sexual Activity    Alcohol use: Yes     Comment: rarely    Drug use: Never    Sexual activity: Not on file   Other Topics Concern    Not on file   Social History Narrative    Not on file     Social Determinants of Health     Financial Resource Strain: Not on file   Food Insecurity: Not on file   Transportation Needs: Not on file   Physical Activity: Not on file   Stress: Not on file   Social Connections: Not on file   Intimate Partner Violence: Not on file   Housing Stability: Not on file         Physical Exam:  Ambulatory Vitals  BP 90/58 (BP Location: Left arm, Patient Position: Sitting, BP Cuff Size: Adult)   Pulse 72   Resp 12   Ht 1.854 m (6' 1\")   Wt 82.6 kg (182 lb)   SpO2 98%    BP Readings from Last 4 Encounters:   06/15/23 90/58   06/15/23 90/58   23 120/80   23 112/60     Weight/BMI:   Vitals:    06/15/23 1051   BP: 90/58   Weight: 82.6 kg (182 lb)   Height: 1.854 m (6' 1\")    Body mass index is 24.01 kg/m².  Wt Readings from Last 4 Encounters:   06/15/23 82.6 kg (182 lb)   06/15/23 82.6 kg (182 lb)   23 86.6 kg (191 lb)   23 87.1 kg (192 lb)       Physical Exam  Constitutional:       General: He is not in acute distress.  HENT:      Head: Normocephalic and atraumatic.   Eyes:      Conjunctiva/sclera: Conjunctivae normal.      Pupils: Pupils are equal, round, and reactive to light.   Neck:      Vascular: No JVD.   Cardiovascular:      Rate and Rhythm: Normal rate and regular rhythm.      Heart sounds: Normal heart sounds. No murmur heard.     No friction rub. No gallop.   Pulmonary:      Effort: " Pulmonary effort is normal. No respiratory distress.      Breath sounds: Normal breath sounds. No wheezing or rales.   Chest:      Chest wall: No tenderness.   Abdominal:      General: Bowel sounds are normal. There is no distension.      Palpations: Abdomen is soft.   Musculoskeletal:      Cervical back: Normal range of motion and neck supple.   Skin:     General: Skin is warm and dry.   Neurological:      Mental Status: He is alert and oriented to person, place, and time.   Psychiatric:         Mood and Affect: Affect normal.         Judgment: Judgment normal.         Lab Data Review:  No results found for: CHOLSTRLTOT, LDL, HDL, TRIGLYCERIDE    Lab Results   Component Value Date/Time    SODIUM 142 01/09/2023 08:39 AM    POTASSIUM 4.3 01/09/2023 08:39 AM    CHLORIDE 108 01/09/2023 08:39 AM    CO2 22 01/09/2023 08:39 AM    GLUCOSE 121 (H) 01/09/2023 08:39 AM    BUN 26 (H) 01/09/2023 08:39 AM    CREATININE 1.22 01/09/2023 08:39 AM     CrCl cannot be calculated (Patient's most recent lab result is older than the maximum 7 days allowed.).  Lab Results   Component Value Date/Time    ALKPHOSPHAT 63 01/09/2023 08:39 AM    ASTSGOT 15 01/09/2023 08:39 AM    ALTSGPT 17 01/09/2023 08:39 AM    TBILIRUBIN 1.2 01/09/2023 08:39 AM      Lab Results   Component Value Date/Time    WBC 7.2 01/09/2023 08:39 AM     No results found for: HBA1C  No components found for: TROP    10/2019  a1c 8.0    Outside labs 1/2020  , HDL 37, trip 126, tot 198  Microalb/cr 1.86 wnl    Cardiac Imaging and Procedures Review:      EKG 3/16/20 reviewed by me AF, CP  outside EKG 12/2019 - AF,     Outside TTE 2/2019  -LVEF 55%  -mild AR  -mild MR  -mild TR  -RVSP 24 mmHg  -right atrium and left atrium dilated    Bcc cbc 6/9/23  -hgb 14    Medical Decision Making:  Problem List Items Addressed This Visit       Essential hypertension    Relevant Medications    furosemide (LASIX) 20 MG Tab    Paroxysmal atrial fibrillation (HCC)    Relevant  Medications    furosemide (LASIX) 20 MG Tab    Mixed hyperlipidemia    Relevant Medications    furosemide (LASIX) 20 MG Tab    AICD (automatic cardioverter/defibrillator) present    HFrEF (heart failure with reduced ejection fraction) (HCC)    Relevant Medications    furosemide (LASIX) 20 MG Tab    Other Relevant Orders    Basic Metabolic Panel    proBrain Natriuretic Peptide, NT    Lipid Profile    CBC WITHOUT DIFFERENTIAL    URINALYSIS (Routine/Complete)    URINE CULTURE(NEW)    Diabetes mellitus treated with oral medication (HCC)    Relevant Orders    URINE CULTURE(NEW)    Gastrointestinal hemorrhage    Relevant Orders    Referral to Gastroenterology     HFrEF stg C NYHA II - continue HFOMT. Reduce lasix to prn. D/c hctz. Repeat labs including urine studies.    GIB now stable - refer to GI urgently. Serial cbc.     Persistent AF - chadsvasc 4 - eliquis 5mg bid for cva prevention. Rate control.    HTN - goal 120/80. Chronic. Controlled. Continue current regimen.    10 year risk ascvd high / DM - high intensity statin. Annual lipids.    It was my pleasure to meet with Mr. Severino.    A total of 65 minutes of time was spent on day of encounter reviewing medical record, performing history and examination, counseling, ordering medication/test/consults and documentation.

## 2023-06-15 NOTE — PROGRESS NOTES
Patient was upgraded from PM to AICD (unable to implant LV lead for CRT-D device) on 1/9/2023.    Device is working normally.  No device therapy.  Normal sensing and capture of RV lead; stable impedances. Battery longevity is 11.8 years.  No changes are made today.    He does see Dr. Garner today, to establish care (previous patient of Dr. Sommers).    Follow-up in 6 months for next AICD check with me.

## 2023-06-15 NOTE — PATIENT INSTRUCTIONS
Stop hydrochlorothiazide  Change lasix to only as needed for fluid retention.  Repeat blood work in two weeks.

## 2023-07-10 ENCOUNTER — NON-PROVIDER VISIT (OUTPATIENT)
Dept: CARDIOLOGY | Facility: MEDICAL CENTER | Age: 83
End: 2023-07-10
Payer: MEDICARE

## 2023-07-10 PROCEDURE — 93295 DEV INTERROG REMOTE 1/2/MLT: CPT | Performed by: INTERNAL MEDICINE

## 2023-07-10 NOTE — CARDIAC REMOTE MONITOR - SCAN
Device transmission reviewed. Device demonstrated appropriate function.       Electronically Signed by: Luis Miguel Julian M.D.    7/14/2023  12:19 PM

## 2023-07-12 LAB
CHOLEST SERPL-MCNC: 102 MG/DL (ref ?–199)
HDLC SERPL-MCNC: 36 MG/DL (ref 40–?)
LDLC SERPL CALC-MCNC: 51 MG/DL (ref ?–99)
TRIGL SERPL-MCNC: 69 MG/DL (ref ?–149)

## 2023-07-13 DIAGNOSIS — I50.20 HFREF (HEART FAILURE WITH REDUCED EJECTION FRACTION) (HCC): ICD-10-CM

## 2023-07-20 DIAGNOSIS — Z79.84 DIABETES MELLITUS TREATED WITH ORAL MEDICATION (HCC): ICD-10-CM

## 2023-07-20 DIAGNOSIS — E11.9 DIABETES MELLITUS TREATED WITH ORAL MEDICATION (HCC): ICD-10-CM

## 2023-07-20 DIAGNOSIS — I50.20 HFREF (HEART FAILURE WITH REDUCED EJECTION FRACTION) (HCC): ICD-10-CM

## 2023-10-25 ENCOUNTER — NON-PROVIDER VISIT (OUTPATIENT)
Dept: CARDIOLOGY | Facility: MEDICAL CENTER | Age: 83
End: 2023-10-25
Payer: MEDICARE

## 2023-10-25 PROCEDURE — 93295 DEV INTERROG REMOTE 1/2/MLT: CPT | Performed by: INTERNAL MEDICINE

## 2023-10-25 NOTE — CARDIAC REMOTE MONITOR - SCAN
Device transmission reviewed. Device demonstrated appropriate function.       Electronically Signed by: Hipolito Clay M.D.    11/8/2023  9:44 PM

## 2023-12-18 ENCOUNTER — OFFICE VISIT (OUTPATIENT)
Dept: CARDIOLOGY | Facility: PHYSICIAN GROUP | Age: 83
End: 2023-12-18
Payer: MEDICARE

## 2023-12-18 VITALS
DIASTOLIC BLOOD PRESSURE: 56 MMHG | SYSTOLIC BLOOD PRESSURE: 118 MMHG | WEIGHT: 172 LBS | HEART RATE: 78 BPM | HEIGHT: 73 IN | RESPIRATION RATE: 15 BRPM | OXYGEN SATURATION: 97 % | BODY MASS INDEX: 22.8 KG/M2

## 2023-12-18 DIAGNOSIS — I50.20 HFREF (HEART FAILURE WITH REDUCED EJECTION FRACTION) (HCC): ICD-10-CM

## 2023-12-18 DIAGNOSIS — I48.0 PAROXYSMAL ATRIAL FIBRILLATION (HCC): ICD-10-CM

## 2023-12-18 DIAGNOSIS — K92.2 GASTROINTESTINAL HEMORRHAGE, UNSPECIFIED GASTROINTESTINAL HEMORRHAGE TYPE: ICD-10-CM

## 2023-12-18 DIAGNOSIS — E78.2 MIXED HYPERLIPIDEMIA: ICD-10-CM

## 2023-12-18 DIAGNOSIS — Z95.810 AICD (AUTOMATIC CARDIOVERTER/DEFIBRILLATOR) PRESENT: ICD-10-CM

## 2023-12-18 DIAGNOSIS — I10 ESSENTIAL HYPERTENSION: ICD-10-CM

## 2023-12-18 PROCEDURE — 3074F SYST BP LT 130 MM HG: CPT | Performed by: INTERNAL MEDICINE

## 2023-12-18 PROCEDURE — 3078F DIAST BP <80 MM HG: CPT | Performed by: INTERNAL MEDICINE

## 2023-12-18 PROCEDURE — 99215 OFFICE O/P EST HI 40 MIN: CPT | Performed by: INTERNAL MEDICINE

## 2023-12-18 RX ORDER — CARVEDILOL 6.25 MG/1
6.25 TABLET ORAL 2 TIMES DAILY WITH MEALS
Qty: 8 TABLET | Refills: 3 | Status: SHIPPED | OUTPATIENT
Start: 2023-12-18 | End: 2024-01-02

## 2023-12-18 ASSESSMENT — ENCOUNTER SYMPTOMS
DECREASED APPETITE: 0
CLAUDICATION: 0
FLANK PAIN: 0
HEARTBURN: 0
ABDOMINAL PAIN: 0
BLURRED VISION: 0
BACK PAIN: 0
PALPITATIONS: 0
DIZZINESS: 0
VOMITING: 0
IRREGULAR HEARTBEAT: 0
WEIGHT LOSS: 0
DYSPNEA ON EXERTION: 0
DEPRESSION: 0
NEAR-SYNCOPE: 0
FEVER: 0
ALTERED MENTAL STATUS: 0
WEIGHT GAIN: 0
ORTHOPNEA: 0
DIARRHEA: 0
COUGH: 0
PND: 0
CONSTIPATION: 0
NAUSEA: 0
SHORTNESS OF BREATH: 0
SYNCOPE: 0

## 2023-12-18 ASSESSMENT — FIBROSIS 4 INDEX: FIB4 SCORE: 1.01

## 2023-12-18 NOTE — PROGRESS NOTES
Cardiology Note    HF    History of Present Illness: Nolberto Severino is a 83 y.o. 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    [facility] admission 11/2020 for pneumonia, covid-19 positive.  [facility] ED 6/9/23 visit for rectal bleed. Noted resolved. Found stable h/h.    No active cardiac complaints especially no heart failure symptoms. Compliant with medications and denies adverse effects. Visited [facility] with wife and describes hemorhoids s/p resection. Didn't have the chance to see GI locally. He had echo dont in [facility]. [facility] PCP has copy.     Review of Systems   Constitutional: Negative for decreased appetite, fever, malaise/fatigue, weight gain and weight loss.   HENT:  Negative for congestion and nosebleeds.    Eyes:  Negative for blurred vision.   Cardiovascular:  Negative for chest pain, claudication, dyspnea on exertion, irregular heartbeat, leg swelling, near-syncope, orthopnea, palpitations, paroxysmal nocturnal dyspnea and syncope.   Respiratory:  Negative for cough and shortness of breath.    Endocrine: Negative for cold intolerance and heat intolerance.   Skin:  Negative for rash.   Musculoskeletal:  Negative for back pain.   Gastrointestinal:  Negative for abdominal pain, constipation, diarrhea, heartburn, melena, nausea and vomiting.   Genitourinary:  Negative for dysuria, flank pain and hematuria.   Neurological:  Negative for dizziness.   Psychiatric/Behavioral:  Negative for altered mental status and depression.          Past Medical History:   Diagnosis Date    Adverse effect of anesthesia 2010    respiratory arrest 3x during surgery    Anesthesia     Bradycardia     Cataract     Chronic anticoagulation     Diabetes (HCC)     HFrEF (heart failure with reduced ejection fraction) ([facility]) 11/2022    Echocardiogram with normal LV size, mild concentric LVH, LVEF 25%. Mildly dilated LA and RV,  enlarged RA. Moderate MR, mild TR. RVSP 20mmHg.    High cholesterol     Hypertension     Indigestion     Myocardial infarct (HCC) 07/2022    Neuropathy     Paroxysmal atrial fibrillation (HCC)     Stroke (HCC)     Syncope          Past Surgical History:   Procedure Laterality Date    AICD IMPLANT Left 01/09/2023    Upgrade to Medjennifer Hensley DR [id number] implanted by Dr. Clay.    PACEMAKER INSERTION Left 02/08/2019    Medtronic Advisa SR MRI [id number] implanted in [place].    HIP REPLACEMENT, TOTAL Left     INTRAOCULAR LENS IMPLANT Bilateral          Current Outpatient Medications   Medication Sig Dispense Refill    carvedilol (COREG) 6.25 MG Tab Take 1 Tablet by mouth 2 times a day with meals. 8 Tablet 3    insulin aspart (NOVOLOG) 100 UNIT/ML Solution Inject  under the skin as needed for High Blood Sugar.      finasteride (PROSCAR) 5 MG Tab       furosemide (LASIX) 20 MG Tab Take 1 Tablet by mouth 1 time a day as needed (for fluid retention). 30 Tablet 3    apixaban (ELIQUIS) 5mg Tab Take 1 Tablet by mouth 2 times a day. 180 Tablet 3    Insulin Degludec (TRESIBA FLEXTOUCH) 100 UNIT/ML Solution Pen-injector Inject 30 Units under the skin every evening.      sacubitril-valsartan (ENTRESTO) 24-26 MG Tab Take 1 Tablet by mouth 2 times a day. 90 Tablet 3    TRULICITY 1.5 MG/0.5ML Solution Pen-injector Inject 0.5 mL under the skin every Saturday. On Sat      atorvastatin (LIPITOR) 80 MG tablet Take 80 mg by mouth every evening.      clopidogrel (PLAVIX) 75 MG Tab Take 75 mg by mouth every day.      spironolactone (ALDACTONE) 25 MG Tab Take 25 mg by mouth every day.      gabapentin (NEURONTIN) 300 MG Cap Take 600 mg by mouth 3 times a day.      dapagliflozin propanediol (FARXIGA) 10 MG Tab Take 1 Tablet by mouth every day. 90 Tablet 3    metFORMIN (GLUCOPHAGE) 500 MG Tab Take 500 mg by mouth every evening.       No current facility-administered medications for this visit.         No Known Allergies      History reviewed. No  "pertinent family history.      Social History     Socioeconomic History    Marital status:      Spouse name: Not on file    Number of children: Not on file    Years of education: Not on file    Highest education level: Not on file   Occupational History    Not on file   Tobacco Use    Smoking status: Former     Current packs/day: 0.00     Types: Cigarettes, Pipe     Quit date:      Years since quittin.0    Smokeless tobacco: Never   Vaping Use    Vaping Use: Never used   Substance and Sexual Activity    Alcohol use: Yes     Comment: rarely    Drug use: Never    Sexual activity: Not on file   Other Topics Concern    Not on file   Social History Narrative    Not on file     Social Determinants of Health     Financial Resource Strain: Not on file   Food Insecurity: Not on file   Transportation Needs: Not on file   Physical Activity: Not on file   Stress: Not on file   Social Connections: Not on file   Intimate Partner Violence: Not on file   Housing Stability: Not on file         Physical Exam:  Ambulatory Vitals  /56 (BP Location: Left arm, Patient Position: Sitting, BP Cuff Size: Adult)   Pulse 78   Resp 15   Ht 1.854 m (6' 1\")   Wt 78 kg (172 lb)   SpO2 97%    BP Readings from Last 4 Encounters:   23 118/56   06/15/23 90/58   06/15/23 90/58   23 120/80     Weight/BMI:   Vitals:    23 1401   BP: 118/56   Weight: 78 kg (172 lb)   Height: 1.854 m (6' 1\")    Body mass index is 22.69 kg/m².  Wt Readings from Last 4 Encounters:   23 78 kg (172 lb)   06/15/23 82.6 kg (182 lb)   06/15/23 82.6 kg (182 lb)   23 86.6 kg (191 lb)       Physical Exam  Constitutional:       General: He is not in acute distress.  HENT:      Head: Normocephalic and atraumatic.   Eyes:      Conjunctiva/sclera: Conjunctivae normal.      Pupils: Pupils are equal, round, and reactive to light.   Neck:      Vascular: No JVD.   Cardiovascular:      Rate and Rhythm: Normal rate and regular rhythm. " "     Heart sounds: Normal heart sounds. No murmur heard.     No friction rub. No gallop.   Pulmonary:      Effort: Pulmonary effort is normal. No respiratory distress.      Breath sounds: Normal breath sounds. No wheezing or rales.   Chest:      Chest wall: No tenderness.   Abdominal:      General: Bowel sounds are normal. There is no distension.      Palpations: Abdomen is soft.   Musculoskeletal:      Cervical back: Normal range of motion and neck supple.   Skin:     General: Skin is warm and dry.   Neurological:      Mental Status: He is alert and oriented to person, place, and time.   Psychiatric:         Mood and Affect: Affect normal.         Judgment: Judgment normal.         Lab Data Review:  Lab Results   Component Value Date/Time    CHOLSTRLTOT 102 07/12/2023 12:00 AM    LDL 51 07/12/2023 12:00 AM    HDL 36 (A) 07/12/2023 12:00 AM    TRIGLYCERIDE 69 07/12/2023 12:00 AM       Lab Results   Component Value Date/Time    SODIUM 142 01/09/2023 08:39 AM    POTASSIUM 4.3 01/09/2023 08:39 AM    CHLORIDE 108 01/09/2023 08:39 AM    CO2 22 01/09/2023 08:39 AM    GLUCOSE 121 (H) 01/09/2023 08:39 AM    BUN 26 (H) 01/09/2023 08:39 AM    CREATININE 1.22 01/09/2023 08:39 AM     CrCl cannot be calculated (Patient's most recent lab result is older than the maximum 7 days allowed.).  Lab Results   Component Value Date/Time    ALKPHOSPHAT 63 01/09/2023 08:39 AM    ASTSGOT 15 01/09/2023 08:39 AM    ALTSGPT 17 01/09/2023 08:39 AM    TBILIRUBIN 1.2 01/09/2023 08:39 AM      Lab Results   Component Value Date/Time    WBC 7.2 01/09/2023 08:39 AM     No results found for: \"HBA1C\"  No components found for: \"TROP\"    10/2019  a1c 8.0    Outside labs 1/2020  , HDL 37, trip 126, tot 198  Microalb/cr 1.86 wnl    Cardiac Imaging and Procedures Review:      EKG 3/16/20 reviewed by me AF, CP  outside EKG 12/2019 - AFTHERESE    Outside TTE 2/2019  -LVEF 55%  -mild AR  -mild MR  -mild TR  -RVSP 24 mmHg  -right atrium and left atrium " dilated    [facility] cbc 6/9/23  -hgb 14    TTE 11/2022  CONCLUSIONS  No prior study is available for comparison.   Reduced left ventricular systolic function. The left ventricular   ejection fraction is visually estimated to be 25%. Inferolateral walls   akinetic.  Moderate mitral regurgitation.  Mild aortic insufficiency.  Estimated right ventricular systolic pressure is 20 mmHg.  Normal aortic root for body surface area. The ascending aorta diameter   is 3.1 cm.    TTE 4/2023 [facility]  -LVEF 25-30%, reduced RVF, mild LAE, mild AR, mild MR, mild to mod TR    Medical Decision Making:  Problem List Items Addressed This Visit       Essential hypertension    Relevant Medications    carvedilol (COREG) 6.25 MG Tab    Paroxysmal atrial fibrillation (HCC)    Relevant Medications    carvedilol (COREG) 6.25 MG Tab    Other Relevant Orders    NM-CARDIAC STRESS TEST    Referral to Gastroenterology    Mixed hyperlipidemia    Relevant Medications    carvedilol (COREG) 6.25 MG Tab    AICD (automatic cardioverter/defibrillator) present    HFrEF (heart failure with reduced ejection fraction) (HCC)    Relevant Medications    carvedilol (COREG) 6.25 MG Tab    Other Relevant Orders    NM-CARDIAC STRESS TEST    Gastrointestinal hemorrhage     Obtain outside records.    HFrEF stg C NYHA II - continue HFOMT. Titrate carvedilol. Mpi spect stress.    GIB now stable - refer to GI.     Persistent AF - chadsvasc 4 - eliquis 5mg bid for cva prevention. Rate control.    HTN - goal <130/80. Chronic. Controlled. Continue current regimen.    10 year risk ascvd high / DM - high intensity statin. Annual lipids with PCP.    It was my pleasure to meet with Mr. Severino.    A total of 65 minutes of time was spent on day of encounter reviewing medical record, performing history and examination, counseling, ordering medication/test/consults and documentation.

## 2023-12-21 ENCOUNTER — NON-PROVIDER VISIT (OUTPATIENT)
Dept: CARDIOLOGY | Facility: PHYSICIAN GROUP | Age: 83
End: 2023-12-21
Payer: MEDICARE

## 2023-12-21 VITALS
RESPIRATION RATE: 18 BRPM | WEIGHT: 172 LBS | BODY MASS INDEX: 22.8 KG/M2 | DIASTOLIC BLOOD PRESSURE: 52 MMHG | SYSTOLIC BLOOD PRESSURE: 112 MMHG | HEART RATE: 92 BPM | OXYGEN SATURATION: 97 % | HEIGHT: 73 IN

## 2023-12-21 DIAGNOSIS — Z95.810 AICD (AUTOMATIC CARDIOVERTER/DEFIBRILLATOR) PRESENT: ICD-10-CM

## 2023-12-21 DIAGNOSIS — I50.20 HFREF (HEART FAILURE WITH REDUCED EJECTION FRACTION) (HCC): ICD-10-CM

## 2023-12-21 DIAGNOSIS — Z79.01 CHRONIC ANTICOAGULATION: ICD-10-CM

## 2023-12-21 DIAGNOSIS — I48.0 PAROXYSMAL ATRIAL FIBRILLATION (HCC): ICD-10-CM

## 2023-12-21 PROCEDURE — 93289 INTERROG DEVICE EVAL HEART: CPT | Mod: 26 | Performed by: NURSE PRACTITIONER

## 2023-12-21 ASSESSMENT — FIBROSIS 4 INDEX: FIB4 SCORE: 1.01

## 2023-12-21 NOTE — PROGRESS NOTES
Patient did just see Dr. Garner, who titrated his Coreg to 6.25mg twice daily, and ordered an MPI.    Device is working normally.  No device therapy.  Normal sensing and capture of RV lead; stable impedances. Battery longevity is 11.3 years.  No changes are made today.    Follow-up in 6 months for next AICD check with me.

## 2023-12-23 DIAGNOSIS — I50.20 HFREF (HEART FAILURE WITH REDUCED EJECTION FRACTION) (HCC): ICD-10-CM

## 2023-12-26 NOTE — TELEPHONE ENCOUNTER
Is the patient due for a refill? Yes    Was the patient seen the past year? Yes    Date of last office visit: 12/18/23     Does the patient have an upcoming appointment?  Yes   If yes, When? 6/6/24     Provider to refill:VR    Does the patients insurance require a 100 day supply?  No

## 2024-01-02 RX ORDER — CARVEDILOL 6.25 MG/1
6.25 TABLET ORAL 2 TIMES DAILY WITH MEALS
Qty: 180 TABLET | Refills: 3 | Status: ON HOLD | OUTPATIENT
Start: 2024-01-02 | End: 2024-03-09

## 2024-01-23 DIAGNOSIS — I50.20 HFREF (HEART FAILURE WITH REDUCED EJECTION FRACTION) (HCC): ICD-10-CM

## 2024-01-23 DIAGNOSIS — I48.0 PAROXYSMAL ATRIAL FIBRILLATION (HCC): ICD-10-CM

## 2024-01-24 ENCOUNTER — NON-PROVIDER VISIT (OUTPATIENT)
Dept: CARDIOLOGY | Facility: MEDICAL CENTER | Age: 84
End: 2024-01-24
Payer: MEDICARE

## 2024-01-24 PROCEDURE — 93295 DEV INTERROG REMOTE 1/2/MLT: CPT | Performed by: INTERNAL MEDICINE

## 2024-01-25 NOTE — CARDIAC REMOTE MONITOR - SCAN
Device transmission reviewed. Device demonstrated appropriate function.       Electronically Signed by: Hipolito Clay M.D.    2/6/2024  10:57 AM

## 2024-03-03 ENCOUNTER — HOSPITAL ENCOUNTER (OUTPATIENT)
Dept: RADIOLOGY | Facility: MEDICAL CENTER | Age: 84
End: 2024-03-03
Payer: MEDICARE

## 2024-03-04 ENCOUNTER — ANESTHESIA (OUTPATIENT)
Dept: SURGERY | Facility: MEDICAL CENTER | Age: 84
DRG: 493 | End: 2024-03-04
Payer: MEDICARE

## 2024-03-04 ENCOUNTER — APPOINTMENT (OUTPATIENT)
Dept: RADIOLOGY | Facility: MEDICAL CENTER | Age: 84
DRG: 493 | End: 2024-03-04
Attending: STUDENT IN AN ORGANIZED HEALTH CARE EDUCATION/TRAINING PROGRAM
Payer: MEDICARE

## 2024-03-04 ENCOUNTER — HOSPITAL ENCOUNTER (OUTPATIENT)
Dept: RADIOLOGY | Facility: MEDICAL CENTER | Age: 84
End: 2024-03-04

## 2024-03-04 ENCOUNTER — HOSPITAL ENCOUNTER (INPATIENT)
Facility: MEDICAL CENTER | Age: 84
LOS: 5 days | DRG: 493 | End: 2024-03-09
Attending: EMERGENCY MEDICINE | Admitting: STUDENT IN AN ORGANIZED HEALTH CARE EDUCATION/TRAINING PROGRAM
Payer: MEDICARE

## 2024-03-04 ENCOUNTER — ANESTHESIA EVENT (OUTPATIENT)
Dept: SURGERY | Facility: MEDICAL CENTER | Age: 84
DRG: 493 | End: 2024-03-04
Payer: MEDICARE

## 2024-03-04 ENCOUNTER — APPOINTMENT (OUTPATIENT)
Dept: CARDIOLOGY | Facility: MEDICAL CENTER | Age: 84
DRG: 493 | End: 2024-03-04
Attending: STUDENT IN AN ORGANIZED HEALTH CARE EDUCATION/TRAINING PROGRAM
Payer: MEDICARE

## 2024-03-04 DIAGNOSIS — S52.532A CLOSED COLLES' FRACTURE OF LEFT RADIUS, INITIAL ENCOUNTER: ICD-10-CM

## 2024-03-04 DIAGNOSIS — I50.20 HFREF (HEART FAILURE WITH REDUCED EJECTION FRACTION) (HCC): ICD-10-CM

## 2024-03-04 DIAGNOSIS — S42.352A CLOSED DISPLACED COMMINUTED FRACTURE OF SHAFT OF LEFT HUMERUS, INITIAL ENCOUNTER: ICD-10-CM
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
## 2024-03-04 PROBLEM — I48.91 ATRIAL FIBRILLATION WITH RVR (HCC): Status: ACTIVE | Noted: 2024-03-04

## 2024-03-04 PROBLEM — S42.412B: Status: ACTIVE | Noted: 2024-03-04

## 2024-03-04 PROBLEM — I95.2 HYPOTENSION DUE TO DRUGS: Status: ACTIVE | Noted: 2024-03-04

## 2024-03-04 PROBLEM — D64.9 ANEMIA: Status: ACTIVE | Noted: 2024-03-04

## 2024-03-04 PROBLEM — S42.412A LEFT SUPRACONDYLAR HUMERUS FRACTURE, CLOSED, INITIAL ENCOUNTER: Status: ACTIVE | Noted: 2024-03-04

## 2024-03-04 PROBLEM — S52.90XA FRACTURE OF RADIUS: Status: ACTIVE | Noted: 2024-03-04

## 2024-03-04 PROBLEM — I50.22 CHRONIC SYSTOLIC CONGESTIVE HEART FAILURE (HCC): Status: ACTIVE | Noted: 2023-03-02

## 2024-03-04 PROBLEM — Z01.818 ENCOUNTER FOR PREOPERATIVE ASSESSMENT: Status: ACTIVE | Noted: 2024-03-04

## 2024-03-04 LAB
ABO + RH BLD: NORMAL
ABO GROUP BLD: NORMAL
ALBUMIN SERPL BCP-MCNC: 3.4 G/DL (ref 3.2–4.9)
ALBUMIN/GLOB SERPL: 1.8 G/DL
ALP SERPL-CCNC: 50 U/L (ref 30–99)
ALT SERPL-CCNC: 19 U/L (ref 2–50)
ANION GAP SERPL CALC-SCNC: 11 MMOL/L (ref 7–16)
APTT PPP: 30.9 SEC (ref 24.7–36)
AST SERPL-CCNC: 20 U/L (ref 12–45)
BILIRUB SERPL-MCNC: 1 MG/DL (ref 0.1–1.5)
BLD GP AB SCN SERPL QL: NORMAL
BUN SERPL-MCNC: 22 MG/DL (ref 8–22)
CALCIUM ALBUM COR SERPL-MCNC: 8.3 MG/DL (ref 8.5–10.5)
CALCIUM SERPL-MCNC: 7.8 MG/DL (ref 8.5–10.5)
CHLORIDE SERPL-SCNC: 108 MMOL/L (ref 96–112)
CO2 SERPL-SCNC: 20 MMOL/L (ref 20–33)
CREAT SERPL-MCNC: 1.1 MG/DL (ref 0.5–1.4)
EKG IMPRESSION: NORMAL
ERYTHROCYTE [DISTWIDTH] IN BLOOD BY AUTOMATED COUNT: 59.7 FL (ref 35.9–50)
ETHANOL BLD-MCNC: <10.1 MG/DL
GFR SERPLBLD CREATININE-BSD FMLA CKD-EPI: 67 ML/MIN/1.73 M 2
GLOBULIN SER CALC-MCNC: 1.9 G/DL (ref 1.9–3.5)
GLUCOSE BLD STRIP.AUTO-MCNC: 143 MG/DL (ref 65–99)
GLUCOSE BLD STRIP.AUTO-MCNC: 154 MG/DL (ref 65–99)
GLUCOSE BLD STRIP.AUTO-MCNC: 175 MG/DL (ref 65–99)
GLUCOSE BLD STRIP.AUTO-MCNC: 210 MG/DL (ref 65–99)
GLUCOSE SERPL-MCNC: 135 MG/DL (ref 65–99)
HCT VFR BLD AUTO: 36.8 % (ref 42–52)
HGB BLD-MCNC: 11.5 G/DL (ref 14–18)
INR PPP: 1.5 (ref 0.87–1.13)
MCH RBC QN AUTO: 31 PG (ref 27–33)
MCHC RBC AUTO-ENTMCNC: 31.3 G/DL (ref 32.3–36.5)
MCV RBC AUTO: 99.2 FL (ref 81.4–97.8)
PLATELET # BLD AUTO: 198 K/UL (ref 164–446)
PMV BLD AUTO: 9.9 FL (ref 9–12.9)
POTASSIUM SERPL-SCNC: 4.2 MMOL/L (ref 3.6–5.5)
PROT SERPL-MCNC: 5.3 G/DL (ref 6–8.2)
PROTHROMBIN TIME: 18.3 SEC (ref 12–14.6)
RBC # BLD AUTO: 3.71 M/UL (ref 4.7–6.1)
RH BLD: NORMAL
SODIUM SERPL-SCNC: 139 MMOL/L (ref 135–145)
WBC # BLD AUTO: 9.9 K/UL (ref 4.8–10.8)

## 2024-03-04 PROCEDURE — 700102 HCHG RX REV CODE 250 W/ 637 OVERRIDE(OP): Performed by: STUDENT IN AN ORGANIZED HEALTH CARE EDUCATION/TRAINING PROGRAM

## 2024-03-04 PROCEDURE — 85730 THROMBOPLASTIN TIME PARTIAL: CPT

## 2024-03-04 PROCEDURE — 86850 RBC ANTIBODY SCREEN: CPT

## 2024-03-04 PROCEDURE — 93306 TTE W/DOPPLER COMPLETE: CPT

## 2024-03-04 PROCEDURE — 36415 COLL VENOUS BLD VENIPUNCTURE: CPT

## 2024-03-04 PROCEDURE — 770020 HCHG ROOM/CARE - TELE (206)

## 2024-03-04 PROCEDURE — A9270 NON-COVERED ITEM OR SERVICE: HCPCS | Performed by: STUDENT IN AN ORGANIZED HEALTH CARE EDUCATION/TRAINING PROGRAM

## 2024-03-04 PROCEDURE — 86900 BLOOD TYPING SEROLOGIC ABO: CPT

## 2024-03-04 PROCEDURE — 82077 ASSAY SPEC XCP UR&BREATH IA: CPT

## 2024-03-04 PROCEDURE — 80053 COMPREHEN METABOLIC PANEL: CPT

## 2024-03-04 PROCEDURE — 305948 HCHG GREEN TRAUMA ACT PRE-NOTIFY NO CC

## 2024-03-04 PROCEDURE — 85610 PROTHROMBIN TIME: CPT

## 2024-03-04 PROCEDURE — 73060 X-RAY EXAM OF HUMERUS: CPT | Mod: LT

## 2024-03-04 PROCEDURE — 86901 BLOOD TYPING SEROLOGIC RH(D): CPT

## 2024-03-04 PROCEDURE — 99223 1ST HOSP IP/OBS HIGH 75: CPT | Mod: AI | Performed by: STUDENT IN AN ORGANIZED HEALTH CARE EDUCATION/TRAINING PROGRAM

## 2024-03-04 PROCEDURE — 82962 GLUCOSE BLOOD TEST: CPT | Mod: 91

## 2024-03-04 PROCEDURE — 93005 ELECTROCARDIOGRAM TRACING: CPT | Performed by: EMERGENCY MEDICINE

## 2024-03-04 PROCEDURE — 85027 COMPLETE CBC AUTOMATED: CPT

## 2024-03-04 PROCEDURE — 99285 EMERGENCY DEPT VISIT HI MDM: CPT

## 2024-03-04 PROCEDURE — 700105 HCHG RX REV CODE 258: Performed by: EMERGENCY MEDICINE

## 2024-03-04 RX ORDER — DULOXETIN HYDROCHLORIDE 30 MG/1
30 CAPSULE, DELAYED RELEASE ORAL DAILY
COMMUNITY
Start: 2024-02-07

## 2024-03-04 RX ORDER — CHOLECALCIFEROL (VITAMIN D3) 25 MCG
1000 TABLET,CHEWABLE ORAL DAILY
COMMUNITY

## 2024-03-04 RX ORDER — ATORVASTATIN CALCIUM 80 MG/1
80 TABLET, FILM COATED ORAL NIGHTLY
Status: DISCONTINUED | OUTPATIENT
Start: 2024-03-04 | End: 2024-03-09 | Stop reason: HOSPADM

## 2024-03-04 RX ORDER — SODIUM CHLORIDE, SODIUM LACTATE, POTASSIUM CHLORIDE, CALCIUM CHLORIDE 600; 310; 30; 20 MG/100ML; MG/100ML; MG/100ML; MG/100ML
1000 INJECTION, SOLUTION INTRAVENOUS ONCE
Status: COMPLETED | OUTPATIENT
Start: 2024-03-04 | End: 2024-03-04

## 2024-03-04 RX ORDER — TRAMADOL HYDROCHLORIDE 50 MG/1
50 TABLET ORAL EVERY 4 HOURS PRN
Status: DISCONTINUED | OUTPATIENT
Start: 2024-03-04 | End: 2024-03-07

## 2024-03-04 RX ORDER — CARVEDILOL 6.25 MG/1
6.25 TABLET ORAL 2 TIMES DAILY WITH MEALS
Status: DISCONTINUED | OUTPATIENT
Start: 2024-03-04 | End: 2024-03-08

## 2024-03-04 RX ORDER — DEXTROSE MONOHYDRATE 25 G/50ML
25 INJECTION, SOLUTION INTRAVENOUS
Status: DISCONTINUED | OUTPATIENT
Start: 2024-03-04 | End: 2024-03-06

## 2024-03-04 RX ORDER — FINASTERIDE 5 MG/1
5 TABLET, FILM COATED ORAL DAILY
Status: DISCONTINUED | OUTPATIENT
Start: 2024-03-04 | End: 2024-03-09 | Stop reason: HOSPADM

## 2024-03-04 RX ORDER — ACETAMINOPHEN 325 MG/1
650 TABLET ORAL EVERY 6 HOURS PRN
Status: DISCONTINUED | OUTPATIENT
Start: 2024-03-04 | End: 2024-03-09 | Stop reason: HOSPADM

## 2024-03-04 RX ORDER — DAPAGLIFLOZIN 10 MG/1
10 TABLET, FILM COATED ORAL DAILY
Status: DISCONTINUED | OUTPATIENT
Start: 2024-03-04 | End: 2024-03-09 | Stop reason: HOSPADM

## 2024-03-04 RX ADMIN — ATORVASTATIN CALCIUM 80 MG: 80 TABLET, FILM COATED ORAL at 19:50

## 2024-03-04 RX ADMIN — FINASTERIDE 5 MG: 5 TABLET, FILM COATED ORAL at 05:11

## 2024-03-04 RX ADMIN — DAPAGLIFLOZIN 10 MG: 10 TABLET, FILM COATED ORAL at 05:11

## 2024-03-04 RX ADMIN — ACETAMINOPHEN 650 MG: 325 TABLET, FILM COATED ORAL at 07:51

## 2024-03-04 RX ADMIN — SODIUM CHLORIDE, POTASSIUM CHLORIDE, SODIUM LACTATE AND CALCIUM CHLORIDE 1000 ML: 600; 310; 30; 20 INJECTION, SOLUTION INTRAVENOUS at 01:15

## 2024-03-04 RX ADMIN — INSULIN HUMAN 1 UNITS: 100 INJECTION, SOLUTION PARENTERAL at 17:30

## 2024-03-04 RX ADMIN — INSULIN HUMAN 2 UNITS: 100 INJECTION, SOLUTION PARENTERAL at 05:25

## 2024-03-04 RX ADMIN — INSULIN GLARGINE-YFGN 15 UNITS: 100 INJECTION, SOLUTION SUBCUTANEOUS at 17:27

## 2024-03-04 RX ADMIN — ATORVASTATIN CALCIUM 80 MG: 80 TABLET, FILM COATED ORAL at 04:15

## 2024-03-04 RX ADMIN — CARVEDILOL 6.25 MG: 6.25 TABLET, FILM COATED ORAL at 07:39

## 2024-03-04 ASSESSMENT — ENCOUNTER SYMPTOMS
ABDOMINAL PAIN: 0
FALLS: 1
SHORTNESS OF BREATH: 0
NAUSEA: 0
FEVER: 0
COUGH: 0
VOMITING: 0
DIZZINESS: 1
CHILLS: 0
DIARRHEA: 0

## 2024-03-04 ASSESSMENT — COGNITIVE AND FUNCTIONAL STATUS - GENERAL
MOVING TO AND FROM BED TO CHAIR: A LOT
MOBILITY SCORE: 14
STANDING UP FROM CHAIR USING ARMS: A LITTLE
DRESSING REGULAR LOWER BODY CLOTHING: A LOT
DRESSING REGULAR UPPER BODY CLOTHING: A LITTLE
SUGGESTED CMS G CODE MODIFIER DAILY ACTIVITY: CK
SUGGESTED CMS G CODE MODIFIER MOBILITY: CL
CLIMB 3 TO 5 STEPS WITH RAILING: A LOT
WALKING IN HOSPITAL ROOM: A LOT
MOVING FROM LYING ON BACK TO SITTING ON SIDE OF FLAT BED: A LOT
TOILETING: A LITTLE
HELP NEEDED FOR BATHING: A LOT
TURNING FROM BACK TO SIDE WHILE IN FLAT BAD: A LITTLE
DAILY ACTIVITIY SCORE: 18

## 2024-03-04 ASSESSMENT — PATIENT HEALTH QUESTIONNAIRE - PHQ9
2. FEELING DOWN, DEPRESSED, IRRITABLE, OR HOPELESS: NOT AT ALL
SUM OF ALL RESPONSES TO PHQ9 QUESTIONS 1 AND 2: 0
1. LITTLE INTEREST OR PLEASURE IN DOING THINGS: NOT AT ALL

## 2024-03-04 ASSESSMENT — PAIN DESCRIPTION - PAIN TYPE
TYPE: ACUTE PAIN
TYPE: ACUTE PAIN

## 2024-03-04 ASSESSMENT — LIFESTYLE VARIABLES
CONSUMPTION TOTAL: NEGATIVE
ON A TYPICAL DAY WHEN YOU DRINK ALCOHOL HOW MANY DRINKS DO YOU HAVE: 0
EVER HAD A DRINK FIRST THING IN THE MORNING TO STEADY YOUR NERVES TO GET RID OF A HANGOVER: NO
HOW MANY TIMES IN THE PAST YEAR HAVE YOU HAD 5 OR MORE DRINKS IN A DAY: 0
AVERAGE NUMBER OF DAYS PER WEEK YOU HAVE A DRINK CONTAINING ALCOHOL: 0
TOTAL SCORE: 0
HAVE PEOPLE ANNOYED YOU BY CRITICIZING YOUR DRINKING: NO
ALCOHOL_USE: NO
TOTAL SCORE: 0
HAVE YOU EVER FELT YOU SHOULD CUT DOWN ON YOUR DRINKING: NO
TOTAL SCORE: 0
EVER FELT BAD OR GUILTY ABOUT YOUR DRINKING: NO

## 2024-03-04 ASSESSMENT — FIBROSIS 4 INDEX
FIB4 SCORE: 1.9
FIB4 SCORE: 1.01

## 2024-03-04 NOTE — CARE PLAN
The patient is Stable - Low risk of patient condition declining or worsening    Shift Goals  Clinical Goals: NPO, ortho consult, pain control  Patient Goals: Pain control  Family Goals: na    Progress made toward(s) clinical / shift goals:    NPO at midnight, surgery scheduled for tomorrow.  Refer to MAR for pain control.     Problem: Knowledge Deficit - Standard  Goal: Patient and family/care givers will demonstrate understanding of plan of care, disease process/condition, diagnostic tests and medications  Outcome: Progressing  Note: Discuss and review POC with patient/family. Re-educate as needed.      Problem: Fall Risk  Goal: Patient will remain free from falls  Outcome: Progressing  Note: Treaded socks and bed/strip alarm on, side rails up x 3. Call light within reach. Pt educated to call for assistance. Reinforce as needed.      Problem: Pain - Standard  Goal: Alleviation of pain or a reduction in pain to the patient’s comfort goal  Outcome: Progressing       Patient is not progressing towards the following goals:

## 2024-03-04 NOTE — ED NOTES
Med rec is complete per Pt's family by phone.    Allergies reviewed.    Has patient had any outside antibiotics in the last 30 days? N    Any Anticoagulants (rivaroxaban, apixaban, edoxaban, dabigatran, warfarin, enoxaparin) taken in the last 14 days? Y  Anticoagulant name: Eliquis , Last dose: 3/3/24@ am.        Pharmacy/Pharmacies Pt utilizes : [facility] [phone].    Long term [facility]

## 2024-03-04 NOTE — ASSESSMENT & PLAN NOTE
S/p  Open reduction internal fixation left humeral shaft with intramedullary nail and internal fixation left intra-articular distal radius fracture on 3/5  Pain management  PT/OT recommending SNF   assisting

## 2024-03-04 NOTE — ASSESSMENT & PLAN NOTE
Multifactorial  Repeat echocardiogram with ejection fraction around 30%  AICD interrogation was unremarkable

## 2024-03-04 NOTE — ED NOTES
Transfer from [facility] Pt found at the bottom of basement stairs, had gotten dizzy and slid down. +head strike, +LOC

## 2024-03-04 NOTE — ASSESSMENT & PLAN NOTE
Unclear etiology, hemoglobin 11.5 on presentation here his baseline based on previous labs is 15  No signs of GI bleeding  Improved PRBC transfusion  Added ferrous sulfate

## 2024-03-04 NOTE — THERAPY
Physical Therapy Contact Note    Patient Name: Nolberto Severino  Age:  83 y.o., Sex:  male  Medical Record #: [id number]  Today's Date: 3/4/2024         03/04/24 0917   Initial Contact Note    Initial Contact Note Order Received and Verified, Physical Therapy Evaluation in Progress with Full Report to Follow.   Interdisciplinary Plan of Care Collaboration   Collaboration Comments PT orders received. Per chart review, pt is pending ortho consult. Will hold and re-attempt as appropriate/able.   Session Information   Date / Session Number  3/4 - hold  (EVAL)     Shruthi Gutiérrez, PT, DPT

## 2024-03-04 NOTE — PROGRESS NOTES
Orthopedics Progress Note    Left humerus fracture  Left distal radius fracture    Discussed with patient, patient walker dependent at baseline.     -imaging reviewed  -NPO  -OR today for IM nail left humerus and ORIF left distal radius      Moises Gerardo CUNNINGHAM  Ortho Trauma   [place]

## 2024-03-04 NOTE — ED NOTES
BREAK RN   Pt given turkey sandwich and ice water per hospitalist Kavita order, after meal pt to be NPO. Pt educated and verbalized understanding.

## 2024-03-04 NOTE — ED TRIAGE NOTES
Chief Complaint   Patient presents with    Trauma Green     Transfer from [facility] Pt found at the bottom of basement stairs, had gotten dizzy and slid down. +head strike, +LOC, +thinners       BIB [facility] to trauma bay for trauma green transfer, pt on monitor and in gown, labs drawn and sent. Pt consists of: above complaint, pt A&Ox4, on room air, L arm splinted PTA. Pt given morphine at prior facility, became hypotensive, given fluids and BP to normotensive. Pt found to have a L humerus and radius fx at prior facility. Pt seen by ERP upon arrival, see trauma narrator for assessment.    Medications given en route:n/a     BP (!) 83/54   Pulse 88   Temp 35.9 °C (96.7 °F)   Resp 18   Ht 1.829 m (6')   Wt 77.1 kg (170 lb)   SpO2 94%   BMI 23.06 kg/m²

## 2024-03-04 NOTE — ED PROVIDER NOTES
ED Provider Note    CHIEF COMPLAINT  Chief Complaint   Patient presents with    Trauma Green     Transfer from [facility] Pt found at the bottom of basement stairs, had gotten dizzy and slid down. +head strike, +LOC, +thinners       EXTERNAL RECORDS REVIEWED  External ED Note patient was seen at [facility] this evening after a fall from the stairs.  He had a left-sided humerus and radial fracture.  Received some morphine and subsequently dropped his blood pressure.  Received some IV fluid resuscitation and then had a drop in his hemoglobin.  CT scan chest abdomen pelvis showed no acute process beyond a small hematoma no active extravasation.  The patient is on Eliquis    CT chest abdomen pelvis with a very small buttock hematoma without active extravasation  CT head without bleed or fracture  CT C-spine without bleed or fracture    Hgb went from 14.8 to 11.2 INR was 1.7     HPI/ROS  LIMITATION TO HISTORY   Select: : None  OUTSIDE HISTORIAN(S):  EMS patient was stable in route did have a little dips in his blood pressure but required no intervention.    Nolberto Severino is a 83 y.o. male who presents to the emerged department as a transfer from [facility].  The patient states he tripped and fell down the stairs this evening usually walks with a walker but he just caught his foot lost his balance.  He states his pain is under control for his left upper extremity when he is not moving it.  He is not having headache or neck pain at this time.  He denies any chest pain or shortness of breath prior to the episode.  He does take Eliquis for history of atrial fibrillation.    PAST MEDICAL HISTORY   has a past medical history of Adverse effect of anesthesia (2010), Anesthesia, Bradycardia, Cataract, Chronic anticoagulation, Diabetes ([facility]), HFrEF (heart failure with reduced ejection fraction) ([facility]) (11/2022), High cholesterol, Hypertension, Indigestion, Myocardial infarct ([facility]) (07/2022), Neuropathy,  Paroxysmal atrial fibrillation (HCC), Stroke (HCC), and Syncope.    SURGICAL HISTORY   has a past surgical history that includes pacemaker insertion (Left, 2019); intraocular lens implant (Bilateral); hip replacement, total (Left); and aicd implant (Left, 2023).    FAMILY HISTORY  No family history on file.    SOCIAL HISTORY  Social History     Tobacco Use    Smoking status: Former     Current packs/day: 0.00     Types: Cigarettes, Pipe     Quit date:      Years since quittin.2    Smokeless tobacco: Never   Vaping Use    Vaping Use: Never used   Substance and Sexual Activity    Alcohol use: Yes     Comment: rarely    Drug use: Never    Sexual activity: Not on file       CURRENT MEDICATIONS  Home Medications    **Home medications have not yet been reviewed for this encounter**         ALLERGIES  No Known Allergies    PHYSICAL EXAM  VITAL SIGNS: /71   Pulse 93   Temp 35.9 °C (96.7 °F)   Resp 20   Ht 1.829 m (6')   Wt 77.1 kg (170 lb)   SpO2 93%   BMI 23.06 kg/m²    Pulse OX: Pulse Oxygen level is within normal limit  Constitutional: Alert in no apparent distress.  HENT: Normocephalic, a few skin tears to the occiput and top of the scalp without hematoma no midline cervical spine tenderness, MMM  Eyes: PERound. Conjunctiva normal, non-icteric.   Heart: Irregular irregular AICD left upper chest clean dry and intact intact distal pulses   Lungs: Symmetrical movement, no resp distress   Abdomen: Non-tender, non-distended, normal bowel sounds  EXT/Back left upper extremity in a splint sensation intact to light touch on fingers distally cap refill less than 3 seconds no other trauma to the right upper extremity or bilateral knees thighs or ankles  Skin: Warm, Dry, No erythema, No rash.   Neurologic: Alert and oriented, Grossly non-focal.       DIAGNOSTIC STUDIES / PROCEDURES  EKG  I have independently interpreted this EKG  Results for orders placed or performed during the hospital encounter  of 24   EKG (NOW)   Result Value Ref Range    Report       [facility] Emergency Dept.    Test Date:  2024  Pt Name:    JAI SHARPE                  Department: ER  MRN:        [id number]                      Room:       BL 21  Gender:     Male                         Technician: [id number]  :        [id number]                   Requested By:ANGELINA BASS  Order #:    [id number]                    Reading MD: Angelina Bass MD    Measurements  Intervals                                Axis  Rate:       93                           P:          0  MT:         0                            QRS:        22  QRSD:       114                          T:          157  QT:         442  QTc:        550    Interpretive Statements  Atrial fibrillation at a rate of 93 no ST elevations or ST depressions's  prior anterior Q waves unchanged from prior, conduction delay unchanged  Compared to ECG 2023 15:22:24  no sig change  Electronically Signed On 2024 01:46:31 PST by Angelina Bass MD           LABS  Labs Reviewed   CBC WITHOUT DIFFERENTIAL - Abnormal; Notable for the following components:       Result Value    RBC 3.71 (*)     Hemoglobin 11.5 (*)     Hematocrit 36.8 (*)     MCV 99.2 (*)     MCHC 31.3 (*)     RDW 59.7 (*)     All other components within normal limits   COMP METABOLIC PANEL - Abnormal; Notable for the following components:    Glucose 135 (*)     Calcium 7.8 (*)     Correct Calcium 8.3 (*)     Total Protein 5.3 (*)     All other components within normal limits   DIAGNOSTIC ALCOHOL   COD (ADULT)   ABO RH CONFIRM   ESTIMATED GFR   PROTHROMBIN TIME   APTT   COMPONENT CELLULAR           COURSE & MEDICAL DECISION MAKING    ED Observation Status? No; Patient does not meet criteria for ED Observation.     INITIAL ASSESSMENT, COURSE AND PLAN  Care Narrative:     Patient is a 3-year-old male presents emerged department for difficulty with ambulating after ground-level fall and  fractures to the left upper extremity when he uses a walker at baseline.  There was a drop in his blood pressure after he received morphine also drop in hemoglobin after IV fluid resuscitation with CT scan showing no active bleeding anywhere.  He could have lost a little bit of blood into the humerus fracture but distally appears to be cap refill is intact.  Will repeat the patient's hemoglobin likely discussed case with orthopedics and trauma surgery although likely is going to end up going to medicine for more of a PT OT evaluation      DISPOSITION AND DISCUSSIONS  12:41 AM   Spoke w DR Martinez who recommends medicine admission at this time as there is no other signs of traumatic injury beyond the orthopedic injuries    12:47 AM  Spoke w Dr Carrillo who will consult on the patient in the morning.    1:19 AM  I spoke with Dr. Walls with the medicine service and he has accepted the patient to his service.      Patient had a little transient hypotension going from the trauma bay to the blue pod but his blood pressure came up without intervention IV fluids had been started I requested that we just cannot go down to about 75 an hour but by the time they were stopped a 500 cc bolus had been given.  The patient is otherwise resting comfortably is in A-fib without RVR and no acute complaints at this time.    HYDRATION: Based on the patient's presentation of Hypotension the patient was given IV fluids. IV Hydration was used because oral hydration was not as rapid as required. Upon recheck following hydration, the patient was improved - only received 500 cc's.          I have discussed management of the patient with the following physicians and RENUKA's:  Gerardo Martinez Awad    Discussion of management with other [facility] or appropriate source(s): None       FINAL DIAGNOSIS  1. Fall down stairs, initial encounter    2. Closed displaced comminuted fracture of shaft of left humerus, initial encounter    3. Closed Colles' fracture  of left radius, initial encounter           Electronically signed by: Angelina Garcia M.D., 3/4/2024 12:27 AM

## 2024-03-04 NOTE — H&P
Hospital Medicine History & Physical Note    Date of Service  3/4/2024    Primary Care Physician  Demetrius Sarabia D.O.    Consultants  orthopedics    Code Status  Full Code    Chief Complaint  Chief Complaint   Patient presents with    Trauma Green     Transfer from [facility] Pt found at the bottom of basement stairs, had gotten dizzy and slid down. +head strike, +LOC, +thinners       History of Presenting Illness  Nolberto Severino is a 83 y.o. male who was transferred from outside facility 3/4/2024 with humerus fracture.  PMH of atrial fibrillation on anticoagulation, HFrEF (echo 04/2022 shows an EF of 25-30% with reduced RV function mild MR and AR, mild to moderate TR) s/p ICD, insulin-dependent diabetes, dyslipidemia.  Patient fell while going down into his basement today, he passed out and hit his head and scraped his scalp.  Denies any chest pain or palpitations, he says he frequently feels dizzy and falls.  The last time this happened was about 4 weeks ago, says it usually happens more frequently than every 4 weeks.  Today's fall similar to previous falls  Gets dizzy and either has a syncopal event or presyncopal event.    At the outside facility imaging showed left humerus and radius fracture.  He initially had a normal BP, after morphine at the outside facility his blood pressure dropped to the 70s-80s systolic, improved with IV fluids.  Labs at the outside facility showed anemia, patient denies any signs of bleeding.    In the ED here afebrile, in atrial fibrillation with RVR my evaluation up to the 120s.  Blood pressure was initially in the 80s systolic, improved after 1 L IV fluid bolus.  Labs show hemoglobin of 11.5, baseline is 15.    I discussed the plan of care with patient, bedside RN, and EDP .    Review of Systems  Review of Systems   Constitutional:  Negative for chills and fever.   Respiratory:  Negative for cough and shortness of breath.    Cardiovascular:  Negative for chest pain.    Gastrointestinal:  Negative for abdominal pain, diarrhea, nausea and vomiting.   Genitourinary:  Negative for dysuria and urgency.   Musculoskeletal:  Positive for falls and joint pain.   Neurological:  Positive for dizziness.       Past Medical History   has a past medical history of Adverse effect of anesthesia (2010), Anesthesia, Bradycardia, Cataract, Chronic anticoagulation, Diabetes ([facility]), HFrEF (heart failure with reduced ejection fraction) ([facility]) (11/2022), High cholesterol, Hypertension, Indigestion, Myocardial infarct ([facility]) (07/2022), Neuropathy, Paroxysmal atrial fibrillation ([facility]), Stroke ([facility]), and Syncope.    Surgical History   has a past surgical history that includes pacemaker insertion (Left, 02/08/2019); intraocular lens implant (Bilateral); hip replacement, total (Left); and aicd implant (Left, 01/09/2023).     Family History  Family history reviewed with patient. There is no family history that is pertinent to the chief complaint.     Social History   reports that he quit smoking about 66 years ago. His smoking use included cigarettes and pipe. He has never used smokeless tobacco. He reports current alcohol use. He reports that he does not use drugs.    Allergies  No Known Allergies    Medications  Prior to Admission Medications   Prescriptions Last Dose Informant Patient Reported? Taking?   Insulin Degludec (TRESIBA FLEXTOUCH) 100 UNIT/ML Solution Pen-injector  Significant Other Yes No   Sig: Inject 30 Units under the skin every evening.   TRULICITY 1.5 MG/0.5ML Solution Pen-injector  Significant Other Yes No   Sig: Inject 0.5 mL under the skin every Saturday. On Sat   apixaban (ELIQUIS) 5mg Tab   No No   Sig: Take 1 Tablet by mouth 2 times a day.   atorvastatin (LIPITOR) 80 MG tablet  Significant Other Yes No   Sig: Take 80 mg by mouth every evening.   carvedilol (COREG) 6.25 MG Tab   No No   Sig: TAKE 1 TABLET BY MOUTH TWICE DAILY WITH MEALS   clopidogrel (PLAVIX) 75 MG Tab  Significant Other  Yes No   Sig: Take 75 mg by mouth every day.   dapagliflozin propanediol (FARXIGA) 10 MG Tab  Significant Other No No   Sig: Take 1 Tablet by mouth every day.   finasteride (PROSCAR) 5 MG Tab   Yes No   furosemide (LASIX) 20 MG Tab   No No   Sig: Take 1 Tablet by mouth 1 time a day as needed (for fluid retention).   gabapentin (NEURONTIN) 300 MG Cap  Significant Other Yes No   Sig: Take 600 mg by mouth 3 times a day.   insulin aspart (NOVOLOG) 100 UNIT/ML Solution   Yes No   Sig: Inject  under the skin as needed for High Blood Sugar.   metFORMIN (GLUCOPHAGE) 500 MG Tab  Significant Other Yes No   Sig: Take 500 mg by mouth every evening.   sacubitril-valsartan (ENTRESTO) 24-26 MG Tab  Significant Other No No   Sig: Take 1 Tablet by mouth 2 times a day.   spironolactone (ALDACTONE) 25 MG Tab  Significant Other Yes No   Sig: Take 25 mg by mouth every day.      Facility-Administered Medications: None       Physical Exam  Temp:  [35.9 °C (96.7 °F)] 35.9 °C (96.7 °F)  Pulse:  [] 93  Resp:  [18-20] 20  BP: ()/(54-71) 125/71  SpO2:  [93 %-95 %] 93 %  Blood Pressure : 125/71   Temperature: 35.9 °C (96.7 °F)   Pulse: 93   Respiration: 20   Pulse Oximetry: 93 %       Physical Exam  Constitutional:       Appearance: Normal appearance.   HENT:      Head: Normocephalic and atraumatic.      Mouth/Throat:      Mouth: Mucous membranes are moist.      Pharynx: Oropharynx is clear. No oropharyngeal exudate or posterior oropharyngeal erythema.   Eyes:      General: No scleral icterus.  Cardiovascular:      Rate and Rhythm: Tachycardia present. Rhythm irregular.      Pulses: Normal pulses.      Heart sounds: Normal heart sounds. No murmur heard.  Pulmonary:      Effort: Pulmonary effort is normal. No respiratory distress.      Breath sounds: Normal breath sounds. No wheezing.   Abdominal:      Palpations: Abdomen is soft.      Tenderness: There is no abdominal tenderness.   Musculoskeletal:         General: Tenderness  "present.      Cervical back: Normal range of motion.      Right lower leg: Edema present.      Left lower leg: Edema present.   Skin:     General: Skin is warm and dry.      Comments: Abrasions on his scalp   Neurological:      General: No focal deficit present.      Mental Status: He is alert and oriented to person, place, and time. Mental status is at baseline.   Psychiatric:         Mood and Affect: Mood normal.         Laboratory:  Recent Labs     03/04/24  0042   WBC 9.9   RBC 3.71*   HEMOGLOBIN 11.5*   HEMATOCRIT 36.8*   MCV 99.2*   MCH 31.0   MCHC 31.3*   RDW 59.7*   PLATELETCT 198   MPV 9.9     Recent Labs     03/04/24  0042   SODIUM 139   POTASSIUM 4.2   CHLORIDE 108   CO2 20   GLUCOSE 135*   BUN 22   CREATININE 1.10   CALCIUM 7.8*     Recent Labs     03/04/24 0042   ALTSGPT 19   ASTSGOT 20   ALKPHOSPHAT 50   TBILIRUBIN 1.0   GLUCOSE 135*         No results for input(s): \"NTPROBNP\" in the last 72 hours.      No results for input(s): \"TROPONINT\" in the last 72 hours.    Imaging:  OUTSIDE IMAGES-CT CHEST/ABDOMEN/PELVIS   Final Result      OUTSIDE IMAGES-CT CERVICAL SPINE   Final Result      OUTSIDE IMAGES-CT HEAD   Final Result          X-Ray:  I have personally reviewed the images and compared with prior images. and My impression is: X-ray from outside facility shows left humerus and radial fracture  EKG:  I have personally reviewed the images and compared with prior images. and My impression is: Atrial fibrillation, LBBB, similar to prior    Assessment/Plan:  Justification for Admission Status  I anticipate this patient will require at least two midnights for appropriate medical management, necessitating inpatient admission because humeral fracture and A-fib RVR    * Left supracondylar humerus fracture, closed, initial encounter- (present on admission)  Assessment & Plan  X-ray outside facility shows left humerus fracture following fall.  EDP discussed with Ortho who will evaluate the patient  Tramadol " ordered for pain, morphine because significant hypotension  PT, OT    Atrial fibrillation with RVR (HCC)- (present on admission)  Assessment & Plan  Patient is in and out of RVR on evaluation.  He is hypertensive on presentation but will continue his carvedilol with BP parameters  Continue close monitoring on telemetry.  Digoxin or amiodarone if he goes into sustained A-fib due to his blood pressure lability  Hold apixaban due to possible surgery until after Ortho evaluation    Chronic systolic congestive heart failure (HCC)- (present on admission)  Assessment & Plan  CHF with an EF of 25-30% and reduced RV function.  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  N.p.o. now, fluid and salt restriction when he has a diet  He is on carvedilol, spironolactone, Entresto.  Patient has recurrent episodes of dizziness and syncope/presyncope and was hypotensive on presentation  Will hold spironolactone and Entresto, will need medication dose reductions/discontinuation    Syncope and collapse- (present on admission)  Assessment & Plan  Recurrent syncopal and presyncopal events, patient says he has frequent falls due to dizziness  He was found to be hypotensive at outside facility and on presentation here which improved with IV fluids  He has CHF with an EF of 25-35% and RV dysfunction needs close hemodynamic monitoring to avoid volume overload as he already has bilateral lower extremity pitting edema  Will continue with carvedilol due to A-fib, will hold spironolactone and Entresto at this time  Patient also found to be anemic with a hemoglobin of 11.5 however there are no signs of bleeding  He also has a history of A-fib and is in and out of RVR on my evaluation which may also be contributing    Anemia- (present on admission)  Assessment & Plan  Unclear etiology, hemoglobin 11.5 on presentation here his baseline based on previous labs is 15  No  signs of GI bleeding  Possible cause of current symptoms today however, patient says he has recurrent episodes of syncope/presyncope  CBC ordered to continue monitoring    Hypotension due to drugs- (present on admission)  Assessment & Plan  Blood pressure decreased to outside facility following morphine administration  Improved with IV fluids  Patient does have a history of recurrent syncopal/presyncopal events likely due to his CHF meds  Continue close monitoring on telemetry    Fracture of radius- (present on admission)  Assessment & Plan  X-ray at outside facility showed left radius fracture.  Ortho consulted    Encounter for preoperative assessment- (present on admission)  Assessment & Plan  Admit to:    Telemetry with continuous pulse oximetry due to: age 65 or older with history of: cardiac arrhythmias and CHF    Cardiovascular:   Patient has history of CHF: Continue home beta blocker and rate control medications.   Pre-op EKG: Yes    Pulmonary:  Oxygen per protocol    GI:   No history of cirrhosis. Standard bowel regimen. Hold for loose stools.    Renal:   IV fluids: No fluids - risk of fluid overload      Musculoskeletal:   Check 25 OH vitamin D level. If 31-40 pg/mL, consider starting vitamin D3 1000 IU PO daily. If 20-30 pg/mL, consider vitamin D3 2000 IU PO daily. If <20 pg/mL, vitamin D2 50,000 IU weekly x 8 weeks then 2000 IU PO daily.    Neurologic:   Pain control: tramadol    Hematologic:  Plan on pharmacologic DVT prophylaxis post operative day #1. Hold for decreasing hemoglobin. Notify provider for hemoglobin less than 8.  Order preoperative type and cross.   Hgb every 6 hours if high bleeding risk.   If patient was on anticoagulation prior to arrival risks and benefits will be weighed by teams including surgery, hospitalist, geriatrics, and anesthesia for delaying surgery more than 24 hours.   On anticoagulation prior to arrival: Yes: Significant preoperative anemia or INR greater than 1.5, draw  hgb/hct every 4 hours for 24 hours, INR daily for 2 days.     Medical Assessment Risk:  High    Surgical Risk:   High          VTE prophylaxis: pharmacologic prophylaxis contraindicated due to expected procedure

## 2024-03-04 NOTE — ED NOTES
Unable to complete Med Rec at this time. Pt reports that his wife has the list of all his medications. Unable to reach Pt's wife at this time.  Home Pharm[facility]:  [facility]

## 2024-03-04 NOTE — CARE PLAN
The patient is Stable - Low risk of patient condition declining or worsening         Progress made toward(s) clinical / shift goals:  Progressing      Problem: Knowledge Deficit - Standard  Goal: Patient and family/care givers will demonstrate understanding of plan of care, disease process/condition, diagnostic tests and medications  Outcome: Progressing  Note: Pt verbalizes understanding of plan of care     Problem: Fall Risk  Goal: Patient will remain free from falls  Outcome: Progressing  Note: Pt wearing treaded slipper socks, strip alarm in use, call light within reach

## 2024-03-04 NOTE — PROGRESS NOTES
Orthopedics progress note    Due to operating room availability and nonemergent nature of patient's injury patient surgery canceled today and rescheduled for tomorrow a.m.    N.p.o. at midnight  Plan for IM nail left humerus and ORIF left distal radius tomorrow a.m.    Moises Carrillo MD  [facility]

## 2024-03-04 NOTE — THERAPY
Occupational Therapy Contact Note    Patient Name: Nolberto Severino  Age:  83 y.o., Sex:  male  Medical Record #: [id number]  Today's Date: 3/4/2024    OT consult received, per EMR pt planned for OR today for left humerus and left distal radius. Will hold OT evaluation and follow up as able/appropriate post-operatively.      Conor Lucero OTD, OTR/L

## 2024-03-04 NOTE — PROGRESS NOTES
4 Eyes Skin Assessment Completed by GREG Koch and Frankee, RN.    Head Scab, Scratch, and Redness, Skin Tear open  Ears WDL  Nose WDL  Mouth WDL  Neck WDL  Breast/Chest WDL  Shoulder Blades WDL  Spine WDL  (R) Arm/Elbow/Hand Bruising  (L) Arm/Elbow/Hand SATISH dressing  Abdomen WDL  Groin WDL  Scrotum/Coccyx/Buttocks Redness and Blanching  (R) Leg Scab and Bruising  (L) Leg Scab and Bruising  (R) Heel/Foot/Toe Redness and Blanching  (L) Heel/Foot/Toe Redness and Blanching          Devices In Places Tele Box      Interventions In Place Pillows, Q2 Turns, and Pressure Redistribution Mattress    Possible Skin Injury Yes    Pictures Uploaded Into Epic Yes  Wound Consult Placed N/A  RN Wound Prevention Protocol Ordered No

## 2024-03-04 NOTE — ASSESSMENT & PLAN NOTE
S/p  Open reduction internal fixation left humeral shaft with intramedullary nail and internal fixation left intra-articular distal radius fracture on 3/5

## 2024-03-05 ENCOUNTER — APPOINTMENT (OUTPATIENT)
Dept: RADIOLOGY | Facility: MEDICAL CENTER | Age: 84
DRG: 493 | End: 2024-03-05
Attending: STUDENT IN AN ORGANIZED HEALTH CARE EDUCATION/TRAINING PROGRAM
Payer: MEDICARE

## 2024-03-05 LAB
ANION GAP SERPL CALC-SCNC: 11 MMOL/L (ref 7–16)
BUN SERPL-MCNC: 30 MG/DL (ref 8–22)
CALCIUM SERPL-MCNC: 8.5 MG/DL (ref 8.5–10.5)
CHLORIDE SERPL-SCNC: 107 MMOL/L (ref 96–112)
CO2 SERPL-SCNC: 22 MMOL/L (ref 20–33)
CREAT SERPL-MCNC: 1.37 MG/DL (ref 0.5–1.4)
ERYTHROCYTE [DISTWIDTH] IN BLOOD BY AUTOMATED COUNT: 59.2 FL (ref 35.9–50)
GFR SERPLBLD CREATININE-BSD FMLA CKD-EPI: 51 ML/MIN/1.73 M 2
GLUCOSE BLD STRIP.AUTO-MCNC: 113 MG/DL (ref 65–99)
GLUCOSE BLD STRIP.AUTO-MCNC: 114 MG/DL (ref 65–99)
GLUCOSE BLD STRIP.AUTO-MCNC: 115 MG/DL (ref 65–99)
GLUCOSE SERPL-MCNC: 131 MG/DL (ref 65–99)
HCT VFR BLD AUTO: 30.4 % (ref 42–52)
HGB BLD-MCNC: 10.3 G/DL (ref 14–18)
LV EJECT FRACT  99904: 25
LV EJECT FRACT MOD 2C 99903: 30.82
LV EJECT FRACT MOD 4C 99902: 27.81
LV EJECT FRACT MOD BP 99901: 25.95
MCH RBC QN AUTO: 32.3 PG (ref 27–33)
MCHC RBC AUTO-ENTMCNC: 33.9 G/DL (ref 32.3–36.5)
MCV RBC AUTO: 95.3 FL (ref 81.4–97.8)
PLATELET # BLD AUTO: 182 K/UL (ref 164–446)
PMV BLD AUTO: 10.4 FL (ref 9–12.9)
POTASSIUM SERPL-SCNC: 4.3 MMOL/L (ref 3.6–5.5)
RBC # BLD AUTO: 3.19 M/UL (ref 4.7–6.1)
SODIUM SERPL-SCNC: 140 MMOL/L (ref 135–145)
WBC # BLD AUTO: 7.3 K/UL (ref 4.8–10.8)

## 2024-03-05 PROCEDURE — 25608 OPTX DST RD XART FX/EPI SEP2: CPT | Mod: LT | Performed by: STUDENT IN AN ORGANIZED HEALTH CARE EDUCATION/TRAINING PROGRAM

## 2024-03-05 PROCEDURE — 160009 HCHG ANES TIME/MIN: Performed by: STUDENT IN AN ORGANIZED HEALTH CARE EDUCATION/TRAINING PROGRAM

## 2024-03-05 PROCEDURE — 160002 HCHG RECOVERY MINUTES (STAT): Performed by: STUDENT IN AN ORGANIZED HEALTH CARE EDUCATION/TRAINING PROGRAM

## 2024-03-05 PROCEDURE — 160035 HCHG PACU - 1ST 60 MINS PHASE I: Performed by: STUDENT IN AN ORGANIZED HEALTH CARE EDUCATION/TRAINING PROGRAM

## 2024-03-05 PROCEDURE — 82962 GLUCOSE BLOOD TEST: CPT | Mod: 91

## 2024-03-05 PROCEDURE — 80048 BASIC METABOLIC PNL TOTAL CA: CPT

## 2024-03-05 PROCEDURE — 51798 US URINE CAPACITY MEASURE: CPT

## 2024-03-05 PROCEDURE — C1713 ANCHOR/SCREW BN/BN,TIS/BN: HCPCS | Performed by: STUDENT IN AN ORGANIZED HEALTH CARE EDUCATION/TRAINING PROGRAM

## 2024-03-05 PROCEDURE — 93306 TTE W/DOPPLER COMPLETE: CPT | Mod: 26 | Performed by: INTERNAL MEDICINE

## 2024-03-05 PROCEDURE — 24516 TX HUMRL SHAFT FX IMED IMPLT: CPT | Mod: LT | Performed by: STUDENT IN AN ORGANIZED HEALTH CARE EDUCATION/TRAINING PROGRAM

## 2024-03-05 PROCEDURE — 160048 HCHG OR STATISTICAL LEVEL 1-5: Performed by: STUDENT IN AN ORGANIZED HEALTH CARE EDUCATION/TRAINING PROGRAM

## 2024-03-05 PROCEDURE — 700101 HCHG RX REV CODE 250: Performed by: STUDENT IN AN ORGANIZED HEALTH CARE EDUCATION/TRAINING PROGRAM

## 2024-03-05 PROCEDURE — 160029 HCHG SURGERY MINUTES - 1ST 30 MINS LEVEL 4: Performed by: STUDENT IN AN ORGANIZED HEALTH CARE EDUCATION/TRAINING PROGRAM

## 2024-03-05 PROCEDURE — 0PSJ04Z REPOSITION LEFT RADIUS WITH INTERNAL FIXATION DEVICE, OPEN APPROACH: ICD-10-PCS | Performed by: STUDENT IN AN ORGANIZED HEALTH CARE EDUCATION/TRAINING PROGRAM

## 2024-03-05 PROCEDURE — 700111 HCHG RX REV CODE 636 W/ 250 OVERRIDE (IP): Performed by: ANESTHESIOLOGY

## 2024-03-05 PROCEDURE — 770020 HCHG ROOM/CARE - TELE (206)

## 2024-03-05 PROCEDURE — 25608 OPTX DST RD XART FX/EPI SEP2: CPT | Mod: 80ROC,LT | Performed by: STUDENT IN AN ORGANIZED HEALTH CARE EDUCATION/TRAINING PROGRAM

## 2024-03-05 PROCEDURE — A9270 NON-COVERED ITEM OR SERVICE: HCPCS | Performed by: STUDENT IN AN ORGANIZED HEALTH CARE EDUCATION/TRAINING PROGRAM

## 2024-03-05 PROCEDURE — 73110 X-RAY EXAM OF WRIST: CPT | Mod: LT

## 2024-03-05 PROCEDURE — 85027 COMPLETE CBC AUTOMATED: CPT

## 2024-03-05 PROCEDURE — 700105 HCHG RX REV CODE 258: Performed by: ANESTHESIOLOGY

## 2024-03-05 PROCEDURE — 502000 HCHG MISC OR IMPLANTS RC 0278: Performed by: STUDENT IN AN ORGANIZED HEALTH CARE EDUCATION/TRAINING PROGRAM

## 2024-03-05 PROCEDURE — 0PSG06Z REPOSITION LEFT HUMERAL SHAFT WITH INTRAMEDULLARY INTERNAL FIXATION DEVICE, OPEN APPROACH: ICD-10-PCS | Performed by: STUDENT IN AN ORGANIZED HEALTH CARE EDUCATION/TRAINING PROGRAM

## 2024-03-05 PROCEDURE — 99233 SBSQ HOSP IP/OBS HIGH 50: CPT | Performed by: STUDENT IN AN ORGANIZED HEALTH CARE EDUCATION/TRAINING PROGRAM

## 2024-03-05 PROCEDURE — 700102 HCHG RX REV CODE 250 W/ 637 OVERRIDE(OP): Performed by: STUDENT IN AN ORGANIZED HEALTH CARE EDUCATION/TRAINING PROGRAM

## 2024-03-05 PROCEDURE — 24516 TX HUMRL SHAFT FX IMED IMPLT: CPT | Mod: 80ROC,LT | Performed by: STUDENT IN AN ORGANIZED HEALTH CARE EDUCATION/TRAINING PROGRAM

## 2024-03-05 PROCEDURE — 73060 X-RAY EXAM OF HUMERUS: CPT | Mod: LT

## 2024-03-05 PROCEDURE — 700101 HCHG RX REV CODE 250: Performed by: ANESTHESIOLOGY

## 2024-03-05 PROCEDURE — 160041 HCHG SURGERY MINUTES - EA ADDL 1 MIN LEVEL 4: Performed by: STUDENT IN AN ORGANIZED HEALTH CARE EDUCATION/TRAINING PROGRAM

## 2024-03-05 PROCEDURE — 36415 COLL VENOUS BLD VENIPUNCTURE: CPT

## 2024-03-05 PROCEDURE — 110371 HCHG SHELL REV 272: Performed by: STUDENT IN AN ORGANIZED HEALTH CARE EDUCATION/TRAINING PROGRAM

## 2024-03-05 DEVICE — SCREW BONE T8 FULL THREAD L24 MM OD2.7 MM LOCK STARDRIVE NONSTERILE VARIAX FOOT PLATE SYSTEM: Type: IMPLANTABLE DEVICE | Status: FUNCTIONAL

## 2024-03-05 DEVICE — SCREW BONE TITANIUM FULL THREAD L45 MM OD5 MM LOCK STERILE T2 INTRAMEDULLARY NAIL SYSTEM: Type: IMPLANTABLE DEVICE | Status: FUNCTIONAL

## 2024-03-05 DEVICE — IMPLANTABLE DEVICE: Type: IMPLANTABLE DEVICE | Status: FUNCTIONAL

## 2024-03-05 DEVICE — SCREW BONE T8 FULL THREAD L18 MM OD2.7 MM STARDRIVE NONSTERILE VARIAX FOOT PLATE SYSTEM: Type: IMPLANTABLE DEVICE | Status: FUNCTIONAL

## 2024-03-05 DEVICE — SCREW BONE TITANIUM FULL THREAD L40 MM OD5 MM LOCK STERILE T2 INTRAMEDULLARY NAIL SYSTEM: Type: IMPLANTABLE DEVICE | Status: FUNCTIONAL

## 2024-03-05 DEVICE — SCREW BONE T8 FULL THREAD L22 MM OD2.7 MM LOCK STARDRIVE NONSTERILE VARIAX FOOT PLATE SYSTEM: Type: IMPLANTABLE DEVICE | Status: FUNCTIONAL

## 2024-03-05 DEVICE — SCREW BONE VARIAX T8 FULL THREAD L20 MM OD2.7 MM FOOT ANKLE LOCK STARDRIVE NONSTERILE: Type: IMPLANTABLE DEVICE | Status: FUNCTIONAL

## 2024-03-05 DEVICE — WIRE FIXATION KIRSCHNER STAINLESS STEEL L150 MM OD1.25 MM TROCAR POINT NONSTERILE: Type: IMPLANTABLE DEVICE | Status: FUNCTIONAL

## 2024-03-05 DEVICE — WIRE FIXATION KIRSCHNER L150 MM OD1.6 MM: Type: IMPLANTABLE DEVICE | Status: FUNCTIONAL

## 2024-03-05 DEVICE — SCREW BONE TITANIUM FULL THREAD L50 MM OD5 MM LOCK STERILE T2 INTRAMEDULLARY NAIL SYSTEM: Type: IMPLANTABLE DEVICE | Status: FUNCTIONAL

## 2024-03-05 DEVICE — SCREW BONE T8 FULL THREAD L16 MM OD2.7 MM STARDRIVE NONSTERILE VARIAX FOOT PLATE SYSTEM: Type: IMPLANTABLE DEVICE | Status: FUNCTIONAL

## 2024-03-05 DEVICE — SCREW BONE VARIAX T8 FULL THREAD L18 MM OD2.7 MM FOOT ANKLE LOCK STARDRIVE NONSTERILE: Type: IMPLANTABLE DEVICE | Status: FUNCTIONAL

## 2024-03-05 RX ORDER — HALOPERIDOL 5 MG/ML
1 INJECTION INTRAMUSCULAR ONCE
Status: ACTIVE | OUTPATIENT
Start: 2024-03-05 | End: 2024-03-06

## 2024-03-05 RX ORDER — BUPIVACAINE HYDROCHLORIDE AND EPINEPHRINE 5; 5 MG/ML; UG/ML
INJECTION, SOLUTION EPIDURAL; INTRACAUDAL; PERINEURAL
Status: DISCONTINUED | OUTPATIENT
Start: 2024-03-05 | End: 2024-03-05 | Stop reason: HOSPADM

## 2024-03-05 RX ORDER — PHENYLEPHRINE HCL IN 0.9% NACL 0.5 MG/5ML
SYRINGE (ML) INTRAVENOUS PRN
Status: DISCONTINUED | OUTPATIENT
Start: 2024-03-05 | End: 2024-03-05 | Stop reason: SURG

## 2024-03-05 RX ORDER — OXYCODONE HCL 5 MG/5 ML
5 SOLUTION, ORAL ORAL
Status: DISCONTINUED | OUTPATIENT
Start: 2024-03-05 | End: 2024-03-05 | Stop reason: HOSPADM

## 2024-03-05 RX ORDER — SODIUM CHLORIDE, SODIUM LACTATE, POTASSIUM CHLORIDE, CALCIUM CHLORIDE 600; 310; 30; 20 MG/100ML; MG/100ML; MG/100ML; MG/100ML
INJECTION, SOLUTION INTRAVENOUS
Status: DISCONTINUED | OUTPATIENT
Start: 2024-03-05 | End: 2024-03-05 | Stop reason: SURG

## 2024-03-05 RX ORDER — SODIUM CHLORIDE, SODIUM LACTATE, POTASSIUM CHLORIDE, CALCIUM CHLORIDE 600; 310; 30; 20 MG/100ML; MG/100ML; MG/100ML; MG/100ML
INJECTION, SOLUTION INTRAVENOUS CONTINUOUS
Status: DISCONTINUED | OUTPATIENT
Start: 2024-03-05 | End: 2024-03-05 | Stop reason: HOSPADM

## 2024-03-05 RX ORDER — CLOPIDOGREL BISULFATE 75 MG/1
75 TABLET ORAL DAILY
Status: DISCONTINUED | OUTPATIENT
Start: 2024-03-05 | End: 2024-03-09 | Stop reason: HOSPADM

## 2024-03-05 RX ORDER — ONDANSETRON 2 MG/ML
4 INJECTION INTRAMUSCULAR; INTRAVENOUS
Status: DISCONTINUED | OUTPATIENT
Start: 2024-03-05 | End: 2024-03-05 | Stop reason: HOSPADM

## 2024-03-05 RX ORDER — HYDROMORPHONE HYDROCHLORIDE 2 MG/ML
INJECTION, SOLUTION INTRAMUSCULAR; INTRAVENOUS; SUBCUTANEOUS PRN
Status: DISCONTINUED | OUTPATIENT
Start: 2024-03-05 | End: 2024-03-05 | Stop reason: SURG

## 2024-03-05 RX ORDER — HALOPERIDOL 5 MG/ML
1 INJECTION INTRAMUSCULAR
Status: DISCONTINUED | OUTPATIENT
Start: 2024-03-05 | End: 2024-03-05 | Stop reason: HOSPADM

## 2024-03-05 RX ORDER — HYDROMORPHONE HYDROCHLORIDE 1 MG/ML
0.1 INJECTION, SOLUTION INTRAMUSCULAR; INTRAVENOUS; SUBCUTANEOUS
Status: DISCONTINUED | OUTPATIENT
Start: 2024-03-05 | End: 2024-03-05 | Stop reason: HOSPADM

## 2024-03-05 RX ORDER — HYDROMORPHONE HYDROCHLORIDE 1 MG/ML
0.4 INJECTION, SOLUTION INTRAMUSCULAR; INTRAVENOUS; SUBCUTANEOUS
Status: DISCONTINUED | OUTPATIENT
Start: 2024-03-05 | End: 2024-03-05 | Stop reason: HOSPADM

## 2024-03-05 RX ORDER — DIPHENHYDRAMINE HYDROCHLORIDE 50 MG/ML
12.5 INJECTION INTRAMUSCULAR; INTRAVENOUS
Status: DISCONTINUED | OUTPATIENT
Start: 2024-03-05 | End: 2024-03-05 | Stop reason: HOSPADM

## 2024-03-05 RX ORDER — LIDOCAINE HYDROCHLORIDE 20 MG/ML
INJECTION, SOLUTION EPIDURAL; INFILTRATION; INTRACAUDAL; PERINEURAL PRN
Status: DISCONTINUED | OUTPATIENT
Start: 2024-03-05 | End: 2024-03-05 | Stop reason: SURG

## 2024-03-05 RX ORDER — HYDROXYZINE HYDROCHLORIDE 25 MG/1
25 TABLET, FILM COATED ORAL ONCE
Status: DISCONTINUED | OUTPATIENT
Start: 2024-03-05 | End: 2024-03-05

## 2024-03-05 RX ORDER — HYDROMORPHONE HYDROCHLORIDE 1 MG/ML
0.2 INJECTION, SOLUTION INTRAMUSCULAR; INTRAVENOUS; SUBCUTANEOUS
Status: DISCONTINUED | OUTPATIENT
Start: 2024-03-05 | End: 2024-03-05 | Stop reason: HOSPADM

## 2024-03-05 RX ORDER — MAGNESIUM HYDROXIDE 1200 MG/15ML
LIQUID ORAL
Status: COMPLETED | OUTPATIENT
Start: 2024-03-05 | End: 2024-03-05

## 2024-03-05 RX ORDER — OXYCODONE HCL 5 MG/5 ML
10 SOLUTION, ORAL ORAL
Status: DISCONTINUED | OUTPATIENT
Start: 2024-03-05 | End: 2024-03-05 | Stop reason: HOSPADM

## 2024-03-05 RX ORDER — ROCURONIUM BROMIDE 10 MG/ML
INJECTION, SOLUTION INTRAVENOUS PRN
Status: DISCONTINUED | OUTPATIENT
Start: 2024-03-05 | End: 2024-03-05 | Stop reason: SURG

## 2024-03-05 RX ORDER — EPHEDRINE SULFATE 50 MG/ML
INJECTION, SOLUTION INTRAVENOUS PRN
Status: DISCONTINUED | OUTPATIENT
Start: 2024-03-05 | End: 2024-03-05 | Stop reason: SURG

## 2024-03-05 RX ORDER — CEFAZOLIN SODIUM 1 G/3ML
INJECTION, POWDER, FOR SOLUTION INTRAMUSCULAR; INTRAVENOUS PRN
Status: DISCONTINUED | OUTPATIENT
Start: 2024-03-05 | End: 2024-03-05 | Stop reason: SURG

## 2024-03-05 RX ADMIN — Medication 200 MCG: at 17:11

## 2024-03-05 RX ADMIN — Medication 200 MCG: at 16:20

## 2024-03-05 RX ADMIN — ACETAMINOPHEN 650 MG: 325 TABLET, FILM COATED ORAL at 23:25

## 2024-03-05 RX ADMIN — SODIUM CHLORIDE, POTASSIUM CHLORIDE, SODIUM LACTATE AND CALCIUM CHLORIDE: 600; 310; 30; 20 INJECTION, SOLUTION INTRAVENOUS at 15:57

## 2024-03-05 RX ADMIN — SUGAMMADEX 200 MG: 100 INJECTION, SOLUTION INTRAVENOUS at 18:20

## 2024-03-05 RX ADMIN — ACETAMINOPHEN 650 MG: 325 TABLET, FILM COATED ORAL at 08:56

## 2024-03-05 RX ADMIN — Medication 100 MCG: at 17:04

## 2024-03-05 RX ADMIN — ROCURONIUM BROMIDE 50 MG: 50 INJECTION, SOLUTION INTRAVENOUS at 16:15

## 2024-03-05 RX ADMIN — Medication 100 MCG: at 16:19

## 2024-03-05 RX ADMIN — HYDROMORPHONE HYDROCHLORIDE 0.5 MG: 2 INJECTION INTRAMUSCULAR; INTRAVENOUS; SUBCUTANEOUS at 18:24

## 2024-03-05 RX ADMIN — ATORVASTATIN CALCIUM 80 MG: 80 TABLET, FILM COATED ORAL at 22:12

## 2024-03-05 RX ADMIN — CEFAZOLIN 2 G: 1 INJECTION, POWDER, FOR SOLUTION INTRAMUSCULAR; INTRAVENOUS at 16:15

## 2024-03-05 RX ADMIN — TRAMADOL HYDROCHLORIDE 50 MG: 50 TABLET ORAL at 11:12

## 2024-03-05 RX ADMIN — Medication 100 MCG: at 16:23

## 2024-03-05 RX ADMIN — PROPOFOL 100 MG: 10 INJECTION, EMULSION INTRAVENOUS at 16:15

## 2024-03-05 RX ADMIN — CARVEDILOL 6.25 MG: 6.25 TABLET, FILM COATED ORAL at 07:46

## 2024-03-05 RX ADMIN — LIDOCAINE HYDROCHLORIDE 100 MG: 20 INJECTION, SOLUTION EPIDURAL; INFILTRATION; INTRACAUDAL at 16:15

## 2024-03-05 RX ADMIN — HYDROMORPHONE HYDROCHLORIDE 0.5 MG: 2 INJECTION INTRAMUSCULAR; INTRAVENOUS; SUBCUTANEOUS at 18:08

## 2024-03-05 RX ADMIN — EPHEDRINE SULFATE 10 MG: 50 INJECTION, SOLUTION INTRAVENOUS at 17:11

## 2024-03-05 ASSESSMENT — PAIN DESCRIPTION - PAIN TYPE
TYPE: ACUTE PAIN

## 2024-03-05 ASSESSMENT — FIBROSIS 4 INDEX: FIB4 SCORE: 2.07

## 2024-03-05 NOTE — PROGRESS NOTES
Left humerus IMN  Left distal radius ORIF  Risks discussed at length including bleeding, infection, nerve injury, nonunion, malunion, need for additional surgery.      Of note, patient does have some parasthesias in radial nerve distribution on LUE. EPL intact    Mateus Peralta MD  Orthopedic Trauma Surgery

## 2024-03-05 NOTE — PROGRESS NOTES
Monitor Summary:   Rhythm: Afib  Rate: 66-97  Measurement: .-/.08/.-  Ectopy: o big, r pvc

## 2024-03-05 NOTE — PROGRESS NOTES
Assumed care of pt, received bedside report from night RN. Pt A/Ox3, disoriented to time, with forgetfulness. Fall and safety precautions in place. POC discussed with pt, pt verbalizes understanding. Hourly rounding in place.         Fall Risk Score: HIGH RISK  Fall risk interventions in place: Place yellow fall risk ID band on patient, Provide patient/family education based on risk assessment, Educate patient/family to call staff for assistance when getting out of bed, Place fall precaution signage outside patient door, Utilize bed/chair fall alarm, Notify charge of high risk for huddle, and Bed alarm connected correctly  Bed type: Regular and Waffle cushion (Pablo Score less than 17 interventions in place)  Patient on cardiac monitor: Yes  IVF/IV medications: Not Applicable   Oxygen: Room Air  Bedside sitter: Not Applicable   Isolation: Not applicable

## 2024-03-05 NOTE — PROGRESS NOTES
Patient left to procedure with transport and this acls RN on zoll monitor. Monitor room called and notified.    Progress Note - 57 Shona Yoon 13 y o  male MRN: [id number]  Unit/Bed#: AD  376-01 Encounter: [id number]  PT was seen for continuation of care  I reviewed records and discussed with staff  When I met with PT he perseverated that he could not stay in the unit for a week, that he wanted to go home that he was better  Denied suicidal thoughts and A/hallucinations improving  Encouraged to continue participating in tx and focus on one day at a time       Behavior over the last 24 hours:  unchanged  Sleep: normal  Appetite: normal  Medication side effects: No  ROS: no complaints    Medications:   Current Facility-Administered Medications   Medication Dose Route Frequency Provider Last Rate Last Admin    acetaminophen (TYLENOL) tablet 650 mg  650 mg Oral Q6H PRN Rosa Kaplan MD        aluminum-magnesium hydroxide-simethicone (MYLANTA) oral suspension 30 mL  30 mL Oral Q4H PRN Rosa Kaplan MD        artificial tear (LUBRIFRESH P M ) ophthalmic ointment 1 application  1 application Both Eyes R2A PRN Rosa Kaplan MD        bacitracin topical ointment 1 small application  1 small application Topical BID PRN Rosa Kaplan MD        haloperidol lactate (HALDOL) injection 2 5 mg  2 5 mg Intramuscular Q4H PRN Max 4/day Roas Kaplan MD        And    LORazepam (ATIVAN) injection 1 mg  1 mg Intramuscular Q4H PRN Max 4/day Rosa Kaplan MD        And    benztropine (COGENTIN) injection 0 5 mg  0 5 mg Intramuscular Q4H PRN Max 4/day Rosa Kaplan MD        haloperidol lactate (HALDOL) injection 5 mg  5 mg Intramuscular Q4H PRN Max 4/day Rosa Kaplan MD        And    LORazepam (ATIVAN) injection 2 mg  2 mg Intramuscular Q4H PRN Max 4/day Rosa Kaplan MD        And    benztropine (COGENTIN) injection 1 mg  1 mg Intramuscular Q4H PRN Max 4/day Rosa Kaplan MD        calcium carbonate (TUMS) chewable tablet 500 mg  500 mg Oral TID PRN Rosa Kaplan MD        [START ON 4/2/2022] FLUoxetine (PROzac) capsule 10 mg  10 mg Oral Daily Kieran Stoddard MD        hydrocortisone 1 % cream   Topical BID PRN Kieran Stoddard MD        hydrOXYzine HCL (ATARAX) tablet 25 mg  25 mg Oral Q6H PRN Max 4/day Kieran Stoddard MD        melatonin tablet 3 mg  3 mg Oral HS Kieran Stoddard MD        polyethylene glycol [facility]) packet 17 g  17 g Oral Daily PRN Kieran Stoddard MD        QUEtiapine (SEROquel) tablet 50 mg  50 mg Oral HS Kieran Stoddard MD        risperiDONE (RisperDAL M-TAB) disintegrating tablet 0 5 mg  0 5 mg Oral Q4H PRN Max 3/day Kieran Stoddard MD        risperiDONE (RisperDAL M-TAB) disintegrating tablet 1 mg  1 mg Oral Q4H PRN Max 6/day Kieran Stoddard MD        sodium chloride (OCEAN) 0 65 % nasal spray 1 spray  1 spray Each Nare BID PRN Kieran Stoddard MD        white petrolatum-mineral oil (EUCERIN,HYDROCERIN) cream   Topical TID PRN Kieran Stoddard MD         Medications Prior to Admission   Medication    aluminum chloride (DRYSOL) 20 % external solution    cetirizine (ZyrTEC) 10 mg tablet    Clindamycin Phos-Benzoyl Perox gel    sodium chloride (SALINE MIST) 0 65 % nasal spray    triamcinolone (KENALOG) 0 025 % ointment       Labs:   No visits with results within 1 Day(s) from this visit     Latest known visit with results is:   Admission on 03/31/2022, Discharged on 03/31/2022   Component Date Value    Amph/Meth UR 03/31/2022 Negative     Barbiturate Ur 03/31/2022 Negative     Benzodiazepine Urine 03/31/2022 Negative     Cocaine Urine 03/31/2022 Negative     Methadone Urine 03/31/2022 Negative     Opiate Urine 03/31/2022 Negative     PCP Ur 03/31/2022 Negative     THC Urine 03/31/2022 Negative     Oxycodone Urine 03/31/2022 Negative     EXTBreath Alcohol 03/31/2022 0 00     SARS-CoV-2 03/31/2022 Negative     INFLUENZA A PCR 03/31/2022 Negative     INFLUENZA B PCR 03/31/2022 Negative     RSV PCR 03/31/2022 Negative        Mental Status Evaluation:  Appearance:  age appropriate and casually dressed   Behavior:  cooperative,no abnormal movements  Speech:  normal pitch and normal volume   Mood:  constricted   Affect:  mood-congruent   Associations: perseverative   Thought Process:  perseverative    Thought Content:  No overt delusions   Perceptual Disturbances: reported improvement of A/hallucinations   Risk Potential: denied suicidal/homicidal thoughts or plans   Sensorium:  person and place   Memory recent and remote memory grossly intact   Consciousness:  alert    Attention: attention span appeared shorter than expected for age   Insight:  limited   Judgment: limited   Gait/Station: normal gait/station   Motor Activity: no abnormal movements     Progress Toward Goals:  Patient has been compliant with medication and has been interacting some in the milieu with staff and peers  Making progress    Assessment/Plan   Principal Problem:    MDD (major depressive disorder), single episode, severe with psychosis ([facility] [address] )  Active Problems:    Medical clearance for psychiatric admission  Medications:  Prozac 10 mg daily  Seroquel 50 mg at bedtime  Melatonin 3 mg at    Recommended Treatment: Continue with group therapy, milieu therapy and occupational therapy  Risks, benefits and possible side effects of Medications:   Risks, benefits, and possible side effects of medications explained to patient and patient verbalizes understanding  Counseling / Coordination of Care  Total floor / unit time spent today 20 minutes  Greater than 50% of total time was spent with the patient and / or family counseling and / or coordination of care  A description of the counseling / coordination of care:  Medication management and support to patient

## 2024-03-05 NOTE — PROGRESS NOTES
Assumed care of patient, bedside report received from Gayla GARZA. Updated on POC, call light within reach and fall precautions in place. Bed locked and in lowest position. Patient instructed to call for assistance before getting out of bed. All questions answered, no other needs at this time.       Fall Risk Score: HIGH RISK  Fall risk interventions in place: Place yellow fall risk ID band on patient, Provide patient/family education based on risk assessment, Educate patient/family to call staff for assistance when getting out of bed, Place fall precaution signage outside patient door, Utilize bed/chair fall alarm, and Bed alarm connected correctly  Bed type: Regular (Pablo Score less than 17 interventions in place)  Patient on cardiac monitor: Yes  IVF/IV medications: Not Applicable   Oxygen: Room Air  Bedside sitter: Not Applicable   Isolation: Not applicable

## 2024-03-05 NOTE — ANESTHESIA PREPROCEDURE EVALUATION
Case: [id number] Date/Time: 03/05/24 1500    Procedures:       INSERTION, INTRAMEDULLARY ILDEFONSO, HUMERUS (Left)      ORIF, FRACTURE, DISTAL RADIUS (Left)    [facility] / SURGERY [facility]    Surgeons: Mateus Peralta M.D.            Relevant Problems   CARDIAC   (positive) Atrial fibrillation with RVR (HCC)   (positive) Chronic systolic congestive heart failure (HCC)   (positive) Essential hypertension   (positive) Paroxysmal atrial fibrillation (HCC)      Other   (positive) AICD (automatic cardioverter/defibrillator) present   (positive) Bradycardia   (positive) Chronic anticoagulation   (positive) Fracture of radius   (positive) Left supracondylar humerus fracture, closed, initial encounter   (positive) Syncope and collapse       Physical Exam    Airway   Mallampati: II  TM distance: >3 FB  Neck ROM: full       Cardiovascular - normal exam  Rhythm: regular  Rate: normal  (-) murmur     Dental - normal exam           Pulmonary - normal exam  Breath sounds clear to auscultation     Abdominal    Neurological - normal exam                   Anesthesia Plan    ASA 4   ASA physical status 4 criteria: severe reduction of ejection fractions    Plan - general       Airway plan will be ETT          Induction: intravenous    Postoperative Plan: Postoperative administration of opioids is intended.    Pertinent diagnostic labs and testing reviewed    Informed Consent:    Anesthetic plan and risks discussed with patient.    Use of blood products discussed with: patient and spouse whom consented to blood products.            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   -Patient now more alert at times, though still lethargic/minimally responsive. Vocalizing but nothing discernable. Moves LE b/l and RUE.      Management as per primary team. 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   -Patient now more alert at times, though still lethargic/minimally responsive. Vocalizing but nothing discernable. Spontaneously moves his right side.     Management as per primary team. 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   -Patient now more alert at times, though still lethargic/minimally responsive to questions and commands.     Management as per primary team.

## 2024-03-05 NOTE — CARE PLAN
The patient is Stable - Low risk of patient condition declining or worsening    Shift Goals  Clinical Goals: ortho surg tomorrow  Patient Goals: pain control  Family Goals: na    Progress made toward(s) clinical / shift goals:      Problem: Fall Risk  Goal: Patient will remain free from falls  Outcome: Progressing  Note: Fall precautions in place, bed alarm on. Pt forgetful and requires reminders of fall precautions.      Problem: Pain - Standard  Goal: Alleviation of pain or a reduction in pain to the patient’s comfort goal  Outcome: Progressing  Note: Pt states he has pain in L arm with fractures, but declines medication intervention at this time. Rest and repositioning for comfort.       Patient is not progressing towards the following goals:

## 2024-03-05 NOTE — PROGRESS NOTES
At 0424 when RN entered room, pt began hallucinating that he could feel and see spiders were crawling on him. Pt spit pills out at RN thinking they were spiders in his mouth. Pt was not redirectable and became agitated. Vital signs taken at this time stable. No other neuro symptoms at this time. On call hospitalist Francesca notified of patient change. Medication ordered for agitation. Patient is now calm during conversation.

## 2024-03-05 NOTE — PROGRESS NOTES
Hospital Medicine Daily Progress Note    Date of Service  3/5/2024    Chief Complaint  Nolberto Severino is a 83 y.o. male admitted 3/4/2024 with     Hospital Course      83 female with past medical history of diabetes, A-fib on Eliquis, diabetes mellitus on insulin, HFrEF 25-30%, status post AICD presented after a fall.  Imaging noted with left humerus and radius fracture.  Orthopedics evaluated and is scheduled for surgery intervention    Interval Problem Update    3/5/2024    Patient seen and examined at bedside  Vital signs stable  Reports of left shoulder pain  Labs reviewed noted with white count 7.3, hemoglobin 10.3, close noted sodium 140, slightly raised./Creatinine.    Case discussed with orthopedics Dr. Peralta    Echocardiogram reviewed noted with left ventricular ejection fraction less than 30%.  Left humerus x-ray noted with Comminuted mildly displaced mid and distal humeral shaft fracture.       Telemetry monitoring for syncope  AICD interrogation was unremarkable   Continue on Coreg, Lipitor  Requiring IV narcotics for pain management    Repeat BMP in a.m. to monitor electrolytes and renal function  Repeat CBC in a.m. to monitor white count and hemoglobin  Requiring close monitoring for toxicity while on IV controlled substance    Scheduled for surgical intervention for left humerus and radius fracture        Patient has a high medical complexity, complex decision making and is at high risk of complication, morbidity and mortality       I have discussed this patient's plan of care and discharge plan at IDT rounds today with Case Management, Nursing, Nursing leadership, and other members of the IDT team.    Consultants/Specialty  orthopedics    Code Status  Full Code    Disposition  The patient is not medically cleared for discharge to home or a post-acute facility.  Anticipate discharge to: skilled nursing facility    I have placed the appropriate orders for post-discharge needs.    Review of  Systems  Review of Systems   Cardiovascular:  Negative for chest pain.   Musculoskeletal:  Positive for joint pain.        Physical Exam  Temp:  [35.9 °C (96.6 °F)-37.7 °C (99.8 °F)] 35.9 °C (96.6 °F)  Pulse:  [69-94] 94  Resp:  [16-18] 16  BP: (103-128)/(65-86) 119/67  SpO2:  [93 %-98 %] 97 %    Physical Exam  Constitutional:       Appearance: Normal appearance.   Cardiovascular:      Rate and Rhythm: Normal rate.      Pulses: Normal pulses.      Heart sounds: Normal heart sounds.   Pulmonary:      Effort: Pulmonary effort is normal.   Abdominal:      General: There is no distension.   Musculoskeletal:      Right lower leg: No edema.      Left lower leg: No edema.      Comments: On left arm sling, range of motion limited due to pain   Neurological:      General: No focal deficit present.      Mental Status: He is alert and oriented to person, place, and time.         Fluids    Intake/Output Summary (Last 24 hours) at 3/5/2024 1453  Last data filed at 3/5/2024 0600  Gross per 24 hour   Intake 100 ml   Output 550 ml   Net -450 ml       Laboratory  Recent Labs     03/04/24  0042 03/05/24  0203   WBC 9.9 7.3   RBC 3.71* 3.19*   HEMOGLOBIN 11.5* 10.3*   HEMATOCRIT 36.8* 30.4*   MCV 99.2* 95.3   MCH 31.0 32.3   MCHC 31.3* 33.9   RDW 59.7* 59.2*   PLATELETCT 198 182   MPV 9.9 10.4     Recent Labs     03/04/24  0042 03/05/24  0203   SODIUM 139 140   POTASSIUM 4.2 4.3   CHLORIDE 108 107   CO2 20 22   GLUCOSE 135* 131*   BUN 22 30*   CREATININE 1.10 1.37   CALCIUM 7.8* 8.5     Recent Labs     03/04/24  0042   APTT 30.9   INR 1.50*               Imaging  EC-ECHOCARDIOGRAM COMPLETE W/O CONT   Final Result      DX-HUMERUS 2+ LEFT   Final Result      Comminuted mildly displaced mid and distal humeral shaft fracture.      OUTSIDE IMAGES-CT CHEST/ABDOMEN/PELVIS   Final Result      OUTSIDE IMAGES-CT CERVICAL SPINE   Final Result      OUTSIDE IMAGES-CT HEAD   Final Result      DX-PORTABLE FLUORO > 1 HOUR    (Results Pending)    DX-HUMERUS 2+ LEFT    (Results Pending)   DX-WRIST-COMPLETE 3+ LEFT    (Results Pending)        Assessment/Plan  * Left supracondylar humerus fracture, closed, initial encounter- (present on admission)  Assessment & Plan  X-ray outside facility shows left humerus fracture following fall.  EDP discussed with Ortho who will evaluate the patient  Tramadol ordered for pain, morphine because significant hypotension  PT, OT    Anemia- (present on admission)  Assessment & Plan  Unclear etiology, hemoglobin 11.5 on presentation here his baseline based on previous labs is 15  No signs of GI bleeding  Possible cause of current symptoms today however, patient says he has recurrent episodes of syncope/presyncope  CBC ordered to continue monitoring    Atrial fibrillation with RVR ([facility])- (present on admission)  Assessment & Plan  Patient is in and out of RVR on evaluation.  He is hypertensive on presentation but will continue his carvedilol with BP parameters  Continue close monitoring on telemetry.  Digoxin or amiodarone if he goes into sustained A-fib due to his blood pressure lability  Hold apixaban due to possible surgery until after Ortho evaluation    Hypotension due to drugs- (present on admission)  Assessment & Plan  Blood pressure decreased to outside facility following morphine administration  Improved with IV fluids  Patient does have a history of recurrent syncopal/presyncopal events likely due to his CHF meds  Continue close monitoring on telemetry    Fracture of radius- (present on admission)  Assessment & Plan  X-ray at outside facility showed left radius fracture.  Ortho consulted    Encounter for preoperative assessment- (present on admission)  Assessment & Plan  Admit to:    Telemetry with continuous pulse oximetry due to: age 65 or older with history of: cardiac arrhythmias and CHF    Cardiovascular:   Patient has history of CHF: Continue home beta blocker and rate control medications.   Pre-op EKG:  Yes    Pulmonary:  Oxygen per protocol    GI:   No history of cirrhosis. Standard bowel regimen. Hold for loose stools.    Renal:   IV fluids: No fluids - risk of fluid overload      Musculoskeletal:   Check 25 OH vitamin D level. If 31-40 pg/mL, consider starting vitamin D3 1000 IU PO daily. If 20-30 pg/mL, consider vitamin D3 2000 IU PO daily. If <20 pg/mL, vitamin D2 50,000 IU weekly x 8 weeks then 2000 IU PO daily.    Neurologic:   Pain control: tramadol    Hematologic:  Plan on pharmacologic DVT prophylaxis post operative day #1. Hold for decreasing hemoglobin. Notify provider for hemoglobin less than 8.  Order preoperative type and cross.   Hgb every 6 hours if high bleeding risk.   If patient was on anticoagulation prior to arrival risks and benefits will be weighed by teams including surgery, hospitalist, geriatrics, and anesthesia for delaying surgery more than 24 hours.   On anticoagulation prior to arrival: Yes: Significant preoperative anemia or INR greater than 1.5, draw hgb/hct every 4 hours for 24 hours, INR daily for 2 days.     Medical Assessment Risk:  High    Surgical Risk:   High      Chronic systolic congestive heart failure (HCC)- (present on admission)  Assessment & Plan  CHF with an EF of 25-30% and reduced RV function.  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  N.p.o. now, fluid and salt restriction when he has a diet  He is on carvedilol, spironolactone, Entresto.  Patient has recurrent episodes of dizziness and syncope/presyncope and was hypotensive on presentation  Will hold spironolactone and Entresto, will need medication dose reductions/discontinuation    Syncope and collapse- (present on admission)  Assessment & Plan  Recurrent syncopal and presyncopal events, patient says he has frequent falls due to dizziness  He was found to be hypotensive at outside facility and on presentation here which improved  with IV fluids  He has CHF with an EF of 25-35% and RV dysfunction needs close hemodynamic monitoring to avoid volume overload as he already has bilateral lower extremity pitting edema  Will continue with carvedilol due to A-fib, will hold spironolactone and Entresto at this time  Patient also found to be anemic with a hemoglobin of 11.5 however there are no signs of bleeding  He also has a history of A-fib and is in and out of RVR on my evaluation which may also be contributing    3/5/2024  AICD interrogation was unremarkable          VTE prophylaxis: For surgery, resume Eliquis after surgery    I have performed a physical exam and reviewed and updated ROS and Plan today (3/5/2024). In review of yesterday's note (3/4/2024), there are no changes except as documented above.

## 2024-03-05 NOTE — PROGRESS NOTES
Hospitalist progress note  83 y M with fall and arm fractures.  NPO at midnight, plan for OR tomorrow.  Continue supportive care.  Hx dizzy spells, CHF 25%. Echo ordered.  Monitor on telemetry.

## 2024-03-06 LAB
ANION GAP SERPL CALC-SCNC: 16 MMOL/L (ref 7–16)
BASOPHILS # BLD AUTO: 0.1 % (ref 0–1.8)
BASOPHILS # BLD: 0.01 K/UL (ref 0–0.12)
BUN SERPL-MCNC: 32 MG/DL (ref 8–22)
CALCIUM SERPL-MCNC: 8.7 MG/DL (ref 8.5–10.5)
CHLORIDE SERPL-SCNC: 105 MMOL/L (ref 96–112)
CO2 SERPL-SCNC: 18 MMOL/L (ref 20–33)
CREAT SERPL-MCNC: 1 MG/DL (ref 0.5–1.4)
EOSINOPHIL # BLD AUTO: 0.01 K/UL (ref 0–0.51)
EOSINOPHIL NFR BLD: 0.1 % (ref 0–6.9)
ERYTHROCYTE [DISTWIDTH] IN BLOOD BY AUTOMATED COUNT: 59 FL (ref 35.9–50)
FERRITIN SERPL-MCNC: 108 NG/ML (ref 22–322)
GFR SERPLBLD CREATININE-BSD FMLA CKD-EPI: 75 ML/MIN/1.73 M 2
GLUCOSE BLD STRIP.AUTO-MCNC: 107 MG/DL (ref 65–99)
GLUCOSE BLD STRIP.AUTO-MCNC: 158 MG/DL (ref 65–99)
GLUCOSE BLD STRIP.AUTO-MCNC: 173 MG/DL (ref 65–99)
GLUCOSE BLD STRIP.AUTO-MCNC: 181 MG/DL (ref 65–99)
GLUCOSE BLD STRIP.AUTO-MCNC: 98 MG/DL (ref 65–99)
GLUCOSE SERPL-MCNC: 116 MG/DL (ref 65–99)
HCT VFR BLD AUTO: 27.5 % (ref 42–52)
HGB BLD-MCNC: 9.1 G/DL (ref 14–18)
IMM GRANULOCYTES # BLD AUTO: 0.03 K/UL (ref 0–0.11)
IMM GRANULOCYTES NFR BLD AUTO: 0.3 % (ref 0–0.9)
IRON SATN MFR SERPL: 12 % (ref 15–55)
IRON SERPL-MCNC: 28 UG/DL (ref 50–180)
LYMPHOCYTES # BLD AUTO: 1.68 K/UL (ref 1–4.8)
LYMPHOCYTES NFR BLD: 18.4 % (ref 22–41)
MCH RBC QN AUTO: 31.7 PG (ref 27–33)
MCHC RBC AUTO-ENTMCNC: 33.1 G/DL (ref 32.3–36.5)
MCV RBC AUTO: 95.8 FL (ref 81.4–97.8)
MONOCYTES # BLD AUTO: 1.36 K/UL (ref 0–0.85)
MONOCYTES NFR BLD AUTO: 14.9 % (ref 0–13.4)
NEUTROPHILS # BLD AUTO: 6.04 K/UL (ref 1.82–7.42)
NEUTROPHILS NFR BLD: 66.2 % (ref 44–72)
NRBC # BLD AUTO: 0 K/UL
NRBC BLD-RTO: 0 /100 WBC (ref 0–0.2)
PLATELET # BLD AUTO: 159 K/UL (ref 164–446)
PMV BLD AUTO: 10 FL (ref 9–12.9)
POTASSIUM SERPL-SCNC: 4.4 MMOL/L (ref 3.6–5.5)
RBC # BLD AUTO: 2.87 M/UL (ref 4.7–6.1)
SODIUM SERPL-SCNC: 139 MMOL/L (ref 135–145)
TIBC SERPL-MCNC: 226 UG/DL (ref 250–450)
UIBC SERPL-MCNC: 198 UG/DL (ref 110–370)
WBC # BLD AUTO: 9.1 K/UL (ref 4.8–10.8)

## 2024-03-06 PROCEDURE — A9270 NON-COVERED ITEM OR SERVICE: HCPCS | Performed by: STUDENT IN AN ORGANIZED HEALTH CARE EDUCATION/TRAINING PROGRAM

## 2024-03-06 PROCEDURE — 82962 GLUCOSE BLOOD TEST: CPT | Mod: 91

## 2024-03-06 PROCEDURE — 700102 HCHG RX REV CODE 250 W/ 637 OVERRIDE(OP): Performed by: STUDENT IN AN ORGANIZED HEALTH CARE EDUCATION/TRAINING PROGRAM

## 2024-03-06 PROCEDURE — 85025 COMPLETE CBC W/AUTO DIFF WBC: CPT

## 2024-03-06 PROCEDURE — 36415 COLL VENOUS BLD VENIPUNCTURE: CPT

## 2024-03-06 PROCEDURE — 99233 SBSQ HOSP IP/OBS HIGH 50: CPT | Performed by: STUDENT IN AN ORGANIZED HEALTH CARE EDUCATION/TRAINING PROGRAM

## 2024-03-06 PROCEDURE — 97535 SELF CARE MNGMENT TRAINING: CPT

## 2024-03-06 PROCEDURE — 82728 ASSAY OF FERRITIN: CPT

## 2024-03-06 PROCEDURE — 97163 PT EVAL HIGH COMPLEX 45 MIN: CPT

## 2024-03-06 PROCEDURE — 770020 HCHG ROOM/CARE - TELE (206)

## 2024-03-06 PROCEDURE — 83550 IRON BINDING TEST: CPT

## 2024-03-06 PROCEDURE — 80048 BASIC METABOLIC PNL TOTAL CA: CPT

## 2024-03-06 PROCEDURE — 83540 ASSAY OF IRON: CPT

## 2024-03-06 PROCEDURE — 97166 OT EVAL MOD COMPLEX 45 MIN: CPT

## 2024-03-06 RX ORDER — DEXTROSE MONOHYDRATE 25 G/50ML
25 INJECTION, SOLUTION INTRAVENOUS
Status: DISCONTINUED | OUTPATIENT
Start: 2024-03-06 | End: 2024-03-09 | Stop reason: HOSPADM

## 2024-03-06 RX ADMIN — CARVEDILOL 6.25 MG: 6.25 TABLET, FILM COATED ORAL at 17:51

## 2024-03-06 RX ADMIN — TRAMADOL HYDROCHLORIDE 50 MG: 50 TABLET ORAL at 08:56

## 2024-03-06 RX ADMIN — CARVEDILOL 6.25 MG: 6.25 TABLET, FILM COATED ORAL at 08:34

## 2024-03-06 RX ADMIN — SACUBITRIL AND VALSARTAN 1 TABLET: 24; 26 TABLET, FILM COATED ORAL at 17:52

## 2024-03-06 RX ADMIN — APIXABAN 5 MG: 5 TABLET, FILM COATED ORAL at 17:51

## 2024-03-06 RX ADMIN — INSULIN GLARGINE-YFGN 15 UNITS: 100 INJECTION, SOLUTION SUBCUTANEOUS at 17:58

## 2024-03-06 RX ADMIN — ATORVASTATIN CALCIUM 80 MG: 80 TABLET, FILM COATED ORAL at 21:00

## 2024-03-06 ASSESSMENT — COGNITIVE AND FUNCTIONAL STATUS - GENERAL
SUGGESTED CMS G CODE MODIFIER MOBILITY: CM
STANDING UP FROM CHAIR USING ARMS: TOTAL
WALKING IN HOSPITAL ROOM: TOTAL
TURNING FROM BACK TO SIDE WHILE IN FLAT BAD: A LOT
TOILETING: TOTAL
PERSONAL GROOMING: A LOT
DRESSING REGULAR LOWER BODY CLOTHING: TOTAL
MOVING FROM LYING ON BACK TO SITTING ON SIDE OF FLAT BED: TOTAL
HELP NEEDED FOR BATHING: TOTAL
CLIMB 3 TO 5 STEPS WITH RAILING: TOTAL
MOVING TO AND FROM BED TO CHAIR: TOTAL
MOBILITY SCORE: 7
DAILY ACTIVITIY SCORE: 8
DRESSING REGULAR UPPER BODY CLOTHING: TOTAL
SUGGESTED CMS G CODE MODIFIER DAILY ACTIVITY: CM
EATING MEALS: A LOT

## 2024-03-06 ASSESSMENT — GAIT ASSESSMENTS: GAIT LEVEL OF ASSIST: UNABLE TO PARTICIPATE

## 2024-03-06 ASSESSMENT — PAIN DESCRIPTION - PAIN TYPE
TYPE: ACUTE PAIN
TYPE: ACUTE PAIN

## 2024-03-06 ASSESSMENT — ACTIVITIES OF DAILY LIVING (ADL): TOILETING: INDEPENDENT

## 2024-03-06 ASSESSMENT — CHA2DS2 SCORE
CHF OR LEFT VENTRICULAR DYSFUNCTION: YES
HYPERTENSION: NO
SEX: MALE
CHA2DS2 VASC SCORE: 3
VASCULAR DISEASE: NO
AGE 75 OR GREATER: YES
DIABETES: NO
AGE 65 TO 74: NO
PRIOR STROKE OR TIA OR THROMBOEMBOLISM: NO

## 2024-03-06 ASSESSMENT — FIBROSIS 4 INDEX: FIB4 SCORE: 2.37

## 2024-03-06 NOTE — PROGRESS NOTES
Hospital Medicine Daily Progress Note    Date of Service  3/6/2024    Chief Complaint  Nolberto Severino is a 83 y.o. male admitted 3/4/2024 with fall    Hospital Course      83 female with past medical history of diabetes, A-fib on Eliquis, diabetes mellitus on insulin, HFrEF 25-30%, status post AICD presented after a fall.  Imaging noted with left humerus and radius fracture.  Repeat echocardiogram with EF around 30%, AICD interrogation unremarkable.  S/p  Open reduction internal fixation left humeral shaft with intramedullary nail and internal fixation left intra-articular distal radius fracture on 3/5 .  PT/OT recommending SNF    Interval Problem Update      3/6/2024  Patient seen and examined at bedside.  He is alert   Reports of left shoulder pain    Labs reviewed hemoglobin 9.1, sodium 139, bicarbonate 18      Telemetry monitoring for syncope  Continue on Coreg, Lipitor  Eliquis resumed  Requiring IV narcotics for pain management    Repeat BMP in a.m. to monitor electrolytes and renal function  Repeat CBC in a.m. to monitor white count and hemoglobin  Requiring close monitoring for toxicity while on IV controlled substance      Patient has a high medical complexity, complex decision making and is at high risk of complication, morbidity and mortality .      I have discussed this patient's plan of care and discharge plan at IDT rounds today with Case Management, Nursing, Nursing leadership, and other members of the IDT team.    Consultants/Specialty  orthopedics    Code Status  Full Code    Disposition  The patient is medically cleared for discharge to home or a post-acute facility.  Anticipate discharge to: skilled nursing facility    I have placed the appropriate orders for post-discharge needs.    Review of Systems  Review of Systems   Cardiovascular:  Negative for chest pain.   Musculoskeletal:  Positive for joint pain.        Physical Exam  Temp:  [35.9 °C (96.6 °F)-37.3 °C (99.1 °F)] 37.3 °C (99.1  °F)  Pulse:  [] 82  Resp:  [12-20] 16  BP: ()/(52-91) 117/74  SpO2:  [90 %-100 %] 96 %    Physical Exam  Constitutional:       Appearance: Normal appearance.   Cardiovascular:      Rate and Rhythm: Normal rate.      Pulses: Normal pulses.      Heart sounds: Normal heart sounds.   Pulmonary:      Effort: Pulmonary effort is normal.   Abdominal:      General: There is no distension.   Musculoskeletal:      Right lower leg: No edema.      Left lower leg: No edema.      Comments: On left arm sling, range of motion limited due to pain   Neurological:      General: No focal deficit present.      Mental Status: He is alert and oriented to person, place, and time.         Fluids    Intake/Output Summary (Last 24 hours) at 3/6/2024 1431  Last data filed at 3/6/2024 0014  Gross per 24 hour   Intake --   Output 450 ml   Net -450 ml       Laboratory  Recent Labs     03/04/24  0042 03/05/24  0203 03/06/24  0205   WBC 9.9 7.3 9.1   RBC 3.71* 3.19* 2.87*   HEMOGLOBIN 11.5* 10.3* 9.1*   HEMATOCRIT 36.8* 30.4* 27.5*   MCV 99.2* 95.3 95.8   MCH 31.0 32.3 31.7   MCHC 31.3* 33.9 33.1   RDW 59.7* 59.2* 59.0*   PLATELETCT 198 182 159*   MPV 9.9 10.4 10.0     Recent Labs     03/04/24  0042 03/05/24  0203 03/06/24  0205   SODIUM 139 140 139   POTASSIUM 4.2 4.3 4.4   CHLORIDE 108 107 105   CO2 20 22 18*   GLUCOSE 135* 131* 116*   BUN 22 30* 32*   CREATININE 1.10 1.37 1.00   CALCIUM 7.8* 8.5 8.7     Recent Labs     03/04/24  0042   APTT 30.9   INR 1.50*               Imaging  DX-PORTABLE FLUORO > 1 HOUR   Final Result      Portable fluoroscopy utilized for 3 minutes.         INTERPRETING LOCATION: [address] [place] [address], [id number]      DX-HUMERUS 2+ LEFT   Final Result      Fluoroscopic image(s) obtained during left humeral ORIF. Surgical hardware projects appropriately. Please see the patient's chart for full procedural details.      Fluoroscopy time 2 minutes 28 seconds.      Fluoroscopy dose(DAP): 2 Gy*cm^2      DX-WRIST-COMPLETE  3+ LEFT   Final Result      Fluoroscopic image(s) obtained during left wrist instrumentation. Surgical hardware projects appropriately. Please see the patient's chart for full procedural details.      Fluoroscopy time 32 seconds.      Fluoroscopy dose(DAP): 0.27 Gy*cm^2      EC-ECHOCARDIOGRAM COMPLETE W/O CONT   Final Result      DX-HUMERUS 2+ LEFT   Final Result      Comminuted mildly displaced mid and distal humeral shaft fracture.      OUTSIDE IMAGES-CT CHEST/ABDOMEN/PELVIS   Final Result      OUTSIDE IMAGES-CT CERVICAL SPINE   Final Result      OUTSIDE IMAGES-CT HEAD   Final Result           Assessment/Plan  * Left supracondylar humerus fracture, closed, initial encounter- (present on admission)  Assessment & Plan  S/p  Open reduction internal fixation left humeral shaft with intramedullary nail and internal fixation left intra-articular distal radius fracture on 3/5  Pain management  PT/OT recommending SNF   assisting    Anemia- (present on admission)  Assessment & Plan  Unclear etiology, hemoglobin 11.5 on presentation here his baseline based on previous labs is 15  No signs of GI bleeding  Possible cause of current symptoms today however, patient says he has recurrent episodes of syncope/presyncope  CBC ordered to continue monitoring    Atrial fibrillation with RVR ([facility])- (present on admission)  Assessment & Plan  Rate controlled  Continue on Coreg, Eliquis    Hypotension due to drugs- (present on admission)  Assessment & Plan  Blood pressure stable  Continue to monitor    Fracture of radius- (present on admission)  Assessment & Plan  X-ray at outside facility showed left radius fracture.  Ortho consulted    Encounter for preoperative assessment- (present on admission)  Assessment & Plan  S/p  Open reduction internal fixation left humeral shaft with intramedullary nail and internal fixation left intra-articular distal radius fracture on 3/5    Chronic systolic congestive heart failure (HCC)-  (present on admission)  Assessment & Plan  Euvolemic on exam  Repeat echo with ejection fraction around 30%  Continue home medication    Syncope and collapse- (present on admission)  Assessment & Plan  Multifactorial  Repeat echocardiogram with ejection fraction around 30%  AICD interrogation was unremarkable          VTE prophylaxis: on eliquis   I have performed a physical exam and reviewed and updated ROS and Plan today (3/6/2024). In review of yesterday's note (3/5/2024), there are no changes except as documented above.         Greater than 51 minutes spent preparing to see patient (e.g. review of tests) obtaining and/or reviewing separately obtained history. Performing a medically appropriate examination and/ evaluation.  Counseling and educating the patient/family/caregiver.  Ordering medications, tests, or procedures.  Referring and communicating with other health care professionals.  Documenting clinical information in EPIC.  Independently interpreting results and communicating results to patient/family/caregiver.  Care coordination.

## 2024-03-06 NOTE — OP REPORT
DATE OF OPERATION: 3/5/2024     PREOPERATIVE DIAGNOSIS:   Comminuted displaced humeral shaft fracture left  Comminuted intra-articular left distal radius fracture    POSTOPERATIVE DIAGNOSIS: Same    PROCEDURE PERFORMED:   Open reduction internal fixation left humeral shaft with intramedullary nail  Open reduction internal fixation left intra-articular distal radius fracture    SURGEON: Mateus Peralta M.D.     ASSISTANT: Moises Carrillo MD, fellow    ANESTHESIA: General    SPECIMEN: None    ESTIMATED BLOOD LOSS: 30 mL    IMPLANTS:   Lalit 8 mm humeral nail  Lalit distal radius volar locking plate      INDICATIONS: The patient is a 83 y.o. male who presented with above injuries.  I discussed the risks and benefits of the procedure which include but are not limited to risks of infection, wound healing complication, neurovascular injury, pain, malunion, non-union, malrotation, and the medical risks of anesthesia including MI, stroke, and death.  Alternatives to surgery were also discussed, including non-operative management, which I did not recommend.  The patient was in agreement with the plan to proceed, and the informed consent was signed and documented.  I met with the patient pre-operatively and marked the operative extremity with their agreement.  We proceeded to the operating room.     DESCRIPTION OF PROCEDURE:  Patient was seen in the preoperative holding area on the day of surgery. The operative site was marked with my initials.  he was taken to the operating room and placed supine on the operative table.  Anesthesia was induced.  The operative extremity was prepped and draped in the normal sterile fashion.  Operative pause was conducted and the correct patient, site, side, procedure, and surgeon's initials on extremity were identified.  We began with a standard anterior lateral approach to the proximal humerus.  Deltoid raphae was identified and split.  Underlying cuff was again split in line with the  incision.  We placed our guidewire just lateral to the tip of the acromion.  This was checked in AP and lateral views.  Once appropriate position was advanced.  We then used entry reamer again entering the canal take care to avoid injury to the rotator cuff.  We then placed our ball-tipped guidewire distal to fracture the elbow.  This was measured.  We then placed our nail into position.  Using the jig we drilled and placed multiple unicortical proximal interlock screws.  Then drilled and placed 2 additional distal interlock screws using perfect [place] technique.  After nail was secured the fracture site was evaluated intraoperative fluoroscopy noted to be mildly comminuted and slightly displaced overall length alignment rotation was restored.  Deltoid attachment showed slight abduction deformity which is typical for this type of fracture.  We did attempt percutaneous screw to help prevent further displacement of this insertion however due to the comminution the screw did not hold the deformity.  We decided reduction was reasonable and acceptable.  Wounds were thoroughly irrigated sterile saline and closed in layered fashion.  Proximal incision we closed to close the rotator interval.  Sterile dressings were applied.  We then placed our attention on the distal radius.  Standard volar approach to the distal radius was performed.  FCR tendon was released and retracted ulnarly as was the FPL tendon.  Pronator quadratus was noted to be injured.  The remaining muscle belly was released off the distal radius.  There is comminuted intra-articular fracture noted.  Using slight traction and volar pressure in the large cortical fragment was reduced.  This was then held provisionally with a radial styloid K wire.  We then placed our plate in position.  This was checked in AP and lateral views.  Once in appropriate position the volar tilt and radial height were restored we drilled and placed a single nonlocking screw into the  oblong hole in the shaft.  Next we drilled and placed distal nonlocking screw to allow for some compression of plate to bone and restore volar tilt.  Again this was done checked in AP and lateral views.  Once were satisfied we drilled and placed multiple distal locking screws followed by several additional shaft nonlocking screws.  Final images were obtained again appropriate reduction implant position.  Wound was irrigated sterile saline closed in layered fashion.  Sterile dressings placed placed in well-padded volar slab splint.  This placed back in a sling he was awoken taken to PACU in stable condition.    POSTOPERATIVE PLAN: Nonweightbearing left upper extremity weight bearing.  Mobilize with physical and occupational therapies.    The patient will follow up in clinic in 2 weeks to check wounds and remove sutures/staples.      ____________________________________   Mateus Peralta M.D.   DD: 3/5/2024  6:09 PM

## 2024-03-06 NOTE — THERAPY
Occupational Therapy   Initial Evaluation     Patient Name: Nolberto Severino  Age:  83 y.o., Sex:  male  Medical Record #: [id number]  Today's Date: 3/6/2024     Precautions  Precautions: (P) Fall Risk, Non Weight Bearing Left Upper Extremity, Sling Left Upper Extremity    Assessment    Patient is 83 y.o. male admitted following GLF down a flight of stairs sustained left humerus and distal radius fractures now s/p ORIF left humeral shaft with intramedullary nail and ORIF of intra-articular distal radius fracture (NWB LUE). Pt states normally independent with functional mobility and ADLs living in a SLH with spouse who is at bedside and able to assist. Pt required total assist for bed mobility and ADLs due to pain pt unable to attempt stand due to intense pain and dizziness BP monitored WFL at EOB returned to supine with total assist. Will continue to see for skilled therapy while admitted as well as recommend post-acute placement.      Plan    Occupational Therapy Initial Treatment Plan   Treatment Interventions: (P) Self Care / Activities of Daily Living, Adaptive Equipment, Community Re-Integration, Manual Therapy Techniques, Neuro Re-Education / Balance, Therapeutic Exercises, Therapeutic Activity  Treatment Frequency: (P) 3 Times per Week  Duration: (P) Until Therapy Goals Met    DC Equipment Recommendations: (P) Unable to determine at this time  Discharge Recommendations: (P) Recommend post-acute placement for additional occupational therapy services prior to discharge home     Objective       03/06/24 0958   Prior Living Situation   Prior Services None   Housing / Facility 1 Story House   Steps Into Home 5   Steps In Home   (FOS to basement)   Bathroom Set up Walk In Shower;Grab Bars;Shower Chair   Equipment Owned 4-Wheel Walker;Front-Wheel Walker;Tub / Shower Seat;Grab Bar(s) In Tub / Shower;Grab Bar(s) By Toilet   Lives with - Patient's Self Care Capacity Spouse   Prior Level of ADL Function   Self Feeding  Independent   Grooming / Hygiene Independent   Bathing Independent   Dressing Independent   Toileting Independent   Prior Level of IADL Function   Medication Management Independent   Laundry Independent   Kitchen Mobility Independent   Finances Independent   Home Management Independent   Shopping Independent   Prior Level Of Mobility Independent With Device in Home   Driving / Transportation Relatives / Others Provide Transportation   Occupation (Pre-Hospital Vocational) Retired Due To Age   History of Falls   History of Falls Yes   Date of Last Fall   (reason for admission)   Precautions   Precautions Fall Risk;Non Weight Bearing Left Upper Extremity;Sling Left Upper Extremity   Pain 0 - 10 Group   Therapist Pain Assessment Post Activity Pain Same as Prior to Activity;Nurse Notified   Cognition    Cognition / Consciousness X   Speech/ Communication Hard of Hearing;Delayed Responses   Orientation Level Not Oriented to Place   Level of Consciousness Alert   Ability To Follow Commands 1 Step   New Learning Impaired   Attention Impaired   Sequencing Impaired   Initiation Impaired   Comments pleasant, cooperative, delay with responses more confused at EOB and subsequent   Active ROM Upper Body   Active ROM Upper Body  X   Dominant Hand Right   Comments RUE WFL, LUE not assessed due to pain   Sensation Upper Body   Upper Extremity Sensation  Not Tested  (unable to accurately test due to cognition)   Balance Assessment   Sitting Balance (Static) Fair -   Sitting Balance (Dynamic) Poor +   Weight Shift Sitting Poor   Bed Mobility    Supine to Sit Total Assist   Sit to Supine Total Assist   Scooting Total Assist   Rolling Maximum Assist to Lt.   ADL Assessment   Eating Total Assist   Grooming Maximal Assist   Upper Body Dressing Total Assist   Lower Body Dressing Total Assist   How much help from another person does the patient currently need...   Putting on and taking off regular lower body clothing? 1   Bathing (including  washing, rinsing, and drying)? 1   Toileting, which includes using a toilet, bedpan, or urinal? 1   Putting on and taking off regular upper body clothing? 1   Taking care of personal grooming such as brushing teeth? 2   Eating meals? 2   6 Clicks Daily Activity Score 8   Functional Mobility   Sit to Stand Unable to Participate   Bed, Chair, Wheelchair Transfer Unable to Participate   Mobility EOB only   Activity Tolerance   Sitting Edge of Bed 8 min   Short Term Goals   Short Term Goal # 1 Pt will complete ADL transfers with supervision   Short Term Goal # 2 Pt will complete UB dressing with supervision   Short Term Goal # 3 Pt will complete toileting with supervision   Education Group   Education Provided Role of Occupational Therapist;Weight Bearing Precautions   Role of Occupational Therapist Patient Response Patient;Family;Acceptance;Explanation   Weight Bearing Precautions Patient Response Patient;Family;Acceptance;Explanation;Reinforcement Needed   Occupational Therapy Initial Treatment Plan    Treatment Interventions Self Care / Activities of Daily Living;Adaptive Equipment;Community Re-Integration;Manual Therapy Techniques;Neuro Re-Education / Balance;Therapeutic Exercises;Therapeutic Activity   Treatment Frequency 3 Times per Week   Duration Until Therapy Goals Met   Problem List   Problem List Decreased Active Daily Living Skills;Decreased Homemaking Skills;Decreased Functional Mobility;Decreased Activity Tolerance;Decreased Upper Extremity AROM Left;Decreased Upper Extremity Strength Left;Decreased Upper Extremity PROM Left;Impaired Postural Control / Balance;Impaired Cognitive Function;Safety Awareness Deficits / Cognition   Anticipated Discharge Equipment and Recommendations   DC Equipment Recommendations Unable to determine at this time   Discharge Recommendations Recommend post-acute placement for additional occupational therapy services prior to discharge home   Interdisciplinary Plan of Care  Collaboration   IDT Collaboration with  Nursing;Physical Therapist;Family / Caregiver   Patient Position at End of Therapy In Bed;Bed Alarm On;Call Light within Reach;Tray Table within Reach;Phone within Reach   Collaboration Comments RN updated

## 2024-03-06 NOTE — ANESTHESIA TIME REPORT
Anesthesia Start and Stop Event Times       Date Time Event    3/5/2024 1557 Ready for Procedure     1607 Anesthesia Start     1838 Anesthesia Stop          Responsible Staff  03/05/24      Name Role Begin End    Tobey Gansert, M.D. Anesth 1607 1838          Overtime Reason:  no overtime (within assigned shift)    Comments:

## 2024-03-06 NOTE — PROGRESS NOTES
Orthopedic PA Progress Note    Interval changes:  Patient doing well postop.  LUE splint and dressings are CDI  NWB LUE in sling  No pending ortho procedures  Follow up with the [facility] 2 weeks postop.  Cleared for DC from orthopedic standpoint pending therapy recs and medical optimization    ROS - Patient denies any new issues.  Denies any numbness or tingling. Pain well controlled.    /78   Pulse 96   Temp 37 °C (98.6 °F) (Temporal)   Resp 18   Ht 1.829 m (6')   Wt 81 kg (178 lb 9.2 oz)   SpO2 97%     Patient seen and examined  No acute distress  Breathing non labored  RRR  LUE: Splint and dressings CDI. Significant edema, difficulty with motion. Sensation intact. Cap refill <2s.     Recent Labs     03/04/24  0042 03/05/24  0203 03/06/24  0205   WBC 9.9 7.3 9.1   RBC 3.71* 3.19* 2.87*   HEMOGLOBIN 11.5* 10.3* 9.1*   HEMATOCRIT 36.8* 30.4* 27.5*   MCV 99.2* 95.3 95.8   MCH 31.0 32.3 31.7   MCHC 31.3* 33.9 33.1   RDW 59.7* 59.2* 59.0*   PLATELETCT 198 182 159*   MPV 9.9 10.4 10.0       Active Hospital Problems    Diagnosis     Chronic systolic congestive heart failure (HCC) [I50.22]      Priority: High     November 2022: Echocardiogram with normal LV size, mild concentric LVH, LVEF 25%. Mildly dilated LA and RV, enlarged RA. Moderate MR, mild TR. RVSP 20mmHg.      Left supracondylar humerus fracture, closed, initial encounter [S42.412A]     Encounter for preoperative assessment [Z01.818]     Fracture of radius [S52.90XA]     Hypotension due to drugs [I95.2]     Atrial fibrillation with RVR (HCC) [I48.91]     Anemia [D64.9]     Syncope and collapse [R55]        Assessment/Plan:  Patient doing well postop.  LUE splint and dressings are CDI  NWB LUE in sling  No pending ortho procedures  Follow up with the [facility] 2 weeks postop.  Cleared for DC from orthopedic standpoint pending therapy recs and medical optimization    POD#1 S/p  Open reduction internal fixation left humeral  shaft with intramedullary nail  Open reduction internal fixation left intra-articular distal radius fracture  Wt bearing status - NWB LUE in sling  Wound care/Drains - Dressings to be left in place  Future Procedures - None planned   Lovenox: Start 3/6, Duration-until ambulatory > 150'  Sutures/Staples out- 14-21 days post operatively. Removal will completed by ortho [facility]'s unless transferred.  PT/OT-initiated  Antibiotics:  Perioperative completed  DVT Prophylaxis- TEDS/SCDs/Foot pumps  Gilliam-not needed per ortho  Case Coordination for Discharge Planning - Disposition per therapy recs.

## 2024-03-06 NOTE — DISCHARGE PLANNING
-1100  Per SW, SARITHA sent [facility] referrals + [facility].

## 2024-03-06 NOTE — PROGRESS NOTES
Pod 1 s/p left humerus IMN and distal radius orif  [id number] SWARTZ at this point  PT shannon Peralta MD  Orthopedic Trauma Surgery

## 2024-03-06 NOTE — ANESTHESIA POSTPROCEDURE EVALUATION
Patient: Nolberto Severino    Procedure Summary       Date: 03/05/24 Room / Location: [place] [id number] / SURGERY [facility]    Anesthesia Start: 1607 Anesthesia Stop: 1838    Procedures:       OPEN REDUCTION INTERNAL FIXATION LEFT HUMERUS  WITH INTRAMEDULLARY NAIL (Left: Arm Upper)      ORIF, FRACTURE, LEFT DISTAL RADIUS (Left: Arm Lower) Diagnosis: (Left Humerus and Left Distal Radius Fracture)    Surgeons: Mateus Peralta M.D. Responsible Provider: Tobey Gansert, M.D.    Anesthesia Type: general ASA Status: 4            Final Anesthesia Type: general  Last vitals  BP   Blood Pressure : 119/67    Temp   35.9 °C (96.6 °F)    Pulse   94   Resp   16    SpO2   97 %      Anesthesia Post Evaluation    Patient location during evaluation: PACU  Patient participation: complete - patient participated  Level of consciousness: awake and alert    Airway patency: patent  Anesthetic complications: no  Cardiovascular status: hemodynamically stable  Respiratory status: acceptable  Hydration status: euvolemic    PONV: none          No notable events documented.     Nurse Pain Score: 7 (NPRS)

## 2024-03-06 NOTE — THERAPY
"Physical Therapy   Initial Evaluation     Patient Name: Nolberto Severino  Age:  83 y.o., Sex:  male  Medical Record #: [id number]  Today's Date: 3/6/2024     Precautions  Precautions: Fall Risk;Non Weight Bearing Left Upper Extremity;Immobilizer Left Upper Extremity    Assessment  Patient is 83 y.o. male admitted after fall sustaining L humeral shaft fx and L distal radius fx s/p  ORIF on 3/5. PMHx of afib, HFrEF, ICD, DM and DLD. Pt educated on WB restrictions with good demo. Pt requiring total A for bed mobility and unable to attempt standing due to dizziness/lightheadedness at EOB and hypotension. Pt presents with pain, weakness, impaired cognition, balance, activity tolerance and endurance. Recommend placement as pt below his baseline function. Will continue to follow to address said deficits.     Plan    Physical Therapy Initial Treatment Plan   Treatment Plan : Bed Mobility, Equipment, Gait Training, Neuro Re-Education / Balance, Orthotics Training , Self Care / Home Evaluation, Stair Training, Therapeutic Activities, Therapeutic Exercise  Treatment Frequency: 4 Times per Week  Duration: Until Therapy Goals Met    DC Equipment Recommendations: Unable to determine at this time  Discharge Recommendations: Recommend post-acute placement for additional physical therapy services prior to discharge home (PM&R)        Subjective    \"I feel very dizzy.\" Sitting at EOB     Objective     03/06/24 0955   Vitals   Blood Pressure    (supine 112/62, ; seated 110/54, 90, returned supine 92/61, ; 2 min supine 98/61, HR 89. Pt with significant c/o dizziness/lighteadedness at EOB, decreased level of arousal, improved once returned supine. RN aware)   O2 Delivery Device None - Room Air   Pain 0 - 10 Group   Therapist Pain Assessment Post Activity Pain Same as Prior to Activity;Nurse Notified  (L shoulder and L hip pain not rated, agreeable to mobility)   Prior Living Situation   Housing / Facility 1 Story " House  (with basement)   Steps Into Home 5   Steps In Home   (1 flight to basement)   Rail   (need to clarify)   Bathroom Set up Walk In Shower;Grab Bars;Shower Chair   Equipment Owned 4-Wheel Walker;Front-Wheel Walker   Lives with - Patient's Self Care Capacity Spouse   Comments Wife retired surgical RN and able to assist as needed   Prior Level of Functional Mobility   Bed Mobility Independent   Transfer Status Independent   Ambulation Independent   Ambulation Distance   (community)   Assistive Devices Used 4-Wheel Walker   Stairs Independent   History of Falls   History of Falls Yes   Date of Last Fall   (reason for admit)   Cognition    Cognition / Consciousness X   Speech/ Communication Hard of Hearing;Delayed Responses   Orientation Level Not Oriented to Place  (unable to answer orientation questions)   Level of Consciousness Alert   Ability To Follow Commands 1 Step   New Learning Impaired   Attention Impaired   Sequencing Impaired   Initiation Impaired   Comments Pt pleasant and cooperative. Noted delay in responses/command following, often requiring repetition. Pt unable to answer orientation questions, wife reporting pt's cognition without issue at home   Active ROM Upper Body   Active ROM Upper Body  X   Comments LUE not assessed due to restrictions   Strength Upper Body   Comments same as above, LUE in sling. RUE WDL   Active ROM Lower Body    Active ROM Lower Body  WDL   Strength Lower Body   Comments unable to formally assess due to impaired command following, grossly 3+/5   Balance Assessment   Sitting Balance (Static) Fair -   Sitting Balance (Dynamic) Poor +   Weight Shift Sitting Poor   Comments EOB only   Bed Mobility    Supine to Sit Total Assist   Sit to Supine Total Assist   Scooting Total Assist   Rolling Moderate Assist to Lt.   Comments use of bed rails and HOB raised   Gait Analysis   Gait Level Of Assist Unable to Participate   Functional Mobility   Sit to Stand Unable to Participate    Bed, Chair, Wheelchair Transfer Unable to Participate   Mobility EOB only   Comments standing deferred 2/2 low BP, significant dizziness/lightheadedness   6 Clicks Assessment - How much HELP from from another person do you currently need... (If the patient hasn't done an activity recently, how much help from another person do you think he/she would need if he/she tried?)   Turning from your back to your side while in a flat bed without using bedrails? 2   Moving from lying on your back to sitting on the side of a flat bed without using bedrails? 1   Moving to and from a bed to a chair (including a wheelchair)? 1   Standing up from a chair using your arms (e.g., wheelchair, or bedside chair)? 1   Walking in hospital room? 1   Climbing 3-5 steps with a railing? 1   6 clicks Mobility Score 7   Activity Tolerance   Comments limited by lightheadedness ,dizziness   Patient / Family Goals    Patient / Family Goal #1 to have less pain   Short Term Goals    Short Term Goal # 1 Pt will perform supine <> sit with Michael in 6 visits to get in/out of bed   Short Term Goal # 2 Pt will perform STS with hemiwalker and Michael in 6 visits to improve functional independence   Short Term Goal # 3 Pt will perform stand step transfer with Michael and hemiwalker in 6 visits to get in/out of chair   Short Term Goal # 4 Pt will ambulate 50ft with hemiwalker and Michael in 6 visits to initiate gait training   Education Group   Education Provided Role of Physical Therapist;Weight Bearing Status   Role of Physical Therapist Patient Response Patient;Family;Acceptance;Explanation;Verbal Demonstration   Weight Bearing Status Patient Response Patient;Family;Acceptance;Explanation;Verbal Demonstration;Action Demonstration   Additional Comments Pt receptive to self management and compensatory strategies with mobility, will require reinforcement   Physical Therapy Initial Treatment Plan    Treatment Plan  Bed Mobility;Equipment;Gait Training;Neuro  Re-Education / Balance;Orthotics Training ;Self Care / Home Evaluation;Stair Training;Therapeutic Activities;Therapeutic Exercise   Treatment Frequency 4 Times per Week   Duration Until Therapy Goals Met   Problem List    Problems Pain;Impaired Bed Mobility;Impaired Transfers;Impaired Ambulation;Functional ROM Deficit;Functional Strength Deficit;Impaired Balance;Impaired Coordination;Decreased Activity Tolerance;Safety Awareness Deficits / Cognition;Motor Planning / Sequencing   Anticipated Discharge Equipment and Recommendations   DC Equipment Recommendations Unable to determine at this time   Discharge Recommendations Recommend post-acute placement for additional physical therapy services prior to discharge home   Interdisciplinary Plan of Care Collaboration   IDT Collaboration with  Nursing;Occupational Therapist;Family / Caregiver   Patient Position at End of Therapy In Bed;Bed Alarm On;Call Light within Reach;Tray Table within Reach;Phone within Reach;Family / Friend in Room   Collaboration Comments RN updated   Session Information   Date / Session Number  3/6- 1 (1/4, 3/12)

## 2024-03-06 NOTE — ANESTHESIA PROCEDURE NOTES
Airway    Date/Time: 3/5/2024 4:15 PM    Performed by: Tobey Gansert, M.D.  Authorized by: Tobey Gansert, M.D.    Location:  OR  Urgency:  Elective  Indications for Airway Management:  Anesthesia      Spontaneous Ventilation: absent    Sedation Level:  Deep  Preoxygenated: Yes    Patient Position:  Sniffing  Final Airway Type:  Endotracheal airway  Final Endotracheal Airway:  ETT  Cuffed: Yes    Technique Used for Successful ETT Placement:  Direct laryngoscopy    Insertion Site:  Oral  Blade Type:  Rodrigo  Laryngoscope Blade/Videolaryngoscope Blade Size:  3  ETT Size (mm):  8.0  Measured from:  Gums  Placement Verified by: auscultation and capnometry    Cormack-Lehane Classification:  Grade I - full view of glottis  Number of Attempts at Approach:  1

## 2024-03-06 NOTE — PROGRESS NOTES
4 Eyes Skin Assessment Completed by GREG Liu and CHERRI Desouza.    Head WDL  Ears Redness and Blanching  Nose WDL  Mouth WDL  Neck WDL  Breast/Chest WDL  Shoulder Blades Redness and Blanching  Spine WDL  (R) Arm/Elbow/Hand Redness and Blanching  (L) Arm/Elbow/Hand Redness, Blanching, and Swelling- ORIF site, splint and sling in place  Abdomen WDL  Groin WDL  Scrotum/Coccyx/Buttocks Redness and Blanching  (R) Leg Bruising  (L) Leg Scab  (R) Heel/Foot/Toe Redness, Blanching, and Boggy  (L) Heel/Foot/Toe Redness, Blanching, and Boggy          Devices In Places ECG, Tele Box, Blood Pressure Cuff, Pulse Ox, and SCD's      Interventions In Place Heel Mepilex, Pillows, and Heels Loaded W/Pillows    Possible Skin Injury No    Pictures Uploaded Into Epic N/A  Wound Consult Placed N/A  RN Wound Prevention Protocol Ordered No

## 2024-03-06 NOTE — CARE PLAN
The patient is Watcher - Medium risk of patient condition declining or worsening    Shift Goals  Clinical Goals: ortho surg, pain control, safety  Patient Goals: pain control  Family Goals: na    Progress made toward(s) clinical / shift goals:  Pt in procedure for ORIF and IMN, pt had aicd interrogated, echo resulted at 25%.      Problem: Knowledge Deficit - Standard  Goal: Patient and family/care givers will demonstrate understanding of plan of care, disease process/condition, diagnostic tests and medications  Outcome: Progressing     Problem: Fall Risk  Goal: Patient will remain free from falls  Outcome: Progressing       Patient is not progressing towards the following goals:

## 2024-03-06 NOTE — CARE PLAN
The patient is Stable - Low risk of patient condition declining or worsening    Shift Goals  Clinical Goals: VSS/postop vitals, pain control  Patient Goals: Rest, get better  Family Goals: Get better    Progress made toward(s) clinical / shift goals:  Arrived back to unit from PACU around 1955. No complaints of pain. PRN tylenol administered once for discomfort. Patient experienced bladder discomfort around 2300- post void scan resulted in 337cc and one time order received to straight cath. Patient refused AM medications stating that he was too tired and cannot take them.    Problem: Pain - Standard  Goal: Alleviation of pain or a reduction in pain to the patient’s comfort goal  Outcome: Progressing     Problem: Knowledge Deficit - Standard  Goal: Patient and family/care givers will demonstrate understanding of plan of care, disease process/condition, diagnostic tests and medications  Outcome: Progressing

## 2024-03-07 PROBLEM — I95.9 HYPOTENSION: Status: ACTIVE | Noted: 2024-03-07

## 2024-03-07 LAB
ANION GAP SERPL CALC-SCNC: 12 MMOL/L (ref 7–16)
ANION GAP SERPL CALC-SCNC: 9 MMOL/L (ref 7–16)
BARCODED ABORH UBTYP: 6200
BARCODED PRD CODE UBPRD: NORMAL
BARCODED UNIT NUM UBUNT: NORMAL
BASOPHILS # BLD AUTO: 0 % (ref 0–1.8)
BASOPHILS # BLD AUTO: 0.1 % (ref 0–1.8)
BASOPHILS # BLD AUTO: 0.1 % (ref 0–1.8)
BASOPHILS # BLD: 0 K/UL (ref 0–0.12)
BASOPHILS # BLD: 0.01 K/UL (ref 0–0.12)
BASOPHILS # BLD: 0.01 K/UL (ref 0–0.12)
BUN SERPL-MCNC: 29 MG/DL (ref 8–22)
BUN SERPL-MCNC: 31 MG/DL (ref 8–22)
CALCIUM SERPL-MCNC: 7.9 MG/DL (ref 8.5–10.5)
CALCIUM SERPL-MCNC: 8.4 MG/DL (ref 8.5–10.5)
CHLORIDE SERPL-SCNC: 106 MMOL/L (ref 96–112)
CHLORIDE SERPL-SCNC: 108 MMOL/L (ref 96–112)
CO2 SERPL-SCNC: 20 MMOL/L (ref 20–33)
CO2 SERPL-SCNC: 21 MMOL/L (ref 20–33)
COMPONENT R 8504R: NORMAL
CORTIS SERPL-MCNC: 14.9 UG/DL (ref 0–23)
CREAT SERPL-MCNC: 0.81 MG/DL (ref 0.5–1.4)
CREAT SERPL-MCNC: 0.85 MG/DL (ref 0.5–1.4)
EOSINOPHIL # BLD AUTO: 0 K/UL (ref 0–0.51)
EOSINOPHIL # BLD AUTO: 0.02 K/UL (ref 0–0.51)
EOSINOPHIL # BLD AUTO: 0.04 K/UL (ref 0–0.51)
EOSINOPHIL NFR BLD: 0 % (ref 0–6.9)
EOSINOPHIL NFR BLD: 0.3 % (ref 0–6.9)
EOSINOPHIL NFR BLD: 0.6 % (ref 0–6.9)
ERYTHROCYTE [DISTWIDTH] IN BLOOD BY AUTOMATED COUNT: 56.9 FL (ref 35.9–50)
ERYTHROCYTE [DISTWIDTH] IN BLOOD BY AUTOMATED COUNT: 58.4 FL (ref 35.9–50)
ERYTHROCYTE [DISTWIDTH] IN BLOOD BY AUTOMATED COUNT: 59 FL (ref 35.9–50)
GFR SERPLBLD CREATININE-BSD FMLA CKD-EPI: 86 ML/MIN/1.73 M 2
GFR SERPLBLD CREATININE-BSD FMLA CKD-EPI: 88 ML/MIN/1.73 M 2
GLUCOSE BLD STRIP.AUTO-MCNC: 125 MG/DL (ref 65–99)
GLUCOSE BLD STRIP.AUTO-MCNC: 131 MG/DL (ref 65–99)
GLUCOSE BLD STRIP.AUTO-MCNC: 141 MG/DL (ref 65–99)
GLUCOSE BLD STRIP.AUTO-MCNC: 194 MG/DL (ref 65–99)
GLUCOSE SERPL-MCNC: 169 MG/DL (ref 65–99)
GLUCOSE SERPL-MCNC: 187 MG/DL (ref 65–99)
HCT VFR BLD AUTO: 23.8 % (ref 42–52)
HCT VFR BLD AUTO: 24.5 % (ref 42–52)
HCT VFR BLD AUTO: 25.8 % (ref 42–52)
HGB BLD-MCNC: 8 G/DL (ref 14–18)
HGB BLD-MCNC: 8.2 G/DL (ref 14–18)
HGB BLD-MCNC: 9 G/DL (ref 14–18)
IMM GRANULOCYTES # BLD AUTO: 0.02 K/UL (ref 0–0.11)
IMM GRANULOCYTES # BLD AUTO: 0.03 K/UL (ref 0–0.11)
IMM GRANULOCYTES # BLD AUTO: 0.03 K/UL (ref 0–0.11)
IMM GRANULOCYTES NFR BLD AUTO: 0.3 % (ref 0–0.9)
IMM GRANULOCYTES NFR BLD AUTO: 0.4 % (ref 0–0.9)
IMM GRANULOCYTES NFR BLD AUTO: 0.4 % (ref 0–0.9)
LACTATE SERPL-SCNC: 1 MMOL/L (ref 0.5–2)
LYMPHOCYTES # BLD AUTO: 0.9 K/UL (ref 1–4.8)
LYMPHOCYTES # BLD AUTO: 1.34 K/UL (ref 1–4.8)
LYMPHOCYTES # BLD AUTO: 1.41 K/UL (ref 1–4.8)
LYMPHOCYTES NFR BLD: 11.9 % (ref 22–41)
LYMPHOCYTES NFR BLD: 18.4 % (ref 22–41)
LYMPHOCYTES NFR BLD: 20.2 % (ref 22–41)
MCH RBC QN AUTO: 31.7 PG (ref 27–33)
MCH RBC QN AUTO: 32.3 PG (ref 27–33)
MCH RBC QN AUTO: 33.1 PG (ref 27–33)
MCHC RBC AUTO-ENTMCNC: 33.5 G/DL (ref 32.3–36.5)
MCHC RBC AUTO-ENTMCNC: 33.6 G/DL (ref 32.3–36.5)
MCHC RBC AUTO-ENTMCNC: 34.9 G/DL (ref 32.3–36.5)
MCV RBC AUTO: 94.6 FL (ref 81.4–97.8)
MCV RBC AUTO: 94.9 FL (ref 81.4–97.8)
MCV RBC AUTO: 96 FL (ref 81.4–97.8)
MONOCYTES # BLD AUTO: 1.19 K/UL (ref 0–0.85)
MONOCYTES # BLD AUTO: 1.2 K/UL (ref 0–0.85)
MONOCYTES # BLD AUTO: 1.26 K/UL (ref 0–0.85)
MONOCYTES NFR BLD AUTO: 15.7 % (ref 0–13.4)
MONOCYTES NFR BLD AUTO: 17.2 % (ref 0–13.4)
MONOCYTES NFR BLD AUTO: 17.3 % (ref 0–13.4)
NEUTROPHILS # BLD AUTO: 4.3 K/UL (ref 1.82–7.42)
NEUTROPHILS # BLD AUTO: 4.65 K/UL (ref 1.82–7.42)
NEUTROPHILS # BLD AUTO: 5.45 K/UL (ref 1.82–7.42)
NEUTROPHILS NFR BLD: 61.6 % (ref 44–72)
NEUTROPHILS NFR BLD: 63.6 % (ref 44–72)
NEUTROPHILS NFR BLD: 71.9 % (ref 44–72)
NRBC # BLD AUTO: 0 K/UL
NRBC BLD-RTO: 0 /100 WBC (ref 0–0.2)
PLATELET # BLD AUTO: 131 K/UL (ref 164–446)
PLATELET # BLD AUTO: 132 K/UL (ref 164–446)
PLATELET # BLD AUTO: 154 K/UL (ref 164–446)
PMV BLD AUTO: 10.2 FL (ref 9–12.9)
PMV BLD AUTO: 9.8 FL (ref 9–12.9)
PMV BLD AUTO: 9.9 FL (ref 9–12.9)
POTASSIUM SERPL-SCNC: 3.9 MMOL/L (ref 3.6–5.5)
POTASSIUM SERPL-SCNC: 4 MMOL/L (ref 3.6–5.5)
PROCALCITONIN SERPL-MCNC: 0.35 NG/ML
PRODUCT TYPE UPROD: NORMAL
RBC # BLD AUTO: 2.48 M/UL (ref 4.7–6.1)
RBC # BLD AUTO: 2.59 M/UL (ref 4.7–6.1)
RBC # BLD AUTO: 2.72 M/UL (ref 4.7–6.1)
SODIUM SERPL-SCNC: 138 MMOL/L (ref 135–145)
SODIUM SERPL-SCNC: 138 MMOL/L (ref 135–145)
UNIT STATUS USTAT: NORMAL
WBC # BLD AUTO: 7 K/UL (ref 4.8–10.8)
WBC # BLD AUTO: 7.3 K/UL (ref 4.8–10.8)
WBC # BLD AUTO: 7.6 K/UL (ref 4.8–10.8)

## 2024-03-07 PROCEDURE — 80048 BASIC METABOLIC PNL TOTAL CA: CPT | Mod: 91

## 2024-03-07 PROCEDURE — 99222 1ST HOSP IP/OBS MODERATE 55: CPT | Performed by: INTERNAL MEDICINE

## 2024-03-07 PROCEDURE — 86923 COMPATIBILITY TEST ELECTRIC: CPT

## 2024-03-07 PROCEDURE — 84145 PROCALCITONIN (PCT): CPT

## 2024-03-07 PROCEDURE — A9270 NON-COVERED ITEM OR SERVICE: HCPCS | Performed by: STUDENT IN AN ORGANIZED HEALTH CARE EDUCATION/TRAINING PROGRAM

## 2024-03-07 PROCEDURE — 700105 HCHG RX REV CODE 258: Performed by: STUDENT IN AN ORGANIZED HEALTH CARE EDUCATION/TRAINING PROGRAM

## 2024-03-07 PROCEDURE — 30233N1 TRANSFUSION OF NONAUTOLOGOUS RED BLOOD CELLS INTO PERIPHERAL VEIN, PERCUTANEOUS APPROACH: ICD-10-PCS | Performed by: STUDENT IN AN ORGANIZED HEALTH CARE EDUCATION/TRAINING PROGRAM

## 2024-03-07 PROCEDURE — C9113 INJ PANTOPRAZOLE SODIUM, VIA: HCPCS | Mod: JZ | Performed by: STUDENT IN AN ORGANIZED HEALTH CARE EDUCATION/TRAINING PROGRAM

## 2024-03-07 PROCEDURE — 82962 GLUCOSE BLOOD TEST: CPT | Mod: 91

## 2024-03-07 PROCEDURE — 700102 HCHG RX REV CODE 250 W/ 637 OVERRIDE(OP): Performed by: STUDENT IN AN ORGANIZED HEALTH CARE EDUCATION/TRAINING PROGRAM

## 2024-03-07 PROCEDURE — 85025 COMPLETE CBC W/AUTO DIFF WBC: CPT

## 2024-03-07 PROCEDURE — P9016 RBC LEUKOCYTES REDUCED: HCPCS

## 2024-03-07 PROCEDURE — 83605 ASSAY OF LACTIC ACID: CPT

## 2024-03-07 PROCEDURE — 700111 HCHG RX REV CODE 636 W/ 250 OVERRIDE (IP): Mod: JZ | Performed by: STUDENT IN AN ORGANIZED HEALTH CARE EDUCATION/TRAINING PROGRAM

## 2024-03-07 PROCEDURE — 36430 TRANSFUSION BLD/BLD COMPNT: CPT

## 2024-03-07 PROCEDURE — 82533 TOTAL CORTISOL: CPT

## 2024-03-07 PROCEDURE — 36415 COLL VENOUS BLD VENIPUNCTURE: CPT

## 2024-03-07 PROCEDURE — 770020 HCHG ROOM/CARE - TELE (206)

## 2024-03-07 PROCEDURE — 99233 SBSQ HOSP IP/OBS HIGH 50: CPT | Performed by: STUDENT IN AN ORGANIZED HEALTH CARE EDUCATION/TRAINING PROGRAM

## 2024-03-07 RX ORDER — POLYETHYLENE GLYCOL 3350 17 G/17G
1 POWDER, FOR SOLUTION ORAL
Status: DISCONTINUED | OUTPATIENT
Start: 2024-03-07 | End: 2024-03-09 | Stop reason: HOSPADM

## 2024-03-07 RX ORDER — OMEPRAZOLE 20 MG/1
20 CAPSULE, DELAYED RELEASE ORAL DAILY
Status: DISCONTINUED | OUTPATIENT
Start: 2024-03-07 | End: 2024-03-07

## 2024-03-07 RX ORDER — FERROUS SULFATE 325(65) MG
325 TABLET ORAL
Status: DISCONTINUED | OUTPATIENT
Start: 2024-03-07 | End: 2024-03-09 | Stop reason: HOSPADM

## 2024-03-07 RX ORDER — HEPARIN SODIUM 5000 [USP'U]/ML
5000 INJECTION, SOLUTION INTRAVENOUS; SUBCUTANEOUS EVERY 8 HOURS
Status: DISCONTINUED | OUTPATIENT
Start: 2024-03-07 | End: 2024-03-07

## 2024-03-07 RX ORDER — AMOXICILLIN 250 MG
2 CAPSULE ORAL EVERY EVENING
Status: DISCONTINUED | OUTPATIENT
Start: 2024-03-07 | End: 2024-03-09 | Stop reason: HOSPADM

## 2024-03-07 RX ORDER — PANTOPRAZOLE SODIUM 40 MG/10ML
40 INJECTION, POWDER, LYOPHILIZED, FOR SOLUTION INTRAVENOUS 2 TIMES DAILY
Status: DISCONTINUED | OUTPATIENT
Start: 2024-03-07 | End: 2024-03-09 | Stop reason: HOSPADM

## 2024-03-07 RX ORDER — SODIUM CHLORIDE 9 MG/ML
500 INJECTION, SOLUTION INTRAVENOUS ONCE
Status: COMPLETED | OUTPATIENT
Start: 2024-03-07 | End: 2024-03-07

## 2024-03-07 RX ADMIN — DOCUSATE SODIUM 50 MG AND SENNOSIDES 8.6 MG 2 TABLET: 8.6; 5 TABLET, FILM COATED ORAL at 09:04

## 2024-03-07 RX ADMIN — MAGNESIUM HYDROXIDE 30 ML: 1200 LIQUID ORAL at 15:31

## 2024-03-07 RX ADMIN — TRAMADOL HYDROCHLORIDE 50 MG: 50 TABLET ORAL at 09:04

## 2024-03-07 RX ADMIN — PANTOPRAZOLE SODIUM 40 MG: 40 INJECTION, POWDER, LYOPHILIZED, FOR SOLUTION INTRAVENOUS at 15:31

## 2024-03-07 RX ADMIN — INSULIN GLARGINE-YFGN 15 UNITS: 100 INJECTION, SOLUTION SUBCUTANEOUS at 17:54

## 2024-03-07 RX ADMIN — FINASTERIDE 5 MG: 5 TABLET, FILM COATED ORAL at 05:23

## 2024-03-07 RX ADMIN — CARVEDILOL 6.25 MG: 6.25 TABLET, FILM COATED ORAL at 08:40

## 2024-03-07 RX ADMIN — SACUBITRIL AND VALSARTAN 1 TABLET: 24; 26 TABLET, FILM COATED ORAL at 05:23

## 2024-03-07 RX ADMIN — DAPAGLIFLOZIN 10 MG: 10 TABLET, FILM COATED ORAL at 05:23

## 2024-03-07 RX ADMIN — FERROUS SULFATE TAB 325 MG (65 MG ELEMENTAL FE) 325 MG: 325 (65 FE) TAB at 08:40

## 2024-03-07 RX ADMIN — APIXABAN 5 MG: 5 TABLET, FILM COATED ORAL at 05:23

## 2024-03-07 RX ADMIN — SODIUM CHLORIDE 500 ML: 9 INJECTION, SOLUTION INTRAVENOUS at 12:27

## 2024-03-07 RX ADMIN — DOCUSATE SODIUM 50 MG AND SENNOSIDES 8.6 MG 2 TABLET: 8.6; 5 TABLET, FILM COATED ORAL at 17:49

## 2024-03-07 RX ADMIN — ATORVASTATIN CALCIUM 80 MG: 80 TABLET, FILM COATED ORAL at 20:27

## 2024-03-07 RX ADMIN — CLOPIDOGREL BISULFATE 75 MG: 75 TABLET ORAL at 05:23

## 2024-03-07 RX ADMIN — POLYETHYLENE GLYCOL 3350 1 PACKET: 17 POWDER, FOR SOLUTION ORAL at 09:04

## 2024-03-07 ASSESSMENT — PAIN DESCRIPTION - PAIN TYPE
TYPE: ACUTE PAIN

## 2024-03-07 ASSESSMENT — FIBROSIS 4 INDEX: FIB4 SCORE: 2.44

## 2024-03-07 NOTE — PROGRESS NOTES
Orthopedic PA Progress Note    Interval changes:  Patient doing well.  LUE splint and dressings are CDI  NWB LUE in sling  Follow up with the [facility] trauma [facility] 2 weeks postop.  Cleared for DC from orthopedic standpoint pending medical optimization    ROS - Patient denies any new issues.  Denies any numbness or tingling. Pain well controlled.    BP (!) 85/41   Pulse 85   Temp 37 °C (98.6 °F) (Temporal)   Resp 16   Ht 1.829 m (6')   Wt 80.3 kg (177 lb 0.5 oz)   SpO2 96%     Patient seen and examined  No acute distress  Breathing non labored  RRR  LUE: Splint and dressings CDI. Minimal flexion and extension to fingers. Sensation intact. Cap refill <2s.     Recent Labs     03/05/24  0203 03/06/24  0205 03/07/24  0151   WBC 7.3 9.1 7.3   RBC 3.19* 2.87* 2.59*   HEMOGLOBIN 10.3* 9.1* 8.2*   HEMATOCRIT 30.4* 27.5* 24.5*   MCV 95.3 95.8 94.6   MCH 32.3 31.7 31.7   MCHC 33.9 33.1 33.5   RDW 59.2* 59.0* 58.4*   PLATELETCT 182 159* 154*   MPV 10.4 10.0 10.2       Active Hospital Problems    Diagnosis     Chronic systolic congestive heart failure (HCC) [I50.22]      Priority: High     November 2022: Echocardiogram with normal LV size, mild concentric LVH, LVEF 25%. Mildly dilated LA and RV, enlarged RA. Moderate MR, mild TR. RVSP 20mmHg.      Left supracondylar humerus fracture, closed, initial encounter [S42.412A]     Encounter for preoperative assessment [Z01.818]     Fracture of radius [S52.90XA]     Hypotension due to drugs [I95.2]     Atrial fibrillation with RVR (HCC) [I48.91]     Anemia [D64.9]     Syncope and collapse [R55]        Assessment/Plan:  Patient doing well.  LUE splint and dressings are CDI  NWB LUE in sling  Follow up with the [facility] trauma [facility] 2 weeks postop.  Cleared for DC from orthopedic standpoint pending medical optimization    POD#2 S/p  Open reduction internal fixation left humeral shaft with intramedullary nail  Open reduction internal fixation left intra-articular distal radius  fracture  Wt bearing status - NWB LUE in sling  Wound care/Drains - Dressings to be left in place to lower arm, ok to change shoulder dressings PRN with gauze/tegaderm  Future Procedures - None planned   Lovenox: Start 3/6, Duration-until ambulatory > 150'  Sutures/Staples out- 14-21 days post operatively. Removal will completed by ortho [facility]'s unless transferred.  PT/OT-initiated  Antibiotics:  Perioperative completed  DVT Prophylaxis- TEDS/SCDs/Foot pumps  Gilliam-not needed per ortho  Case Coordination for Discharge Planning - Disposition per therapy recs.

## 2024-03-07 NOTE — CARE PLAN
The patient is Stable - Low risk of patient condition declining or worsening    Shift Goals  Clinical Goals: pain control, hemodynamic stability  Patient Goals: sleep  Family Goals: SATISH    Progress made toward(s) clinical / shift goals:  PT OT DC planning       Problem: Knowledge Deficit - Standard  Goal: Patient and family/care givers will demonstrate understanding of plan of care, disease process/condition, diagnostic tests and medications  Outcome: Progressing     Problem: Skin Integrity  Goal: Skin integrity is maintained or improved  Outcome: Progressing     Problem: Fall Risk  Goal: Patient will remain free from falls  Outcome: Progressing     Problem: Pain - Standard  Goal: Alleviation of pain or a reduction in pain to the patient’s comfort goal  Outcome: Progressing       Patient is not progressing towards the following goals:

## 2024-03-07 NOTE — PROGRESS NOTES
Hospital Medicine Daily Progress Note    Date of Service  3/7/2024    Chief Complaint  Nolberto Severino is a 83 y.o. male admitted 3/4/2024 with fall    Hospital Course      83 female with past medical history of diabetes, A-fib on Eliquis, diabetes mellitus on insulin, HFrEF 25-30%, status post AICD presented after a fall.  Imaging noted with left humerus and radius fracture.  Repeat echocardiogram with EF around 30%, AICD interrogation unremarkable.  S/p  Open reduction internal fixation left humeral shaft with intramedullary nail and internal fixation left intra-articular distal radius fracture on 3/5 .  PT/OT recommending SNF.      Interval Problem Update      3/7/2024  Patient seen and examined bedside  Noted to have low blood pressure 80/48   Remained asymptomatic    Labs reviewed H&H trending down, chemistry unremarkable, iron panel consistent with iron deficiency anemia    Hemoglobin significantly trended down.  His baseline is around 15.  Currently there is no concern for bleeding.  Slight elevation in BUN.  He did have an bowel movement for 2 days, now on bowel protocol.     Suspecting GI bleed.  Starting on IV Protonix, holding Eliquis at this time as benefit outweighs risks..    Consulted GI Dr. Thomas for evaluation     Telemetry monitoring  Hold antihypertensive  Hold Eliquis  Started on ferrous sulfate  Blood pressure improved with IV bolus  Will transfuse 1 unit PRBC    Repeat BMP in a.m. to monitor electrolytes and renal function  Close H&H monitoring    Patient 'sspouse at bedside.  Updated treatment plan      Patient has a high medical complexity, complex decision making and is at high risk of complication, morbidity and mortality .      I have discussed this patient's plan of care and discharge plan at IDT rounds today with Case Management, Nursing, Nursing leadership, and other members of the IDT team.    Consultants/Specialty  orthopedics    Code Status  Full Code    Disposition  The  patient is not medically cleared for discharge to home or a post-acute facility.  Anticipate discharge to: skilled nursing facility    I have placed the appropriate orders for post-discharge needs.    Review of Systems  Review of Systems   Cardiovascular:  Negative for chest pain.   Musculoskeletal:  Positive for joint pain.        Physical Exam  Temp:  [36.5 °C (97.7 °F)-37 °C (98.6 °F)] 37 °C (98.6 °F)  Pulse:  [64-90] 85  Resp:  [16-18] 16  BP: ()/(41-88) 93/57  SpO2:  [92 %-99 %] 96 %    Physical Exam  Constitutional:       Appearance: Normal appearance.   Cardiovascular:      Rate and Rhythm: Normal rate.      Pulses: Normal pulses.      Heart sounds: Normal heart sounds.   Pulmonary:      Effort: Pulmonary effort is normal.   Abdominal:      General: There is no distension.   Musculoskeletal:      Right lower leg: No edema.      Left lower leg: No edema.      Comments: On left arm sling, range of motion limited due to pain   Neurological:      General: No focal deficit present.      Mental Status: He is alert and oriented to person, place, and time.         Fluids    Intake/Output Summary (Last 24 hours) at 3/7/2024 1332  Last data filed at 3/7/2024 0904  Gross per 24 hour   Intake 720 ml   Output 700 ml   Net 20 ml       Laboratory  Recent Labs     03/06/24  0205 03/07/24  0151 03/07/24  1243   WBC 9.1 7.3 7.6   RBC 2.87* 2.59* 2.48*   HEMOGLOBIN 9.1* 8.2* 8.0*   HEMATOCRIT 27.5* 24.5* 23.8*   MCV 95.8 94.6 96.0   MCH 31.7 31.7 32.3   MCHC 33.1 33.5 33.6   RDW 59.0* 58.4* 59.0*   PLATELETCT 159* 154* 132*   MPV 10.0 10.2 9.8     Recent Labs     03/06/24  0205 03/07/24  0151 03/07/24  1243   SODIUM 139 138 138   POTASSIUM 4.4 3.9 4.0   CHLORIDE 105 106 108   CO2 18* 20 21   GLUCOSE 116* 169* 187*   BUN 32* 31* 29*   CREATININE 1.00 0.81 0.85   CALCIUM 8.7 8.4* 7.9*                     Imaging  DX-PORTABLE FLUORO > 1 HOUR   Final Result      Portable fluoroscopy utilized for 3 minutes.          INTERPRETING LOCATION: [address] [place], [place] [address], [id number]      DX-HUMERUS 2+ LEFT   Final Result      Fluoroscopic image(s) obtained during left humeral ORIF. Surgical hardware projects appropriately. Please see the patient's chart for full procedural details.      Fluoroscopy time 2 minutes 28 seconds.      Fluoroscopy dose(DAP): 2 Gy*cm^2      DX-WRIST-COMPLETE 3+ LEFT   Final Result      Fluoroscopic image(s) obtained during left wrist instrumentation. Surgical hardware projects appropriately. Please see the patient's chart for full procedural details.      Fluoroscopy time 32 seconds.      Fluoroscopy dose(DAP): 0.27 Gy*cm^2      EC-ECHOCARDIOGRAM COMPLETE W/O CONT   Final Result      DX-HUMERUS 2+ LEFT   Final Result      Comminuted mildly displaced mid and distal humeral shaft fracture.      OUTSIDE IMAGES-CT CHEST/ABDOMEN/PELVIS   Final Result      OUTSIDE IMAGES-CT CERVICAL SPINE   Final Result      OUTSIDE IMAGES-CT HEAD   Final Result           Assessment/Plan  * Left supracondylar humerus fracture, closed, initial encounter- (present on admission)  Assessment & Plan  S/p  Open reduction internal fixation left humeral shaft with intramedullary nail and internal fixation left intra-articular distal radius fracture on 3/5  Pain management  PT/OT recommending SNF   assisting    Hypotension  Assessment & Plan  Blood pressure improved with bolus  Lactic acid within normal limit  Hold antihypertensive  Transfuse 1 unit PRBC  Monitor blood pressure closely     Anemia- (present on admission)  Assessment & Plan  Unclear etiology, hemoglobin 11.5 on presentation here his baseline based on previous labs is 15  No signs of GI bleeding  Possible cause of current symptoms today however, patient says he has recurrent episodes of syncope/presyncope    3/7/2024  Worsening anemia likely from surgical blood loss  Transfuse 1 PRBC  Started on iron supplement  Monitor CBC closely  Get occult blood stool test    Atrial  fibrillation with RVR (HCC)- (present on admission)  Assessment & Plan  Rate controlled      Hypotension due to drugs- (present on admission)  Assessment & Plan  Blood pressure stable  Continue to monitor    Fracture of radius- (present on admission)  Assessment & Plan  X-ray at outside facility showed left radius fracture.  Ortho consulted    Encounter for preoperative assessment- (present on admission)  Assessment & Plan  S/p  Open reduction internal fixation left humeral shaft with intramedullary nail and internal fixation left intra-articular distal radius fracture on 3/5    Chronic systolic congestive heart failure (HCC)- (present on admission)  Assessment & Plan  Euvolemic on exam  Repeat echo with ejection fraction around 30%  Continue home medication    Syncope and collapse- (present on admission)  Assessment & Plan  Multifactorial  Repeat echocardiogram with ejection fraction around 30%  AICD interrogation was unremarkable          VTE prophylaxis: Hold Eliquis there is concern for GI bleed.  I have performed a physical exam and reviewed and updated ROS and Plan today (3/7/2024). In review of yesterday's note (3/6/2024), there are no changes except as documented above.         Greater than 51 minutes spent preparing to see patient (e.g. review of tests) obtaining and/or reviewing separately obtained history. Performing a medically appropriate examination and/ evaluation.  Counseling and educating the patient/family/caregiver.  Ordering medications, tests, or procedures.  Referring and communicating with other health care professionals.  Documenting clinical information in EPIC.  Independently interpreting results and communicating results to patient/family/caregiver.  Care coordination.

## 2024-03-07 NOTE — DISCHARGE PLANNING
[phone]  DPA left name and number with  asking for a call back from admissions regarding SNF referral.

## 2024-03-07 NOTE — PROGRESS NOTES
Bedside report received from off going RN/tech: Akua GARZA, assumed care of patient.     Fall Risk Score: HIGH RISK  Fall risk interventions in place: Place yellow fall risk ID band on patient, Provide patient/family education based on risk assessment, Educate patient/family to call staff for assistance when getting out of bed, Place fall precaution signage outside patient door, Place patient in room close to nursing station, Utilize bed/chair fall alarm, Notify charge of high risk for huddle, and Bed alarm connected correctly  Bed type: Regular and Waffle cushion (Pablo Score less than 17 interventions in place)  Patient on cardiac monitor: Yes  IVF/IV medications: Not Applicable   Oxygen: Room Air  Bedside sitter: Not Applicable   Isolation: Not applicable

## 2024-03-07 NOTE — CARE PLAN
The patient is Stable - Low risk of patient condition declining or worsening    Shift Goals  Clinical Goals: pain control, hemodynamic stability  Patient Goals: sleep  Family Goals: SATISH    Progress made toward(s) clinical / shift goals:        Problem: Knowledge Deficit - Standard  Goal: Patient and family/care givers will demonstrate understanding of plan of care, disease process/condition, diagnostic tests and medications  Outcome: Progressing     Problem: Skin Integrity  Goal: Skin integrity is maintained or improved  Outcome: Progressing     Problem: Fall Risk  Goal: Patient will remain free from falls  Outcome: Progressing     Problem: Pain - Standard  Goal: Alleviation of pain or a reduction in pain to the patient’s comfort goal  Outcome: Progressing       Patient is not progressing towards the following goals:

## 2024-03-07 NOTE — DISCHARGE PLANNING
Case Management Discharge Planning    Admission Date: 3/4/2024  GMLOS: 4.2  ALOS: 3    6-Clicks ADL Score: 8  6-Clicks Mobility Score: 7  PT and/or OT Eval ordered: Yes  Post-acute Referrals Ordered: Yes  Post-acute Choice Obtained: Yes  Has referral(s) been sent to post-acute provider:  Yes      Anticipated Discharge Dispo: Discharge Disposition: D/T to SNF with Medicare cert in anticipation of skilled care (03)    DME Needed: No    Action(s) Taken: DC Assessment Complete (See below) and Choice obtained    Patient discussed in rounds, PT/OT recommending SNF placement. RN CM met with patient and wife at bedside to obtain SNF choice. Pt is A&O X2 so wife made choice for [facility].     Patient has been accepted at [facility], choice form sent to [facility]. Pt not medically cleared today due to hypotension.     Escalations Completed: None    Medically Clear: No    Next Steps: CM will continue to assist with DCP needs/barriers.    Barriers to Discharge: Medical clearance    Is the patient up for discharge tomorrow: No         Care Transition Team Assessment    Information Source  Orientation Level: Oriented to person, Oriented to place, Oriented to situation, Disoriented to time  Information Given By: Spouse  Informant's Name: Elidia  Who is responsible for making decisions for patient? : Spouse  Name(s) of Primary Decision Maker: Mere Brenner    Readmission Evaluation  Is this a readmission?: No    Elopement Risk  Legal Hold: No  Ambulatory or Self Mobile in Wheelchair: Yes  Disoriented: No  Psychiatric Symptoms: None  History of Wandering: No  Elopement this Admit: No  Vocalizing Wanting to Leave: No  Displays Behaviors, Body Language Wanting to Leave: No-Not at Risk for Elopement  Elopement Risk: Not at Risk for Elopement    Interdisciplinary Discharge Planning  Lives with - Patient's Self Care Capacity: Spouse  Patient or legal guardian wants to designate a caregiver: No  Support Systems: Family Member(s)  Housing /  Facility: [facility]  Prior Services: None  Durable Medical Equipment: Walker    Discharge Preparedness  What is your plan after discharge?: Skilled nursing facility  What are your discharge supports?: Spouse  Prior Functional Level: Ambulatory, Needs Assist with Activities of Daily Living, Needs Assist with Medication Management    Functional Assesment  Prior Functional Level: Ambulatory, Needs Assist with Activities of Daily Living, Needs Assist with Medication Management    Finances  Financial Barriers to Discharge: No  Prescription Coverage: Yes    Vision / Hearing Impairment  Vision Impairment : No  Hearing Impairment : No    Advance Directive  Advance Directive?: None    Domestic Abuse  Have you ever been the victim of abuse or violence?: No  Physical Abuse or Sexual Abuse: No  Verbal Abuse or Emotional Abuse: No  Possible Abuse/Neglect Reported to:: Not Applicable    Psychological Assessment  History of Substance Abuse: None  History of Psychiatric Problems: No    Discharge Risks or Barriers  Discharge risks or barriers?: Complex medical needs  Patient risk factors: Complex medical needs    Anticipated Discharge Information  Discharge Disposition: D/T to SNF with Medicare cert in anticipation of skilled care (03)

## 2024-03-07 NOTE — CONSULTS
Date of Consultation:  3/7/2024    Patient: : oNlberto Severino  MRN: [id number]    Referring Physician:  Dr Shultz     GI:Dylan Thomas M.D.     Reason for Consultation: Anemia    History of Present Illness:   83-year-old male on Eliquis with falling hemoglobin.  GI consulted for possible bleed.  Patient not having melena.  No hematemesis.  No GI bloody show.  The wife remembers the patient getting a colonoscopy in the [facility] last year.  She recalls him having some polyps.      Past Medical History:   Diagnosis Date    HFrEF (heart failure with reduced ejection fraction) ([facility]) 11/2022    Echocardiogram with normal LV size, mild concentric LVH, LVEF 25%. Mildly dilated LA and RV, enlarged RA. Moderate MR, mild TR. RVSP 20mmHg.    Myocardial infarct ([facility]) 07/2022    Adverse effect of anesthesia 2010    respiratory arrest 3x during surgery    Anesthesia     Bradycardia     Cataract     Chronic anticoagulation     Diabetes ([facility])     High cholesterol     Hypertension     Indigestion     Neuropathy     Paroxysmal atrial fibrillation ([facility])     Stroke ([facility])     Syncope        Past Surgical History:   Procedure Laterality Date    PB OPEN TX RADIAL & ULNAR SHAFT FX FIX RADIUS A* Left 3/5/2024    Procedure: ORIF, FRACTURE, LEFT DISTAL RADIUS;  Surgeon: Mateus Peralta M.D.;  Location: SURGERY [facility];  Service: Orthopedics    HUMERUS NAILING INTRAMEDULLARY Left 3/5/2024    Procedure: OPEN REDUCTION INTERNAL FIXATION LEFT HUMERUS  WITH INTRAMEDULLARY NAIL;  Surgeon: Mateus Peralta M.D.;  Location: SURGERY [facility];  Service: Orthopedics    AICD IMPLANT Left 01/09/2023    Upgrade to Elías Hensley DR [id number] implanted by Dr. Clay.    PACEMAKER INSERTION Left 02/08/2019    Medtronic Advisa SR MRI [id number] implanted in [place].    HIP REPLACEMENT, TOTAL Left     INTRAOCULAR LENS IMPLANT Bilateral        History reviewed. No pertinent family history.    Social History     Socioeconomic History    Marital status:     Tobacco Use    Smoking status: Former     Current packs/day: 0.00     Types: Cigarettes, Pipe     Quit date:      Years since quittin.2    Smokeless tobacco: Never   Vaping Use    Vaping Use: Never used   Substance and Sexual Activity    Alcohol use: Yes     Comment: rarely    Drug use: Never       Current Meds (name, sig, last dose):     Current Facility-Administered Medications:     ferrous sulfate    senna-docusate **AND** polyethylene glycol/lytes    magnesium hydroxide    pantoprazole    insulin regular **AND** POC blood glucose manual result **AND** NOTIFY MD and PharmD **AND** Administer 20 grams of glucose (approximately 8 ounces of fruit juice) every 15 minutes PRN FSBG less than 70 mg/dL **AND** dextrose bolus    [Held by provider] apixaban    [Held by provider] sacubitril-valsartan    clopidogrel    acetaminophen    atorvastatin    [Held by provider] carvedilol    dapagliflozin propanediol    finasteride    insulin GLARGINE      ROS  10 systems reviewed and are negative unless otherwise noted in history of present illness.    Physical Exam:  Vitals:    24 1210 24 1215 24 1305 24 1413   BP: (!) 84/46 (!) 80/48 93/57 93/55   Pulse:    87   Resp:    16   Temp:    36.9 °C (98.4 °F)   TempSrc:       SpO2:    99%   Weight:       Height:           Physical Exam  Vitals reviewed.   Constitutional:       General: He is not in acute distress.     Appearance: He is ill-appearing.   Cardiovascular:      Rate and Rhythm: Regular rhythm.   Pulmonary:      Breath sounds: Normal breath sounds.   Abdominal:      Palpations: Abdomen is soft.   Neurological:      Mental Status: He is alert. He is disoriented.           Labs:  Recent Labs     24  0205 24  0151 24  1243   SODIUM 139 138 138   POTASSIUM 4.4 3.9 4.0   CHLORIDE 105 106 108   CO2 18* 20 21   BUN 32* 31* 29*   CREATININE 1.00 0.81 0.85   CALCIUM 8.7 8.4* 7.9*     Recent Labs     24  0205  03/07/24  0151 03/07/24  1243   GLUCOSE 116* 169* 187*     Recent Labs     03/06/24  0205 03/07/24  0151 03/07/24  1243   WBC 9.1 7.3 7.6   NEUTSPOLYS 66.20 63.60 71.90   LYMPHOCYTES 18.40* 18.40* 11.90*   MONOCYTES 14.90* 17.30* 15.70*   EOSINOPHILS 0.10 0.30 0.00   BASOPHILS 0.10 0.10 0.10     Recent Labs     03/06/24  0205 03/07/24  0151 03/07/24  1243   RBC 2.87* 2.59* 2.48*   HEMOGLOBIN 9.1* 8.2* 8.0*   HEMATOCRIT 27.5* 24.5* 23.8*   PLATELETCT 159* 154* 132*   IRON 28*  --   --    FERRITIN 108.0  --   --    TOTIRONBC 226*  --   --      Recent Results (from the past 24 hour(s))   POCT glucose device results    Collection Time: 03/06/24  5:56 PM   Result Value Ref Range    POC Glucose, Blood 181 (H) 65 - 99 mg/dL   POCT glucose device results    Collection Time: 03/06/24  8:55 PM   Result Value Ref Range    POC Glucose, Blood 158 (H) 65 - 99 mg/dL   Basic Metabolic Panel    Collection Time: 03/07/24  1:51 AM   Result Value Ref Range    Sodium 138 135 - 145 mmol/L    Potassium 3.9 3.6 - 5.5 mmol/L    Chloride 106 96 - 112 mmol/L    Co2 20 20 - 33 mmol/L    Glucose 169 (H) 65 - 99 mg/dL    Bun 31 (H) 8 - 22 mg/dL    Creatinine 0.81 0.50 - 1.40 mg/dL    Calcium 8.4 (L) 8.5 - 10.5 mg/dL    Anion Gap 12.0 7.0 - 16.0   CBC WITH DIFFERENTIAL    Collection Time: 03/07/24  1:51 AM   Result Value Ref Range    WBC 7.3 4.8 - 10.8 K/uL    RBC 2.59 (L) 4.70 - 6.10 M/uL    Hemoglobin 8.2 (L) 14.0 - 18.0 g/dL    Hematocrit 24.5 (L) 42.0 - 52.0 %    MCV 94.6 81.4 - 97.8 fL    MCH 31.7 27.0 - 33.0 pg    MCHC 33.5 32.3 - 36.5 g/dL    RDW 58.4 (H) 35.9 - 50.0 fL    Platelet Count 154 (L) 164 - 446 K/uL    MPV 10.2 9.0 - 12.9 fL    Neutrophils-Polys 63.60 44.00 - 72.00 %    Lymphocytes 18.40 (L) 22.00 - 41.00 %    Monocytes 17.30 (H) 0.00 - 13.40 %    Eosinophils 0.30 0.00 - 6.90 %    Basophils 0.10 0.00 - 1.80 %    Immature Granulocytes 0.30 0.00 - 0.90 %    Nucleated RBC 0.00 0.00 - 0.20 /100 WBC    Neutrophils (Absolute) 4.65  1.82 - 7.42 K/uL    Lymphs (Absolute) 1.34 1.00 - 4.80 K/uL    Monos (Absolute) 1.26 (H) 0.00 - 0.85 K/uL    Eos (Absolute) 0.02 0.00 - 0.51 K/uL    Baso (Absolute) 0.01 0.00 - 0.12 K/uL    Immature Granulocytes (abs) 0.02 0.00 - 0.11 K/uL    NRBC (Absolute) 0.00 K/uL   ESTIMATED GFR    Collection Time: 03/07/24  1:51 AM   Result Value Ref Range    GFR (CKD-EPI) 88 >60 mL/min/1.73 m 2   POCT glucose device results    Collection Time: 03/07/24  8:41 AM   Result Value Ref Range    POC Glucose, Blood 125 (H) 65 - 99 mg/dL   POCT glucose device results    Collection Time: 03/07/24 12:33 PM   Result Value Ref Range    POC Glucose, Blood 194 (H) 65 - 99 mg/dL   LACTIC ACID    Collection Time: 03/07/24 12:43 PM   Result Value Ref Range    Lactic Acid 1.0 0.5 - 2.0 mmol/L   CORTISOL    Collection Time: 03/07/24 12:43 PM   Result Value Ref Range    Cortisol 14.9 0.0 - 23.0 ug/dL   CBC WITH DIFFERENTIAL    Collection Time: 03/07/24 12:43 PM   Result Value Ref Range    WBC 7.6 4.8 - 10.8 K/uL    RBC 2.48 (L) 4.70 - 6.10 M/uL    Hemoglobin 8.0 (L) 14.0 - 18.0 g/dL    Hematocrit 23.8 (L) 42.0 - 52.0 %    MCV 96.0 81.4 - 97.8 fL    MCH 32.3 27.0 - 33.0 pg    MCHC 33.6 32.3 - 36.5 g/dL    RDW 59.0 (H) 35.9 - 50.0 fL    Platelet Count 132 (L) 164 - 446 K/uL    MPV 9.8 9.0 - 12.9 fL    Neutrophils-Polys 71.90 44.00 - 72.00 %    Lymphocytes 11.90 (L) 22.00 - 41.00 %    Monocytes 15.70 (H) 0.00 - 13.40 %    Eosinophils 0.00 0.00 - 6.90 %    Basophils 0.10 0.00 - 1.80 %    Immature Granulocytes 0.40 0.00 - 0.90 %    Nucleated RBC 0.00 0.00 - 0.20 /100 WBC    Neutrophils (Absolute) 5.45 1.82 - 7.42 K/uL    Lymphs (Absolute) 0.90 (L) 1.00 - 4.80 K/uL    Monos (Absolute) 1.19 (H) 0.00 - 0.85 K/uL    Eos (Absolute) 0.00 0.00 - 0.51 K/uL    Baso (Absolute) 0.01 0.00 - 0.12 K/uL    Immature Granulocytes (abs) 0.03 0.00 - 0.11 K/uL    NRBC (Absolute) 0.00 K/uL   PROCALCITONIN    Collection Time: 03/07/24 12:43 PM   Result Value Ref Range     Procalcitonin 0.35 (H) <0.25 ng/mL   Basic Metabolic Panel    Collection Time: 03/07/24 12:43 PM   Result Value Ref Range    Sodium 138 135 - 145 mmol/L    Potassium 4.0 3.6 - 5.5 mmol/L    Chloride 108 96 - 112 mmol/L    Co2 21 20 - 33 mmol/L    Glucose 187 (H) 65 - 99 mg/dL    Bun 29 (H) 8 - 22 mg/dL    Creatinine 0.85 0.50 - 1.40 mg/dL    Calcium 7.9 (L) 8.5 - 10.5 mg/dL    Anion Gap 9.0 7.0 - 16.0   ESTIMATED GFR    Collection Time: 03/07/24 12:43 PM   Result Value Ref Range    GFR (CKD-EPI) 86 >60 mL/min/1.73 m 2         Imaging:  DX-PORTABLE FLUORO > 1 HOUR  Narrative: 3/5/2024 4:05 PM    HISTORY/REASON FOR EXAM:  Left humerus and left wrist ORIF    TECHNIQUE/EXAM DESCRIPTION AND NUMBER OF VIEWS:  Portable fluoroscopy for greater than one hour in OR.    FINDINGS:  The portable fluoroscopy unit was obligated to the procedure for greater than one hour. Actual fluoro time was 3 minutes.    Fluoroscopy dose(DAP): 2.3045 Gy*cm^2  Impression: Portable fluoroscopy utilized for 3 minutes.    INTERPRETING LOCATION: [address] [place] [address], [address] [place] [address] Data Review:  -Records reviewed and summarized in current documentation  -I personally reviewed and interpreted the laboratory results  -I personally reviewed the radiology images  -I have personally reviewed medications    Hospital Problem List:  Active Hospital Problems    Diagnosis     Hypotension [I95.9]     Left supracondylar humerus fracture, closed, initial encounter [S42.412A]     Encounter for preoperative assessment [Z01.818]     Fracture of radius [S52.90XA]     Hypotension due to drugs [I95.2]     Atrial fibrillation with RVR (HCC) [I48.91]     Anemia [D64.9]     Chronic systolic congestive heart failure (HCC) [I50.22]     Syncope and collapse [R55]        Impressions:  83-year-old male with a downtrending hemoglobin but no GI bloody show.  Patient on DOAC therapy.  That was held as of this a.m.  In the absence of GI bloody show I do not recommend  endoscopy.  Will await stool analysis.  Will watch for melena or hematemesis.  If the patient were having a significant bleeding event internally that would cause this level of decline in his hemoglobin he would develop melena or hematemesis.    Patient had a colonoscopy per the wife's recollection in the [facility] last year.    Recommendations:  Await FOBT  Await GI bloody show  No immediate plans for endoscopy    Discussed patient condition and risk of morbidity and/or mortality with Hospitalist.  Time spent in chart review, counseling, coordination of care: 45min

## 2024-03-07 NOTE — PROGRESS NOTES
0705 - Report received from Akua GARZA at patient's bedside. Patient resting in bed quietly with no complaints at this time. Telemetry monitor intact et functioning. Call light and belongings within reach, safety measures intact, white board updated.     0800 - Reported decrease in hgb to provider. Also reported no BM and no bowel protocol. VORB bowel protocol.     1209 - Reported low BP to provider. Recheck confirmed. Small bolus 500 mls given. Provider to order transfusion.   Occult stool ordered, patient has not had BM for 3 days. Cannot send sample.     1400 - GI provider in to see patient. Starting blood as ordered.     1915 - Reported off to [place] GARZA at patient's bedside.

## 2024-03-08 ENCOUNTER — APPOINTMENT (OUTPATIENT)
Dept: RADIOLOGY | Facility: MEDICAL CENTER | Age: 84
DRG: 493 | End: 2024-03-08
Attending: STUDENT IN AN ORGANIZED HEALTH CARE EDUCATION/TRAINING PROGRAM
Payer: MEDICARE

## 2024-03-08 LAB
BASOPHILS # BLD AUTO: 0.1 % (ref 0–1.8)
BASOPHILS # BLD: 0.01 K/UL (ref 0–0.12)
EOSINOPHIL # BLD AUTO: 0.06 K/UL (ref 0–0.51)
EOSINOPHIL NFR BLD: 0.9 % (ref 0–6.9)
ERYTHROCYTE [DISTWIDTH] IN BLOOD BY AUTOMATED COUNT: 57.2 FL (ref 35.9–50)
GLUCOSE BLD STRIP.AUTO-MCNC: 143 MG/DL (ref 65–99)
GLUCOSE BLD STRIP.AUTO-MCNC: 165 MG/DL (ref 65–99)
GLUCOSE BLD STRIP.AUTO-MCNC: 94 MG/DL (ref 65–99)
HCT VFR BLD AUTO: 27.4 % (ref 42–52)
HGB BLD-MCNC: 9.3 G/DL (ref 14–18)
IMM GRANULOCYTES # BLD AUTO: 0.03 K/UL (ref 0–0.11)
IMM GRANULOCYTES NFR BLD AUTO: 0.4 % (ref 0–0.9)
LYMPHOCYTES # BLD AUTO: 1.45 K/UL (ref 1–4.8)
LYMPHOCYTES NFR BLD: 20.6 % (ref 22–41)
MCH RBC QN AUTO: 32.3 PG (ref 27–33)
MCHC RBC AUTO-ENTMCNC: 33.9 G/DL (ref 32.3–36.5)
MCV RBC AUTO: 95.1 FL (ref 81.4–97.8)
MONOCYTES # BLD AUTO: 1.21 K/UL (ref 0–0.85)
MONOCYTES NFR BLD AUTO: 17.2 % (ref 0–13.4)
NEUTROPHILS # BLD AUTO: 4.29 K/UL (ref 1.82–7.42)
NEUTROPHILS NFR BLD: 60.8 % (ref 44–72)
NRBC # BLD AUTO: 0 K/UL
NRBC BLD-RTO: 0 /100 WBC (ref 0–0.2)
PLATELET # BLD AUTO: 142 K/UL (ref 164–446)
PMV BLD AUTO: 10.4 FL (ref 9–12.9)
RBC # BLD AUTO: 2.88 M/UL (ref 4.7–6.1)
WBC # BLD AUTO: 7.1 K/UL (ref 4.8–10.8)

## 2024-03-08 PROCEDURE — A9270 NON-COVERED ITEM OR SERVICE: HCPCS | Performed by: STUDENT IN AN ORGANIZED HEALTH CARE EDUCATION/TRAINING PROGRAM

## 2024-03-08 PROCEDURE — 700102 HCHG RX REV CODE 250 W/ 637 OVERRIDE(OP): Performed by: STUDENT IN AN ORGANIZED HEALTH CARE EDUCATION/TRAINING PROGRAM

## 2024-03-08 PROCEDURE — 99233 SBSQ HOSP IP/OBS HIGH 50: CPT | Performed by: STUDENT IN AN ORGANIZED HEALTH CARE EDUCATION/TRAINING PROGRAM

## 2024-03-08 PROCEDURE — C9113 INJ PANTOPRAZOLE SODIUM, VIA: HCPCS | Performed by: STUDENT IN AN ORGANIZED HEALTH CARE EDUCATION/TRAINING PROGRAM

## 2024-03-08 PROCEDURE — 770020 HCHG ROOM/CARE - TELE (206)

## 2024-03-08 PROCEDURE — 74018 RADEX ABDOMEN 1 VIEW: CPT

## 2024-03-08 PROCEDURE — 82962 GLUCOSE BLOOD TEST: CPT | Mod: 91

## 2024-03-08 PROCEDURE — 85025 COMPLETE CBC W/AUTO DIFF WBC: CPT

## 2024-03-08 PROCEDURE — 306466 ARJO XLARGE SLING FOR UP TO 500LBS: Performed by: STUDENT IN AN ORGANIZED HEALTH CARE EDUCATION/TRAINING PROGRAM

## 2024-03-08 PROCEDURE — 36415 COLL VENOUS BLD VENIPUNCTURE: CPT

## 2024-03-08 PROCEDURE — 700111 HCHG RX REV CODE 636 W/ 250 OVERRIDE (IP): Performed by: STUDENT IN AN ORGANIZED HEALTH CARE EDUCATION/TRAINING PROGRAM

## 2024-03-08 PROCEDURE — 99232 SBSQ HOSP IP/OBS MODERATE 35: CPT | Performed by: NURSE PRACTITIONER

## 2024-03-08 RX ORDER — BISACODYL 10 MG
10 SUPPOSITORY, RECTAL RECTAL DAILY
Status: DISCONTINUED | OUTPATIENT
Start: 2024-03-08 | End: 2024-03-09 | Stop reason: HOSPADM

## 2024-03-08 RX ORDER — CARVEDILOL 3.12 MG/1
3.12 TABLET ORAL 2 TIMES DAILY WITH MEALS
Status: DISCONTINUED | OUTPATIENT
Start: 2024-03-08 | End: 2024-03-09 | Stop reason: HOSPADM

## 2024-03-08 RX ADMIN — FINASTERIDE 5 MG: 5 TABLET, FILM COATED ORAL at 04:07

## 2024-03-08 RX ADMIN — DOCUSATE SODIUM 50 MG AND SENNOSIDES 8.6 MG 2 TABLET: 8.6; 5 TABLET, FILM COATED ORAL at 17:38

## 2024-03-08 RX ADMIN — CARVEDILOL 3.12 MG: 3.12 TABLET, FILM COATED ORAL at 17:38

## 2024-03-08 RX ADMIN — PANTOPRAZOLE SODIUM 40 MG: 40 INJECTION, POWDER, LYOPHILIZED, FOR SOLUTION INTRAVENOUS at 04:07

## 2024-03-08 RX ADMIN — INSULIN GLARGINE-YFGN 15 UNITS: 100 INJECTION, SOLUTION SUBCUTANEOUS at 19:20

## 2024-03-08 RX ADMIN — FERROUS SULFATE TAB 325 MG (65 MG ELEMENTAL FE) 325 MG: 325 (65 FE) TAB at 08:27

## 2024-03-08 RX ADMIN — PANTOPRAZOLE SODIUM 40 MG: 40 INJECTION, POWDER, LYOPHILIZED, FOR SOLUTION INTRAVENOUS at 17:38

## 2024-03-08 RX ADMIN — CLOPIDOGREL BISULFATE 75 MG: 75 TABLET ORAL at 04:06

## 2024-03-08 RX ADMIN — MAGNESIUM HYDROXIDE 30 ML: 1200 LIQUID ORAL at 11:09

## 2024-03-08 RX ADMIN — BISACODYL 10 MG: 10 SUPPOSITORY RECTAL at 12:32

## 2024-03-08 RX ADMIN — ATORVASTATIN CALCIUM 80 MG: 80 TABLET, FILM COATED ORAL at 20:15

## 2024-03-08 RX ADMIN — DAPAGLIFLOZIN 10 MG: 10 TABLET, FILM COATED ORAL at 04:06

## 2024-03-08 RX ADMIN — APIXABAN 5 MG: 5 TABLET, FILM COATED ORAL at 17:38

## 2024-03-08 RX ADMIN — ACETAMINOPHEN 650 MG: 325 TABLET, FILM COATED ORAL at 20:15

## 2024-03-08 ASSESSMENT — ENCOUNTER SYMPTOMS
HEADACHES: 0
CONSTIPATION: 0
FALLS: 0
MYALGIAS: 1
INSOMNIA: 0
DIARRHEA: 0
FLANK PAIN: 0
NERVOUS/ANXIOUS: 0
SHORTNESS OF BREATH: 0
COUGH: 0
FEVER: 0
WEAKNESS: 0
VOMITING: 0
ABDOMINAL PAIN: 0
NAUSEA: 0
CHILLS: 0
BLOOD IN STOOL: 0
SORE THROAT: 0

## 2024-03-08 ASSESSMENT — PAIN DESCRIPTION - PAIN TYPE
TYPE: ACUTE PAIN;SURGICAL PAIN
TYPE: ACUTE PAIN;SURGICAL PAIN

## 2024-03-08 ASSESSMENT — FIBROSIS 4 INDEX: FIB4 SCORE: 2.65

## 2024-03-08 NOTE — PROGRESS NOTES
Gastroenterology Progress Note               Author:  HAN Hines Date & Time Created: 3/8/2024 11:18 AM       Patient ID:  Name:             Nolberto Severino  YOB: 1940  Age:                 83 y.o.  male  MRN:               [id number]        Medical Decision Making, by Problem:  Active Hospital Problems    Diagnosis     Hypotension [I95.9]     Left supracondylar humerus fracture, closed, initial encounter [S42.412A]     Encounter for preoperative assessment [Z01.818]     Fracture of radius [S52.90XA]     Hypotension due to drugs [I95.2]     Atrial fibrillation with RVR (HCC) [I48.91]     Anemia [D64.9]     Chronic systolic congestive heart failure (HCC) [I50.22]     Syncope and collapse [R55]            Presenting Chief Complaint:  Anemia     History of Present Illness:   83-year-old male on Eliquis with falling hemoglobin.  GI consulted for possible bleed.  Patient not having melena.  No hematemesis.  No GI bloody show.  The wife remembers the patient getting a colonoscopy in the [facility] last year.  She recalls him having some polyps.         Interval History:  3/8/2024: Patient seen at bedside with wife.  He reports left upper extremity pain but otherwise feeling well without nausea, vomiting, abdominal pain.  Tolerating oral intake without difficulty.  No bowel movement overnight.  Hemoglobin stable 8-9-9.3.  Patient's wife reaffirms that patient had colonoscopy in October 2023 during which time a polyp was removed but otherwise normal colonoscopy than that.        Hospital Medications:  Current Facility-Administered Medications   Medication Dose Frequency Provider Last Rate Last Admin    [Held by provider] carvedilol (Coreg) tablet 3.125 mg  3.125 mg BID WITH MEALS Zeke Shultz M.D.        ferrous sulfate tablet 325 mg  325 mg QDAY with Breakfast Zeke Shultz M.D.   325 mg at 03/08/24 0827    senna-docusate (Pericolace Or Senokot S) 8.6-50 MG per tablet 2 Tablet  2 Tablet Q  EVENING Zeke Shultz M.D.   2 Tablet at 03/07/24 1749    And    polyethylene glycol/lytes (Miralax) Packet 1 Packet  1 Packet QDAY PRN Zeke Shultz M.D.   1 Packet at 03/07/24 0904    magnesium hydroxide (Milk Of Magnesia) suspension 30 mL  30 mL QDAY PRN Zeke Shultz M.D.   30 mL at 03/08/24 1109    pantoprazole (Protonix) injection 40 mg  40 mg BID Zeke Shultz M.D.   40 mg at 03/08/24 0407    insulin regular (HumuLIN R,NovoLIN R) injection  1-6 Units 4X/DAY ACHS Yves Grier, MINDY.P.R.N.   1 Units at 03/07/24 1239    And    dextrose 50% (D50W) injection 25 g  25 g Q15 MIN PRN Yves Grier, A.P.R.N.        apixaban (Eliquis) tablet 5 mg  5 mg BID Zeke Shultz M.D.   5 mg at 03/07/24 0523    [Held by provider] sacubitril-valsartan (Entresto) 24-26 MG 1 Tablet  1 Tablet BID Zeke Shultz M.D.   1 Tablet at 03/07/24 0523    clopidogrel (Plavix) tablet 75 mg  75 mg DAILY Zeke Shultz M.D.   75 mg at 03/08/24 0406    acetaminophen (Tylenol) tablet 650 mg  650 mg Q6HRS PRN Evaristo Walls M.D.   650 mg at 03/05/24 2325    atorvastatin (Lipitor) tablet 80 mg  80 mg Nightly Evaristo Walls M.D.   80 mg at 03/07/24 2027    dapagliflozin propanediol (Farxiga) tablet 10 mg  10 mg DAILY Evaristo Walls M.D.   10 mg at 03/08/24 0406    finasteride (Proscar) tablet 5 mg  5 mg DAILY Evaristo Walls M.D.   5 mg at 03/08/24 0407    insulin GLARGINE (Lantus,Semglee) injection  15 Units Q EVENING Nolberto Lind M.D.   15 Units at 03/07/24 1754   Last reviewed on 3/5/2024  2:47 PM by Traci Gasca R.N.       Review of Systems:  Review of Systems   Constitutional:  Negative for chills, fever and malaise/fatigue.   HENT:  Negative for congestion and sore throat.    Respiratory:  Negative for cough and shortness of breath.    Cardiovascular:  Negative for chest pain and leg swelling.   Gastrointestinal:  Negative for abdominal pain, blood in stool, constipation, diarrhea, melena, nausea and vomiting.   Genitourinary:  Negative for dysuria  and flank pain.   Musculoskeletal:  Positive for myalgias (Left upper extremity pain s/p surgical interventions). Negative for falls.   Neurological:  Negative for weakness and headaches.   Psychiatric/Behavioral:  The patient is not nervous/anxious and does not have insomnia.    All other systems reviewed and are negative.        Vital signs:  Weight/BMI: Body mass index is 24.13 kg/m².  /71   Pulse 81   Temp 36.9 °C (98.5 °F) (Temporal)   Resp 17   Ht 1.829 m (6')   Wt 80.7 kg (177 lb 14.6 oz)   SpO2 98%   Vitals:    03/07/24 2003 03/07/24 2322 03/08/24 0434 03/08/24 0740   BP: 93/54 95/58 106/68 107/71   Pulse: 83 70 87 81   Resp: 18 16 16 17   Temp: 36.9 °C (98.4 °F) 37.1 °C (98.8 °F) 36.2 °C (97.1 °F) 36.9 °C (98.5 °F)   TempSrc: Temporal Temporal Temporal Temporal   SpO2: 97% 96% 98% 98%   Weight:   80.7 kg (177 lb 14.6 oz)    Height:         Oxygen Therapy:  Pulse Oximetry: 98 %, O2 (LPM): 1, O2 Delivery Device: Silicone Nasal Cannula    Intake/Output Summary (Last 24 hours) at 3/8/2024 1118  Last data filed at 3/8/2024 1000  Gross per 24 hour   Intake 1470 ml   Output 200 ml   Net 1270 ml         Physical Exam:  Physical Exam  Vitals and nursing note reviewed.   Constitutional:       General: He is awake. He is not in acute distress.     Appearance: Normal appearance. He is well-developed, well-groomed and normal weight.   HENT:      Head: Normocephalic.      Nose: No congestion.      Mouth/Throat:      Mouth: Mucous membranes are moist.      Pharynx: Oropharynx is clear. No oropharyngeal exudate.   Eyes:      General: No scleral icterus.     Extraocular Movements: Extraocular movements intact.      Conjunctiva/sclera: Conjunctivae normal.   Cardiovascular:      Rate and Rhythm: Normal rate and regular rhythm.      Pulses: Normal pulses.      Heart sounds: Normal heart sounds. No murmur heard.  Pulmonary:      Effort: Pulmonary effort is normal. No respiratory distress.      Breath sounds: Normal  breath sounds.   Abdominal:      General: Abdomen is flat. Bowel sounds are normal. There is no distension.      Palpations: Abdomen is soft.      Tenderness: There is no abdominal tenderness. There is no guarding.   Musculoskeletal:      Right lower leg: No edema.      Left lower leg: No edema.      Comments: Surgical dressing to left upper extremity clean, dry, intact.  Sling to left upper extremity.   Skin:     General: Skin is warm and dry.      Capillary Refill: Capillary refill takes less than 2 seconds.      Coloration: Skin is not jaundiced or pale.   Neurological:      General: No focal deficit present.      Mental Status: He is alert and oriented to person, place, and time. Mental status is at baseline.   Psychiatric:         Mood and Affect: Mood normal.         Behavior: Behavior normal. Behavior is cooperative.             Labs:  Recent Labs     03/06/24 0205 03/07/24 0151 03/07/24  1243   SODIUM 139 138 138   POTASSIUM 4.4 3.9 4.0   CHLORIDE 105 106 108   CO2 18* 20 21   BUN 32* 31* 29*   CREATININE 1.00 0.81 0.85   CALCIUM 8.7 8.4* 7.9*     Recent Labs     03/06/24 0205 03/07/24 0151 03/07/24  1243   GLUCOSE 116* 169* 187*     Recent Labs     03/07/24 1243 03/07/24 2035 03/08/24  0426   WBC 7.6 7.0 7.1   NEUTSPOLYS 71.90 61.60 60.80   LYMPHOCYTES 11.90* 20.20* 20.60*   MONOCYTES 15.70* 17.20* 17.20*   EOSINOPHILS 0.00 0.60 0.90   BASOPHILS 0.10 0.00 0.10     Recent Labs     03/06/24 0205 03/07/24  0151 03/07/24  1243 03/07/24 2035 03/08/24  0426   RBC 2.87*   < > 2.48* 2.72* 2.88*   HEMOGLOBIN 9.1*   < > 8.0* 9.0* 9.3*   HEMATOCRIT 27.5*   < > 23.8* 25.8* 27.4*   PLATELETCT 159*   < > 132* 131* 142*   IRON 28*  --   --   --   --    FERRITIN 108.0  --   --   --   --    TOTIRONBC 226*  --   --   --   --     < > = values in this interval not displayed.     Recent Results (from the past 24 hour(s))   POCT glucose device results    Collection Time: 03/07/24 12:33 PM   Result Value Ref Range    POC  Glucose, Blood 194 (H) 65 - 99 mg/dL   LACTIC ACID    Collection Time: 03/07/24 12:43 PM   Result Value Ref Range    Lactic Acid 1.0 0.5 - 2.0 mmol/L   CORTISOL    Collection Time: 03/07/24 12:43 PM   Result Value Ref Range    Cortisol 14.9 0.0 - 23.0 ug/dL   CBC WITH DIFFERENTIAL    Collection Time: 03/07/24 12:43 PM   Result Value Ref Range    WBC 7.6 4.8 - 10.8 K/uL    RBC 2.48 (L) 4.70 - 6.10 M/uL    Hemoglobin 8.0 (L) 14.0 - 18.0 g/dL    Hematocrit 23.8 (L) 42.0 - 52.0 %    MCV 96.0 81.4 - 97.8 fL    MCH 32.3 27.0 - 33.0 pg    MCHC 33.6 32.3 - 36.5 g/dL    RDW 59.0 (H) 35.9 - 50.0 fL    Platelet Count 132 (L) 164 - 446 K/uL    MPV 9.8 9.0 - 12.9 fL    Neutrophils-Polys 71.90 44.00 - 72.00 %    Lymphocytes 11.90 (L) 22.00 - 41.00 %    Monocytes 15.70 (H) 0.00 - 13.40 %    Eosinophils 0.00 0.00 - 6.90 %    Basophils 0.10 0.00 - 1.80 %    Immature Granulocytes 0.40 0.00 - 0.90 %    Nucleated RBC 0.00 0.00 - 0.20 /100 WBC    Neutrophils (Absolute) 5.45 1.82 - 7.42 K/uL    Lymphs (Absolute) 0.90 (L) 1.00 - 4.80 K/uL    Monos (Absolute) 1.19 (H) 0.00 - 0.85 K/uL    Eos (Absolute) 0.00 0.00 - 0.51 K/uL    Baso (Absolute) 0.01 0.00 - 0.12 K/uL    Immature Granulocytes (abs) 0.03 0.00 - 0.11 K/uL    NRBC (Absolute) 0.00 K/uL   PROCALCITONIN    Collection Time: 03/07/24 12:43 PM   Result Value Ref Range    Procalcitonin 0.35 (H) <0.25 ng/mL   Basic Metabolic Panel    Collection Time: 03/07/24 12:43 PM   Result Value Ref Range    Sodium 138 135 - 145 mmol/L    Potassium 4.0 3.6 - 5.5 mmol/L    Chloride 108 96 - 112 mmol/L    Co2 21 20 - 33 mmol/L    Glucose 187 (H) 65 - 99 mg/dL    Bun 29 (H) 8 - 22 mg/dL    Creatinine 0.85 0.50 - 1.40 mg/dL    Calcium 7.9 (L) 8.5 - 10.5 mg/dL    Anion Gap 9.0 7.0 - 16.0   ESTIMATED GFR    Collection Time: 03/07/24 12:43 PM   Result Value Ref Range    GFR (CKD-EPI) 86 >60 mL/min/1.73 m 2   POCT glucose device results    Collection Time: 03/07/24  5:50 PM   Result Value Ref Range    POC  Glucose, Blood 141 (H) 65 - 99 mg/dL   POCT glucose device results    Collection Time: 03/07/24  8:30 PM   Result Value Ref Range    POC Glucose, Blood 131 (H) 65 - 99 mg/dL   CBC WITH DIFFERENTIAL    Collection Time: 03/07/24  8:35 PM   Result Value Ref Range    WBC 7.0 4.8 - 10.8 K/uL    RBC 2.72 (L) 4.70 - 6.10 M/uL    Hemoglobin 9.0 (L) 14.0 - 18.0 g/dL    Hematocrit 25.8 (L) 42.0 - 52.0 %    MCV 94.9 81.4 - 97.8 fL    MCH 33.1 (H) 27.0 - 33.0 pg    MCHC 34.9 32.3 - 36.5 g/dL    RDW 56.9 (H) 35.9 - 50.0 fL    Platelet Count 131 (L) 164 - 446 K/uL    MPV 9.9 9.0 - 12.9 fL    Neutrophils-Polys 61.60 44.00 - 72.00 %    Lymphocytes 20.20 (L) 22.00 - 41.00 %    Monocytes 17.20 (H) 0.00 - 13.40 %    Eosinophils 0.60 0.00 - 6.90 %    Basophils 0.00 0.00 - 1.80 %    Immature Granulocytes 0.40 0.00 - 0.90 %    Nucleated RBC 0.00 0.00 - 0.20 /100 WBC    Neutrophils (Absolute) 4.30 1.82 - 7.42 K/uL    Lymphs (Absolute) 1.41 1.00 - 4.80 K/uL    Monos (Absolute) 1.20 (H) 0.00 - 0.85 K/uL    Eos (Absolute) 0.04 0.00 - 0.51 K/uL    Baso (Absolute) 0.00 0.00 - 0.12 K/uL    Immature Granulocytes (abs) 0.03 0.00 - 0.11 K/uL    NRBC (Absolute) 0.00 K/uL   CBC WITH DIFFERENTIAL    Collection Time: 03/08/24  4:26 AM   Result Value Ref Range    WBC 7.1 4.8 - 10.8 K/uL    RBC 2.88 (L) 4.70 - 6.10 M/uL    Hemoglobin 9.3 (L) 14.0 - 18.0 g/dL    Hematocrit 27.4 (L) 42.0 - 52.0 %    MCV 95.1 81.4 - 97.8 fL    MCH 32.3 27.0 - 33.0 pg    MCHC 33.9 32.3 - 36.5 g/dL    RDW 57.2 (H) 35.9 - 50.0 fL    Platelet Count 142 (L) 164 - 446 K/uL    MPV 10.4 9.0 - 12.9 fL    Neutrophils-Polys 60.80 44.00 - 72.00 %    Lymphocytes 20.60 (L) 22.00 - 41.00 %    Monocytes 17.20 (H) 0.00 - 13.40 %    Eosinophils 0.90 0.00 - 6.90 %    Basophils 0.10 0.00 - 1.80 %    Immature Granulocytes 0.40 0.00 - 0.90 %    Nucleated RBC 0.00 0.00 - 0.20 /100 WBC    Neutrophils (Absolute) 4.29 1.82 - 7.42 K/uL    Lymphs (Absolute) 1.45 1.00 - 4.80 K/uL    Monos (Absolute)  1.21 (H) 0.00 - 0.85 K/uL    Eos (Absolute) 0.06 0.00 - 0.51 K/uL    Baso (Absolute) 0.01 0.00 - 0.12 K/uL    Immature Granulocytes (abs) 0.03 0.00 - 0.11 K/uL    NRBC (Absolute) 0.00 K/uL   POCT glucose device results    Collection Time: 03/08/24  8:37 AM   Result Value Ref Range    POC Glucose, Blood 94 65 - 99 mg/dL       Radiology Review:  [id number] VIEW   Final Result      Nonspecific bowel gas pattern with a moderate colonic stool burden.      DX-PORTABLE FLUORO > 1 HOUR   Final Result      Portable fluoroscopy utilized for 3 minutes.         INTERPRETING LOCATION: [address] [place] [address], [facility], [id number]      DX-HUMERUS 2+ LEFT   Final Result      Fluoroscopic image(s) obtained during left humeral ORIF. Surgical hardware projects appropriately. Please see the patient's chart for full procedural details.      Fluoroscopy time 2 minutes 28 seconds.      Fluoroscopy dose(DAP): 2 Gy*cm^2      DX-WRIST-COMPLETE 3+ LEFT   Final Result      Fluoroscopic image(s) obtained during left wrist instrumentation. Surgical hardware projects appropriately. Please see the patient's chart for full procedural details.      Fluoroscopy time 32 seconds.      Fluoroscopy dose(DAP): 0.27 Gy*cm^2      EC-ECHOCARDIOGRAM COMPLETE W/O CONT   Final Result      DX-HUMERUS 2+ LEFT   Final Result      Comminuted mildly displaced mid and distal humeral shaft fracture.      OUTSIDE IMAGES-CT CHEST/ABDOMEN/PELVIS   Final Result      OUTSIDE IMAGES-CT CERVICAL SPINE   Final Result      OUTSIDE IMAGES-CT HEAD   Final Result            MDM (Data Review):   -Records reviewed and summarized in current documentation  -I personally reviewed and interpreted the laboratory results  -I personally reviewed the radiology images    Assessment/Recommendations:  Anemia  Left supracondylar humerus fracture s/p surgical repair  Atrial fibrillation with RVR  Chronic systolic congestive heart failure  Syncope and collapse    MDM:  This an 83-year-old male with a past  medical history as listed above who presented to [facility] on 3/4/[facility]24 with a left supracondylar humerus fracture for which he underwent surgical repair with orthopedics.  GI was consulted for downtrending hemoglobin in the absence of obvious bleed.  Overnight, no bowel movement little low melena or hematochezia.  No hematemesis.  He denies nausea, abdominal pain.  Tolerating oral intake without difficulty.  His hemoglobin is uptrending 8-9-9.3.  Given the absence of obvious bleeding including hematemesis, melena, hematochezia in addition to stable/uptrending hemoglobin, low suspicion for GI bleeding.  GI will sign off for now will remain available should things change.    Plan:  H&H, monitor for signs of rebleeding  If stool collected may test for FOBT.  No plans for endoscopy.  Anticoagulation per guidance of orthopedic surgical team.  Diet as tolerated    No further interventions from acute GI service. GI to sign off. Please reconsult for any further questions or concerns.    Discussed with patient, wife, Dr. Howard.    Core Quality Measures   Reviewed items::  Labs, Medications and Radiology reports reviewed

## 2024-03-08 NOTE — PROGRESS NOTES
Monitor Summary  Rhythm: Afib  Rate: 75-87  Ectopy: PVC  Measurements: ---/0.12/0.45  ---12 hr Chart Review---

## 2024-03-08 NOTE — PROGRESS NOTES
Hospital Medicine Daily Progress Note    Date of Service  3/8/2024    Chief Complaint  Nolberto Severino is a 83 y.o. male admitted 3/4/2024 with fall    Hospital Course      83 female with past medical history of diabetes, A-fib on Eliquis, diabetes mellitus on insulin, HFrEF 25-30%, status post AICD presented after a fall.  Imaging noted with left humerus and radius fracture.  Repeat echocardiogram with EF around 30%, AICD interrogation unremarkable.  S/p  Open reduction internal fixation left humeral shaft with intramedullary nail and internal fixation left intra-articular distal radius fracture on 3/5 .  PT/OT recommending SNF.  GI evaluated.  Received 1 unit PRBC for anemia.  H&H remained stable above 9      Interval Problem Update    3/8/2024  Vitals remained stable  Reports being constipated  Blood pressure remained stable today  Labs reviewed H&H remained stable above 9      Telemetry monitoring  Resume Coreg and low-dose  Resume Eliquis  Started on ferrous sulfate  Initiated on board regimen  Repeat CBC in a.m.    Patient is spouse at bedside updated with plan.  All question answered        I have discussed this patient's plan of care and discharge plan at IDT rounds today with Case Management, Nursing, Nursing leadership, and other members of the IDT team.    Consultants/Specialty  orthopedics    Code Status  Full Code    Disposition  The patient is not medically cleared for discharge to home or a post-acute facility.  Anticipate discharge to: skilled nursing facility    I have placed the appropriate orders for post-discharge needs.    Review of Systems  Review of Systems   Cardiovascular:  Negative for chest pain.   Musculoskeletal:  Positive for joint pain.        Physical Exam  Temp:  [36.2 °C (97.1 °F)-37.1 °C (98.8 °F)] 36.9 °C (98.4 °F)  Pulse:  [70-95] 83  Resp:  [16-18] 18  BP: ()/(54-83) 120/83  SpO2:  [96 %-98 %] 97 %    Physical Exam  Constitutional:       Appearance: Normal appearance.    Cardiovascular:      Rate and Rhythm: Normal rate.      Pulses: Normal pulses.      Heart sounds: Normal heart sounds.   Pulmonary:      Effort: Pulmonary effort is normal.   Abdominal:      General: There is no distension.   Musculoskeletal:      Right lower leg: No edema.      Left lower leg: No edema.      Comments: On left arm sling, range of motion limited due to pain   Neurological:      General: No focal deficit present.      Mental Status: He is alert and oriented to person, place, and time.         Fluids    Intake/Output Summary (Last 24 hours) at 3/8/2024 1518  Last data filed at 3/8/2024 1000  Gross per 24 hour   Intake 610 ml   Output 200 ml   Net 410 ml       Laboratory  Recent Labs     03/07/24  1243 03/07/24  2035 03/08/24  0426   WBC 7.6 7.0 7.1   RBC 2.48* 2.72* 2.88*   HEMOGLOBIN 8.0* 9.0* 9.3*   HEMATOCRIT 23.8* 25.8* 27.4*   MCV 96.0 94.9 95.1   MCH 32.3 33.1* 32.3   MCHC 33.6 34.9 33.9   RDW 59.0* 56.9* 57.2*   PLATELETCT 132* 131* 142*   MPV 9.8 9.9 10.4     Recent Labs     03/06/24  0205 03/07/24  0151 03/07/24  1243   SODIUM 139 138 138   POTASSIUM 4.4 3.9 4.0   CHLORIDE 105 106 108   CO2 18* 20 21   GLUCOSE 116* 169* 187*   BUN 32* 31* 29*   CREATININE 1.00 0.81 0.85   CALCIUM 8.7 8.4* 7.9*                     Imaging  [id number] VIEW   Final Result      Nonspecific bowel gas pattern with a moderate colonic stool burden.      DX-PORTABLE FLUORO > 1 HOUR   Final Result      Portable fluoroscopy utilized for 3 minutes.         INTERPRETING LOCATION: [address] [place] [address], [id number]      DX-HUMERUS 2+ LEFT   Final Result      Fluoroscopic image(s) obtained during left humeral ORIF. Surgical hardware projects appropriately. Please see the patient's chart for full procedural details.      Fluoroscopy time 2 minutes 28 seconds.      Fluoroscopy dose(DAP): 2 Gy*cm^2      DX-WRIST-COMPLETE 3+ LEFT   Final Result      Fluoroscopic image(s) obtained during left wrist instrumentation. Surgical  hardware projects appropriately. Please see the patient's chart for full procedural details.      Fluoroscopy time 32 seconds.      Fluoroscopy dose(DAP): 0.27 Gy*cm^2      EC-ECHOCARDIOGRAM COMPLETE W/O CONT   Final Result      DX-HUMERUS 2+ LEFT   Final Result      Comminuted mildly displaced mid and distal humeral shaft fracture.      OUTSIDE IMAGES-CT CHEST/ABDOMEN/PELVIS   Final Result      OUTSIDE IMAGES-CT CERVICAL SPINE   Final Result      OUTSIDE IMAGES-CT HEAD   Final Result           Assessment/Plan  * Left supracondylar humerus fracture, closed, initial encounter- (present on admission)  Assessment & Plan  S/p  Open reduction internal fixation left humeral shaft with intramedullary nail and internal fixation left intra-articular distal radius fracture on 3/5  Pain management  PT/OT recommending SNF   assisting    Hypotension  Assessment & Plan  Resolved with IV fluid and PRBC transfusion    Anemia- (present on admission)  Assessment & Plan  Unclear etiology, hemoglobin 11.5 on presentation here his baseline based on previous labs is 15  No signs of GI bleeding  Improved PRBC transfusion  Added ferrous sulfate      Atrial fibrillation with RVR (HCC)- (present on admission)  Assessment & Plan  Rate controlled      Hypotension due to drugs- (present on admission)  Assessment & Plan  Blood pressure stable  Continue to monitor    Fracture of radius- (present on admission)  Assessment & Plan  X-ray at outside facility showed left radius fracture.  Ortho consulted    Encounter for preoperative assessment- (present on admission)  Assessment & Plan  S/p  Open reduction internal fixation left humeral shaft with intramedullary nail and internal fixation left intra-articular distal radius fracture on 3/5    Chronic systolic congestive heart failure (HCC)- (present on admission)  Assessment & Plan  Euvolemic on exam  Repeat echo with ejection fraction around 30%  Continue home medication    Syncope and  collapse- (present on admission)  Assessment & Plan  Multifactorial  Repeat echocardiogram with ejection fraction around 30%  AICD interrogation was unremarkable          VTE prophylaxis: Resume Eliquis  I have performed a physical exam and reviewed and updated ROS and Plan today (3/8/2024). In review of yesterday's note (3/7/2024), there are no changes except as documented above.         Greater than 51 minutes spent preparing to see patient (e.g. review of tests) obtaining and/or reviewing separately obtained history. Performing a medically appropriate examination and/ evaluation.  Counseling and educating the patient/family/caregiver.  Ordering medications, tests, or procedures.  Referring and communicating with other health care professionals.  Documenting clinical information in EPIC.  Independently interpreting results and communicating results to patient/family/caregiver.  Care coordination.

## 2024-03-08 NOTE — DISCHARGE PLANNING
-1210  DPA called [facility] to inquire about SNF referral. DPA left name and number with  asking for a call back from admissions.     -1245  DPA received call back from [facility]. Pt is accepted by them. They cannot admit during weekends unless discharge date/plan is set with them prior.

## 2024-03-08 NOTE — CARE PLAN
The patient is Stable - Low risk of patient condition declining or worsening    Shift Goals  Clinical Goals: pain management; monitor labs  Patient Goals: rest  Family Goals: bhavana    Progress made toward(s) clinical / shift goals:  Patient safety maintained throughout shift; patient reports that pain is well managed    Patient is not progressing towards the following goals:

## 2024-03-08 NOTE — PROGRESS NOTES
Monitor Summary:   Rhythm: Atrial fibrillation  Rate: 87 -105  Measurement: ../.08/..  Ectopy: Rare PVC    12 hour chart check

## 2024-03-08 NOTE — PROGRESS NOTES
Orthopedic PA Progress Note    Interval changes:  Patient doing well.   LUE splint and dressings are CDI  NWB LUE in sling  Mobilize with therapy when able  Cleared for DC from orthopedic standpoint pending medical optimization    ROS - Patient denies any new issues.  Denies any numbness or tingling. Pain well controlled.    /68   Pulse 87   Temp 36.2 °C (97.1 °F) (Temporal)   Resp 16   Ht 1.829 m (6')   Wt 80.7 kg (177 lb 14.6 oz)   SpO2 98%     Patient seen and examined  No acute distress  Breathing non labored  RRR  LUE: Splint and dressings CDI. Minimal flexion and extension to fingers. Will resist movement, but will not move on own. Sensation intact. Cap refill <2s.     Recent Labs     03/07/24  1243 03/07/24  2035 03/08/24  0426   WBC 7.6 7.0 7.1   RBC 2.48* 2.72* 2.88*   HEMOGLOBIN 8.0* 9.0* 9.3*   HEMATOCRIT 23.8* 25.8* 27.4*   MCV 96.0 94.9 95.1   MCH 32.3 33.1* 32.3   MCHC 33.6 34.9 33.9   RDW 59.0* 56.9* 57.2*   PLATELETCT 132* 131* 142*   MPV 9.8 9.9 10.4       Active Hospital Problems    Diagnosis     Chronic systolic congestive heart failure (HCC) [I50.22]      Priority: High     November 2022: Echocardiogram with normal LV size, mild concentric LVH, LVEF 25%. Mildly dilated LA and RV, enlarged RA. Moderate MR, mild TR. RVSP 20mmHg.      Hypotension [I95.9]     Left supracondylar humerus fracture, closed, initial encounter [S42.412A]     Encounter for preoperative assessment [Z01.818]     Fracture of radius [S52.90XA]     Hypotension due to drugs [I95.2]     Atrial fibrillation with RVR (HCC) [I48.91]     Anemia [D64.9]     Syncope and collapse [R55]        Assessment/Plan:  Patient doing well.   LUE splint and dressings are CDI  NWB LUE in sling  Mobilize with therapy when able  Cleared for DC from orthopedic standpoint pending medical optimization    POD#3 S/p  Open reduction internal fixation left humeral shaft with intramedullary nail  Open reduction internal fixation left  intra-articular distal radius fracture  Wt bearing status - NWB LUE in sling  Wound care/Drains - Dressings to be left in place to lower arm, ok to change shoulder dressings PRN with gauze/tegaderm  Future Procedures - None planned   Lovenox: Start 3/6, Duration-until ambulatory > 150'  Sutures/Staples out- 14-21 days post operatively. Removal will completed by ortho RENUKA's unless transferred.  PT/OT-initiated  Antibiotics:  Perioperative completed  DVT Prophylaxis- TEDS/SCDs/Foot pumps  Gilliam-not needed per ortho  Case Coordination for Discharge Planning - Disposition per therapy recs.

## 2024-03-09 VITALS
SYSTOLIC BLOOD PRESSURE: 116 MMHG | TEMPERATURE: 97.4 F | BODY MASS INDEX: 24.43 KG/M2 | DIASTOLIC BLOOD PRESSURE: 72 MMHG | RESPIRATION RATE: 17 BRPM | HEART RATE: 71 BPM | OXYGEN SATURATION: 95 % | HEIGHT: 72 IN | WEIGHT: 180.34 LBS

## 2024-03-09 LAB
BASOPHILS # BLD AUTO: 0.1 % (ref 0–1.8)
BASOPHILS # BLD: 0.01 K/UL (ref 0–0.12)
EOSINOPHIL # BLD AUTO: 0.05 K/UL (ref 0–0.51)
EOSINOPHIL NFR BLD: 0.6 % (ref 0–6.9)
ERYTHROCYTE [DISTWIDTH] IN BLOOD BY AUTOMATED COUNT: 57.3 FL (ref 35.9–50)
GLUCOSE BLD STRIP.AUTO-MCNC: 101 MG/DL (ref 65–99)
GLUCOSE BLD STRIP.AUTO-MCNC: 86 MG/DL (ref 65–99)
HCT VFR BLD AUTO: 27.5 % (ref 42–52)
HGB BLD-MCNC: 9.3 G/DL (ref 14–18)
IMM GRANULOCYTES # BLD AUTO: 0.05 K/UL (ref 0–0.11)
IMM GRANULOCYTES NFR BLD AUTO: 0.6 % (ref 0–0.9)
LYMPHOCYTES # BLD AUTO: 1.23 K/UL (ref 1–4.8)
LYMPHOCYTES NFR BLD: 14.8 % (ref 22–41)
MCH RBC QN AUTO: 31.8 PG (ref 27–33)
MCHC RBC AUTO-ENTMCNC: 33.8 G/DL (ref 32.3–36.5)
MCV RBC AUTO: 94.2 FL (ref 81.4–97.8)
MONOCYTES # BLD AUTO: 1.29 K/UL (ref 0–0.85)
MONOCYTES NFR BLD AUTO: 15.5 % (ref 0–13.4)
NEUTROPHILS # BLD AUTO: 5.7 K/UL (ref 1.82–7.42)
NEUTROPHILS NFR BLD: 68.4 % (ref 44–72)
NRBC # BLD AUTO: 0 K/UL
NRBC BLD-RTO: 0 /100 WBC (ref 0–0.2)
PLATELET # BLD AUTO: 174 K/UL (ref 164–446)
PMV BLD AUTO: 10.1 FL (ref 9–12.9)
RBC # BLD AUTO: 2.92 M/UL (ref 4.7–6.1)
WBC # BLD AUTO: 8.3 K/UL (ref 4.8–10.8)

## 2024-03-09 PROCEDURE — 85025 COMPLETE CBC W/AUTO DIFF WBC: CPT

## 2024-03-09 PROCEDURE — 700111 HCHG RX REV CODE 636 W/ 250 OVERRIDE (IP): Performed by: STUDENT IN AN ORGANIZED HEALTH CARE EDUCATION/TRAINING PROGRAM

## 2024-03-09 PROCEDURE — 99239 HOSP IP/OBS DSCHRG MGMT >30: CPT | Performed by: STUDENT IN AN ORGANIZED HEALTH CARE EDUCATION/TRAINING PROGRAM

## 2024-03-09 PROCEDURE — A9270 NON-COVERED ITEM OR SERVICE: HCPCS | Performed by: STUDENT IN AN ORGANIZED HEALTH CARE EDUCATION/TRAINING PROGRAM

## 2024-03-09 PROCEDURE — 700102 HCHG RX REV CODE 250 W/ 637 OVERRIDE(OP): Performed by: STUDENT IN AN ORGANIZED HEALTH CARE EDUCATION/TRAINING PROGRAM

## 2024-03-09 PROCEDURE — 82962 GLUCOSE BLOOD TEST: CPT

## 2024-03-09 PROCEDURE — 36415 COLL VENOUS BLD VENIPUNCTURE: CPT

## 2024-03-09 PROCEDURE — C9113 INJ PANTOPRAZOLE SODIUM, VIA: HCPCS | Performed by: STUDENT IN AN ORGANIZED HEALTH CARE EDUCATION/TRAINING PROGRAM

## 2024-03-09 RX ORDER — OXYCODONE HYDROCHLORIDE 5 MG/1
5 TABLET ORAL EVERY 8 HOURS PRN
Qty: 5 TABLET | Refills: 0 | Status: SHIPPED
Start: 2024-03-09 | End: 2024-03-14

## 2024-03-09 RX ORDER — FERROUS SULFATE 325(65) MG
325 TABLET ORAL
Status: SHIPPED
Start: 2024-03-10

## 2024-03-09 RX ORDER — CARVEDILOL 3.12 MG/1
3.12 TABLET ORAL 2 TIMES DAILY WITH MEALS
Status: SHIPPED
Start: 2024-03-09

## 2024-03-09 RX ADMIN — CLOPIDOGREL BISULFATE 75 MG: 75 TABLET ORAL at 05:41

## 2024-03-09 RX ADMIN — DAPAGLIFLOZIN 10 MG: 10 TABLET, FILM COATED ORAL at 05:40

## 2024-03-09 RX ADMIN — FINASTERIDE 5 MG: 5 TABLET, FILM COATED ORAL at 06:00

## 2024-03-09 RX ADMIN — PANTOPRAZOLE SODIUM 40 MG: 40 INJECTION, POWDER, LYOPHILIZED, FOR SOLUTION INTRAVENOUS at 05:40

## 2024-03-09 RX ADMIN — CARVEDILOL 3.12 MG: 3.12 TABLET, FILM COATED ORAL at 09:01

## 2024-03-09 RX ADMIN — APIXABAN 5 MG: 5 TABLET, FILM COATED ORAL at 05:41

## 2024-03-09 RX ADMIN — FERROUS SULFATE TAB 325 MG (65 MG ELEMENTAL FE) 325 MG: 325 (65 FE) TAB at 09:00

## 2024-03-09 ASSESSMENT — FIBROSIS 4 INDEX: FIB4 SCORE: 2.16

## 2024-03-09 NOTE — PROGRESS NOTES
Bedside report received from off going RN/tech: saleem assumed care of patient.     Fall Risk Score: HIGH RISK  Fall risk interventions in place: Place yellow fall risk ID band on patient, Provide patient/family education based on risk assessment, Educate patient/family to call staff for assistance when getting out of bed, Place fall precaution signage outside patient door, Place patient in room close to nursing station, and Utilize bed/chair fall alarm  Bed type: Regular (Pablo Score less than 17 interventions in place)  Patient on cardiac monitor: Yes  IVF/IV medications: Not Applicable   Oxygen: How many liters 1L  Bedside sitter: Not Applicable   Isolation: Not applicable    Pt resting comfortably in bed with sling. Dressing on Left arm are clean dry intact.

## 2024-03-09 NOTE — PROGRESS NOTES
Pt discharge to [facility]. Gave report to LPN Janet Watkins picked up pt at 1330. Pt had moderate size bowel movement prior to d/c.  Tele box off and pt cleaned up. Placed in semifowlers position on 1lpm and transported to [facility] via gurney. [place]ra Packet given to paramedic as well as the oxycodone script.

## 2024-03-09 NOTE — CARE PLAN
Problem: Fall Risk  Goal: Patient will remain free from falls  Description: Target End Date:  Prior to discharge or change in level of care    Document interventions on the Aldana Hollis Fall Risk Assessment    1.  Assess for fall risk factors  2.  Implement fall precautions  Outcome: Progressing   The patient is Watcher - Medium risk of patient condition declining or worsening    Shift Goals  Clinical Goals: Bowel movement  Patient Goals: Rest, bowel movement  Family Goals: Bowel movement    Progress made toward(s) clinical / shift goals:  Bed alarm in place and call light within reach.  RN close to patient room    Patient is not progressing towards the following goals:

## 2024-03-09 NOTE — DISCHARGE PLANNING
Pt is transferring to [facility] today 1310 via [place]. Veronika at [facility] advised the transport time and  Simran advised.

## 2024-03-09 NOTE — DISCHARGE PLANNING
LMSW called [facility] to verify if they take weekend admissions. LMSW was transferred and could not get someone to answer the phone to the SNF line was ringing for 5 minutes.     LMSW called [facility] to verify if they can take pt today. [facility] is  able to take pt today at any time. Though they stated nothing after 1600.    0841 LMSW submitted Ride line transportation. Pending confirmation    0915 LMSW received confirmation for transportation to [facility] for 1310.      0935 LMSW called pt's spouse to inform her of transportation.     1025 LMSW spoke with pt about transfer. Pt was agreeable to transfer to SNF and presented IMM. LMSW received verbal for IMM due to pt's weakness.      1029 LMSW reached out to bed side nurse on transportation packet being ready.

## 2024-03-09 NOTE — DISCHARGE INSTRUCTIONS
Diet    Heart Healthy Diet    A Heart-Healthy diet includes increasing healthy fats and limiting unhealthy fats.  Focus on eating a balance of foods, including fruits and vegetables, low-fat or nonfat dairy, lean protein, nuts and legumes, whole grains, and heart-healthy oils and fats.  Monitor your salt (sodium) intake, especially if you have high blood pressure.  Lose weight if you are overweight. Losing just 5-10% of your body weight can help your overall health and prevent diseases such as diabetes and heart disease.    Diabetic Diet     A Diabetic Diet can help you control your blood glucose, lower your risk of heart disease, improve your blood pressure and maintain a healthy weight.  Eating healthy foods, low in calories, fat and carbohydrates at the same time every day can help control your blood glucose. Carbohydrates can greatly affect your blood glucose levels.  Counting carbs is important to keep your blood glucose at a healthy level, especially if you use insulin or take certain oral diabetes medicines.  Avoid alcohol as it can cause a sudden decrease in blood glucose (hypoglycemia), especially if you use insulin or take certain oral diabetes medicines.          HF Patient Discharge Instructions  Monitor your weight daily, and maintain a weight chart, to track your weight changes.   Activity as tolerated, unless your Doctor has ordered otherwise. Other activity order: as tolerated.  Follow a low fat, low cholesterol, low salt diet unless instructed otherwise by your Doctor. Read the labels on the back of food products and track your intake of fat, cholesterol and salt.   Fluid Restriction No. If a Fluid Restriction has been ordered by your Doctor, measure fluids with a measuring cup to ensure that you are not exceeding the restriction.   No smoking.  Oxygen Yes. If your Doctor has ordered that you wear Oxygen at home, it is important to wear it as ordered.  Did you receive an explanation from staff on  the importance of taking each of your medications and why it is necessary to keep taking them unless your doctor says to stop? Yes  Were all of your questions answered about how to manage your heart failure and what to do if you have increased signs and symptoms after you go home? Yes  Do you feel like your heart failure care team involved you in the care treatment plan and allowed you to make decisions regarding your care while in the hospital and addressed any discharge needs you might have? Yes    See the educational handout provided at discharge for more information on monitoring your daily weight, activity and diet. This also explains more about Heart Failure, symptoms of a flare-up and some of the tests that you have undergone.     Warning Signs of a Flare-Up include:  Swelling in the ankles or lower legs.  Shortness of breath, while at rest, or while doing normal activities.   Shortness of breath at night when in bed, or coughing in bed.   Requiring more pillows to sleep at night, or needing to sit up at night to sleep.  Feeling weak, dizzy or fatigued.     When to call your Doctor:  Call your Primary Care Physician or Cardiologist if:   You experience any pain radiating to your jaw or neck.  You have any difficulty breathing.  You experience weight gain of 3 lbs in a day or 5 lbs in a week.   You feel any palpitations or irregular heartbeats.  You become dizzy or lose consciousness.   If you have had an angiogram or had a pacemaker or AICD placed, and experience:  Bleeding, drainage or swelling at the surgical / puncture site.  Fever greater than 100.0 F  Shock from internal defibrillator.  Cool and / or numb extremities.     Please access the [facility] My HF Guide/Heart Failure Interactive Workbook:   [email or web address]

## 2024-03-09 NOTE — PROGRESS NOTES
Orthopedic PA Progress Note    Interval changes:  Patient doing well.   LUE splint and dressings are CDI  NWB LUE in sling  Mobilize with therapy when able  Cleared for DC from orthopedic standpoint pending medical optimization    ROS - Patient denies any new issues.  Denies any numbness or tingling. Pain well controlled.    /72   Pulse 71   Temp 36.3 °C (97.4 °F) (Temporal)   Resp 17   Ht 1.829 m (6')   Wt 81.8 kg (180 lb 5.4 oz)   SpO2 95%     Patient seen and examined  No acute distress  Breathing non labored  RRR  LUE: Splint and dressings CDI. Minimal flexion and extension to fingers. Initiates movement on own. Sensation intact. Cap refill <2s.     Recent Labs     03/07/24 2035 03/08/24  0426 03/09/24  0158   WBC 7.0 7.1 8.3   RBC 2.72* 2.88* 2.92*   HEMOGLOBIN 9.0* 9.3* 9.3*   HEMATOCRIT 25.8* 27.4* 27.5*   MCV 94.9 95.1 94.2   MCH 33.1* 32.3 31.8   MCHC 34.9 33.9 33.8   RDW 56.9* 57.2* 57.3*   PLATELETCT 131* 142* 174   MPV 9.9 10.4 10.1       Active Hospital Problems    Diagnosis     Chronic systolic congestive heart failure (HCC) [I50.22]      Priority: High     November 2022: Echocardiogram with normal LV size, mild concentric LVH, LVEF 25%. Mildly dilated LA and RV, enlarged RA. Moderate MR, mild TR. RVSP 20mmHg.      Hypotension [I95.9]     Left supracondylar humerus fracture, closed, initial encounter [S42.412A]     Encounter for preoperative assessment [Z01.818]     Fracture of radius [S52.90XA]     Hypotension due to drugs [I95.2]     Atrial fibrillation with RVR (HCC) [I48.91]     Anemia [D64.9]     Syncope and collapse [R55]        Assessment/Plan:  Patient doing well.   LUE splint and dressings are CDI  NWB LUE in sling  Mobilize with therapy when able  Cleared for DC from orthopedic standpoint pending medical optimization    POD#3=4 S/p  Open reduction internal fixation left humeral shaft with intramedullary nail  Open reduction internal fixation left intra-articular distal radius  fracture  Wt bearing status - NWB LUE in sling  Wound care/Drains - Dressings to be left in place to lower arm, ok to change shoulder dressings PRN with gauze/tegaderm  Future Procedures - None planned   Lovenox: Start 3/6, Duration-until ambulatory > 150'  Sutures/Staples out- 14-21 days post operatively. Removal will completed by ortho RENUKA's unless transferred.  PT/OT-initiated  Antibiotics:  Perioperative completed  DVT Prophylaxis- TEDS/SCDs/Foot pumps  Gilliam-not needed per ortho  Case Coordination for Discharge Planning - Disposition per therapy recs.

## 2024-03-09 NOTE — DISCHARGE SUMMARY
Discharge Summary    CHIEF COMPLAINT ON ADMISSION  Chief Complaint   Patient presents with    Trauma Green     Transfer from [facility] Pt found at the bottom of basement stairs, had gotten dizzy and slid down. +head strike, +LOC, +thinners       Reason for Admission  trauma green transfer / fall     Admission Date  3/4/2024    CODE STATUS  Full Code    HPI & HOSPITAL COURSE  83 female with past medical history of diabetes, A-fib on Eliquis, diabetes mellitus on insulin, HFrEF 25-30%, status post AICD presented after a fall.  Imaging noted with left humerus and radius fracture.  Repeat echocardiogram with EF around 30%, AICD interrogation unremarkable.  S/p  Open reduction internal fixation left humeral shaft with intramedullary nail and internal fixation left intra-articular distal radius fracture on 3/5 .  PT/OT recommending SNF.  GI evaluated.  Received 1 unit PRBC for anemia.  H&H remained stable above 9.      Patient seen and examined at bedside.  His vitals remained stable.  Denies any discomfort  Labs with stable H&H.  I have decreased his Coreg to 3.25 mg twice daily.  Need close blood pressure monitoring on discharge.    At the time of discharge patient remain hemodynamically stable ,asymptomatic ,labs remain unremarkable .  Patient will be discharged with close follow up with PCP .Discharge plan was discussed with patient in details .  Patient agreed with discharge plan  and  all questions answered.             Therefore, he is discharged in good and stable condition to skilled nursing facility.    The patient met 2-midnight criteria for an inpatient stay at the time of discharge.    Discharge Date  3/9/2024      FOLLOW UP ITEMS POST DISCHARGE  Monitor blood pressure closely    DISCHARGE DIAGNOSES  Principal Problem:    Left supracondylar humerus fracture, closed, initial encounter (POA: Yes)  Active Problems:    Syncope and collapse (POA: Yes)    Chronic systolic congestive heart failure (HCC) (POA:  Yes)      Overview: November 2022: Echocardiogram with normal LV size, mild concentric LVH,       LVEF 25%. Mildly dilated LA and RV, enlarged RA. Moderate MR, mild TR.       RVSP 20mmHg.    Encounter for preoperative assessment (POA: Yes)    Fracture of radius (POA: Yes)    Hypotension due to drugs (POA: Yes)    Atrial fibrillation with RVR (HCC) (POA: Yes)    Anemia (POA: Yes)    Hypotension (POA: Unknown)  Resolved Problems:    * No resolved hospital problems. *      FOLLOW UP  Future Appointments   Date Time Provider Department Center   6/6/2024  9:45 AM TREVOR Daniels [facility] None   6/6/2024  2:20 PM Sergio Garner M.D. [facility] None     [facility]  [address] [place] [address]  [phone]          MEDICATIONS ON DISCHARGE     Medication List        START taking these medications        Instructions   ferrous sulfate 325 (65 Fe) MG tablet  Start taking on: March 10, 2024   Take 1 Tablet by mouth every morning with breakfast.  Dose: 325 mg            CHANGE how you take these medications        Instructions   carvedilol 3.125 MG Tabs  What changed:   medication strength  how much to take  Commonly known as: Coreg   Take 1 Tablet by mouth 2 times a day with meals.  Dose: 3.125 mg            CONTINUE taking these medications        Instructions   apixaban 5mg Tabs  Commonly known as: Eliquis   Take 1 Tablet by mouth 2 times a day.  Dose: 5 mg     atorvastatin 80 MG tablet  Commonly known as: Lipitor   Take 80 mg by mouth every evening.  Dose: 80 mg     B-12 1000 MCG Caps   Take 1,000 mcg by mouth every day.  Dose: 1,000 mcg     clopidogrel 75 MG Tabs  Commonly known as: Plavix   Take 75 mg by mouth every day.  Dose: 75 mg     dapagliflozin propanediol 10 MG Tabs  Commonly known as: Farxiga   Take 1 Tablet by mouth every day.  Dose: 10 mg     DULoxetine 30 MG Cpep  Commonly known as: Cymbalta   Take 30 mg by mouth every day.  Dose: 30 mg     Entresto 24-26 MG Tabs  Generic  drug: sacubitril-valsartan   Take 1 Tablet by mouth 2 times a day.  Dose: 1 Tablet     finasteride 5 MG Tabs  Commonly known as: Proscar   Take 5 mg by mouth every day.  Dose: 5 mg     gabapentin 300 MG Caps  Commonly known as: Neurontin   Take 600 mg by mouth 3 times a day.  Dose: 600 mg     insulin aspart 100 UNIT/ML Soln  Commonly known as: NovoLOG   Inject 10 Units under the skin as needed for High Blood Sugar (if BS is >300).  Dose: 10 Units     metFORMIN 500 MG Tabs  Commonly known as: Glucophage   Take 500 mg by mouth every evening.  Dose: 500 mg     NON SPECIFIED   Take 1 Capsule by mouth every day. Bedrock Nerve Support  Dose: 1 Capsule     NON SPECIFIED   Take 3 Capsules by mouth every day. Balance of Nature Veggies and Fruits  Dose: 3 Capsule     spironolactone 25 MG Tabs  Commonly known as: Aldactone   Take 25 mg by mouth every day.  Dose: 25 mg     Tresiba FlexTouch 100 UNIT/ML Sopn  Generic drug: Insulin Degludec   Inject 20-40 Units under the skin every evening. If -180=20 units  180-200=30 units  >200=40 units  Dose: 20-40 Units     Trulicity 1.5 MG/0.5ML Sopn  Generic drug: Dulaglutide   Inject 0.5 mL under the skin every Saturday. On Sat  Dose: 0.5 mL            STOP taking these medications      furosemide 20 MG Tabs  Commonly known as: Lasix              Allergies  No Known Allergies    DIET  Orders Placed This Encounter   Procedures    Diet Order Diet: Level 7 - Easy to Chew; Liquid level: Level 0 - Thin; Second Modifier: (optional): Consistent CHO (Diabetic)     Standing Status:   Standing     Number of Occurrences:   1     Order Specific Question:   Diet:     Answer:   Level 7 - Easy to Chew [22]     Order Specific Question:   Liquid level     Answer:   Level 0 - Thin     Order Specific Question:   Second Modifier: (optional)     Answer:   Consistent CHO (Diabetic) [4]       ACTIVITY  As tolerated.  Weight bearing as tolerated        LABORATORY  Lab Results   Component Value Date     SODIUM 138 03/07/2024    POTASSIUM 4.0 03/07/2024    CHLORIDE 108 03/07/2024    CO2 21 03/07/2024    GLUCOSE 187 (H) 03/07/2024    BUN 29 (H) 03/07/2024    CREATININE 0.85 03/07/2024        Lab Results   Component Value Date    WBC 8.3 03/09/2024    HEMOGLOBIN 9.3 (L) 03/09/2024    HEMATOCRIT 27.5 (L) 03/09/2024    PLATELETCT 174 03/09/2024        Total time of the discharge process exceeds 37  minutes.

## 2024-03-09 NOTE — CARE PLAN
The patient is Stable - Low risk of patient condition declining or worsening    Shift Goals  Clinical Goals: Bowel movement  Patient Goals: Rest, bowel movement  Family Goals: Bowel movement    Progress made toward(s) clinical / shift goals:  Patient given MOM and suppository and he was able to pass a large bowel movement. Wife visited to day and was updated on plan of care; patient accepted to snf and able to transfer tomorrow.     Patient is not progressing towards the following goals:      Problem: Knowledge Deficit - Standard  Goal: Patient and family/care givers will demonstrate understanding of plan of care, disease process/condition, diagnostic tests and medications  Outcome: Progressing     Problem: Skin Integrity  Goal: Skin integrity is maintained or improved  Outcome: Progressing

## 2024-03-28 ENCOUNTER — APPOINTMENT (OUTPATIENT)
Dept: CARDIOLOGY | Facility: MEDICAL CENTER | Age: 84
End: 2024-03-28
Attending: STUDENT IN AN ORGANIZED HEALTH CARE EDUCATION/TRAINING PROGRAM
Payer: MEDICARE

## 2024-06-06 ENCOUNTER — OFFICE VISIT (OUTPATIENT)
Dept: CARDIOLOGY | Facility: PHYSICIAN GROUP | Age: 84
End: 2024-06-06
Payer: MEDICARE

## 2024-06-06 ENCOUNTER — NON-PROVIDER VISIT (OUTPATIENT)
Dept: CARDIOLOGY | Facility: PHYSICIAN GROUP | Age: 84
End: 2024-06-06
Payer: MEDICARE

## 2024-06-06 VITALS
OXYGEN SATURATION: 94 % | WEIGHT: 160 LBS | HEIGHT: 72 IN | BODY MASS INDEX: 21.67 KG/M2 | DIASTOLIC BLOOD PRESSURE: 52 MMHG | SYSTOLIC BLOOD PRESSURE: 96 MMHG | RESPIRATION RATE: 15 BRPM | HEART RATE: 65 BPM

## 2024-06-06 VITALS
SYSTOLIC BLOOD PRESSURE: 96 MMHG | HEART RATE: 65 BPM | OXYGEN SATURATION: 94 % | DIASTOLIC BLOOD PRESSURE: 52 MMHG | HEIGHT: 72 IN | BODY MASS INDEX: 21.67 KG/M2 | WEIGHT: 160 LBS | RESPIRATION RATE: 15 BRPM

## 2024-06-06 DIAGNOSIS — I48.91 ATRIAL FIBRILLATION WITH RVR (HCC): ICD-10-CM

## 2024-06-06 DIAGNOSIS — I50.22 CHRONIC SYSTOLIC CONGESTIVE HEART FAILURE (HCC): ICD-10-CM

## 2024-06-06 DIAGNOSIS — I48.0 PAROXYSMAL ATRIAL FIBRILLATION (HCC): ICD-10-CM

## 2024-06-06 DIAGNOSIS — Z95.810 AICD (AUTOMATIC CARDIOVERTER/DEFIBRILLATOR) PRESENT: ICD-10-CM

## 2024-06-06 DIAGNOSIS — I10 ESSENTIAL HYPERTENSION: ICD-10-CM

## 2024-06-06 DIAGNOSIS — E78.2 MIXED HYPERLIPIDEMIA: ICD-10-CM

## 2024-06-06 PROCEDURE — 3074F SYST BP LT 130 MM HG: CPT | Performed by: INTERNAL MEDICINE

## 2024-06-06 PROCEDURE — 3078F DIAST BP <80 MM HG: CPT | Performed by: INTERNAL MEDICINE

## 2024-06-06 PROCEDURE — 93289 INTERROG DEVICE EVAL HEART: CPT | Performed by: NURSE PRACTITIONER

## 2024-06-06 PROCEDURE — 99214 OFFICE O/P EST MOD 30 MIN: CPT | Performed by: INTERNAL MEDICINE

## 2024-06-06 ASSESSMENT — ENCOUNTER SYMPTOMS
ALTERED MENTAL STATUS: 0
CONSTIPATION: 0
WEIGHT LOSS: 0
DIZZINESS: 0
CLAUDICATION: 0
NAUSEA: 0
BACK PAIN: 0
DYSPNEA ON EXERTION: 0
DIARRHEA: 0
SHORTNESS OF BREATH: 0
FLANK PAIN: 0
BLURRED VISION: 0
HEARTBURN: 0
ABDOMINAL PAIN: 0
VOMITING: 0
DECREASED APPETITE: 0
FEVER: 0
DEPRESSION: 0
IRREGULAR HEARTBEAT: 0
PALPITATIONS: 0
NEAR-SYNCOPE: 0
PND: 0
WEIGHT GAIN: 0
SYNCOPE: 0
ORTHOPNEA: 0
COUGH: 0

## 2024-06-06 ASSESSMENT — FIBROSIS 4 INDEX
FIB4 SCORE: 2.16
FIB4 SCORE: 2.16

## 2024-06-06 NOTE — PROGRESS NOTES
Cardiology Note    HF    History of Present Illness: Nolberto Severino is a 83 y.o. 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    Doing well. No active cardiac complaints. Compliant with medications and denies adverse effects. Bleeding largely resolved he says. Completed stress testing. [facility]    [facility] admission 11/2020 for pneumonia, covid-19 positive.  [facility] ED 6/9/23 visit for rectal bleed. Noted resolved. Found stable h/h.    Review of Systems   Constitutional: Negative for decreased appetite, fever, malaise/fatigue, weight gain and weight loss.   HENT:  Negative for congestion and nosebleeds.    Eyes:  Negative for blurred vision.   Cardiovascular:  Negative for chest pain, claudication, dyspnea on exertion, irregular heartbeat, leg swelling, near-syncope, orthopnea, palpitations, paroxysmal nocturnal dyspnea and syncope.   Respiratory:  Negative for cough and shortness of breath.    Endocrine: Negative for cold intolerance and heat intolerance.   Skin:  Negative for rash.   Musculoskeletal:  Negative for back pain.   Gastrointestinal:  Negative for abdominal pain, constipation, diarrhea, heartburn, melena, nausea and vomiting.   Genitourinary:  Negative for dysuria, flank pain and hematuria.   Neurological:  Negative for dizziness.   Psychiatric/Behavioral:  Negative for altered mental status and depression.          Past Medical History:   Diagnosis Date    Adverse effect of anesthesia 2010    respiratory arrest 3x during surgery    Anesthesia     Bradycardia     Cataract     Chronic anticoagulation     Diabetes (HCC)     HFrEF (heart failure with reduced ejection fraction) ([facility]) 11/2022    Echocardiogram with normal LV size, mild concentric LVH, LVEF 25%. Mildly dilated LA and RV, enlarged RA. Moderate MR, mild TR. RVSP 20mmHg.    High cholesterol      Hypertension     Indigestion     Myocardial infarct (HCC) 07/2022    Neuropathy     Paroxysmal atrial fibrillation ([facility])     Stroke ([facility])     Syncope          Past Surgical History:   Procedure Laterality Date    PB OPEN TX RADIAL & ULNAR SHAFT FX FIX RADIUS A* Left 3/5/2024    Procedure: ORIF, FRACTURE, LEFT DISTAL RADIUS;  Surgeon: Mateus Peralta M.D.;  Location: SURGERY [facility];  Service: Orthopedics    HUMERUS NAILING INTRAMEDULLARY Left 3/5/2024    Procedure: OPEN REDUCTION INTERNAL FIXATION LEFT HUMERUS  WITH INTRAMEDULLARY NAIL;  Surgeon: Mateus Peralta M.D.;  Location: SURGERY [facility];  Service: Orthopedics    AICD IMPLANT Left 01/09/2023    Upgrade to [employer] Shellie KEATING [id number] implanted by Dr. Clay.    PACEMAKER INSERTION Left 02/08/2019    Medtronic Advisa SR MRI [id number] implanted in [place].    HIP REPLACEMENT, TOTAL Left     INTRAOCULAR LENS IMPLANT Bilateral          Current Outpatient Medications   Medication Sig Dispense Refill    ferrous sulfate 325 (65 Fe) MG tablet Take 1 Tablet by mouth every morning with breakfast.      carvedilol (COREG) 3.125 MG Tab Take 1 Tablet by mouth 2 times a day with meals.      DULoxetine (CYMBALTA) 30 MG Cap DR Particles Take 30 mg by mouth every day.      Cyanocobalamin (B-12) 1000 MCG Cap Take 1,000 mcg by mouth every day.      NON SPECIFIED Take 1 Capsule by mouth every day. Bedrock Nerve Support      NON SPECIFIED Take 3 Capsules by mouth every day. Balance of Nature Veggies and Fruits      insulin aspart (NOVOLOG) 100 UNIT/ML Solution Inject 10 Units under the skin as needed for High Blood Sugar (if BS is >300).      finasteride (PROSCAR) 5 MG Tab Take 5 mg by mouth every day.      apixaban (ELIQUIS) 5mg Tab Take 1 Tablet by mouth 2 times a day. 180 Tablet 3    Insulin Degludec (TRESIBA FLEXTOUCH) 100 UNIT/ML Solution Pen-injector Inject 20-40 Units under the skin every evening. If -180=20 units  180-200=30 units  >200=40 units       sacubitril-valsartan (ENTRESTO) 24-26 MG Tab Take 1 Tablet by mouth 2 times a day. 90 Tablet 3    TRULICITY 1.5 MG/0.5ML Solution Pen-injector Inject 0.5 mL under the skin every Saturday. On Sat      atorvastatin (LIPITOR) 80 MG tablet Take 80 mg by mouth every evening.      spironolactone (ALDACTONE) 25 MG Tab Take 25 mg by mouth every day.      gabapentin (NEURONTIN) 300 MG Cap Take 600 mg by mouth 3 times a day.      dapagliflozin propanediol (FARXIGA) 10 MG Tab Take 1 Tablet by mouth every day. 90 Tablet 3    metFORMIN (GLUCOPHAGE) 500 MG Tab Take 500 mg by mouth every evening.       No current facility-administered medications for this visit.         No Known Allergies      History reviewed. No pertinent family history.      Social History     Socioeconomic History    Marital status:      Spouse name: Not on file    Number of children: Not on file    Years of education: Not on file    Highest education level: Not on file   Occupational History    Not on file   Tobacco Use    Smoking status: Former     Current packs/day: 0.00     Types: Cigarettes, Pipe     Quit date:      Years since quittin.4    Smokeless tobacco: Never   Vaping Use    Vaping status: Never Used   Substance and Sexual Activity    Alcohol use: Yes     Comment: rarely    Drug use: Never    Sexual activity: Not on file   Other Topics Concern    Not on file   Social History Narrative    Not on file     Social Determinants of Health     Financial Resource Strain: Not on file   Food Insecurity: Not on file   Transportation Needs: Not on file   Physical Activity: Not on file   Stress: Not on file   Social Connections: Not on file   Intimate Partner Violence: Not on file   Housing Stability: Not on file         Physical Exam:  Ambulatory Vitals  BP 96/52 (BP Location: Left arm, Patient Position: Sitting, BP Cuff Size: Adult)   Pulse 65   Resp 15   Ht 1.829 m (6')   Wt 72.6 kg (160 lb)   SpO2 94%    BP Readings from Last 4  Encounters:   06/06/24 96/52   06/06/24 96/52   03/09/24 116/72   12/21/23 112/52     Weight/BMI:   Vitals:    06/06/24 0927   BP: 96/52   Weight: 72.6 kg (160 lb)   Height: 1.829 m (6')    Body mass index is 21.7 kg/m².  Wt Readings from Last 4 Encounters:   06/06/24 72.6 kg (160 lb)   06/06/24 72.6 kg (160 lb)   03/27/24 78 kg (172 lb)   03/09/24 81.8 kg (180 lb 5.4 oz)       Physical Exam  Constitutional:       General: He is not in acute distress.  HENT:      Head: Normocephalic and atraumatic.   Eyes:      Conjunctiva/sclera: Conjunctivae normal.      Pupils: Pupils are equal, round, and reactive to light.   Neck:      Vascular: No JVD.   Cardiovascular:      Rate and Rhythm: Normal rate and regular rhythm.      Heart sounds: Normal heart sounds. No murmur heard.     No friction rub. No gallop.   Pulmonary:      Effort: Pulmonary effort is normal. No respiratory distress.      Breath sounds: Normal breath sounds. No wheezing or rales.   Chest:      Chest wall: No tenderness.   Abdominal:      General: Bowel sounds are normal. There is no distension.      Palpations: Abdomen is soft.   Musculoskeletal:      Cervical back: Normal range of motion and neck supple.   Skin:     General: Skin is warm and dry.   Neurological:      Mental Status: He is alert and oriented to person, place, and time.   Psychiatric:         Mood and Affect: Affect normal.         Judgment: Judgment normal.         Lab Data Review:  Lab Results   Component Value Date/Time    CHOLSTRLTOT 102 07/12/2023 12:00 AM    LDL 51 07/12/2023 12:00 AM    HDL 36 (A) 07/12/2023 12:00 AM    TRIGLYCERIDE 69 07/12/2023 12:00 AM       Lab Results   Component Value Date/Time    SODIUM 138 03/07/2024 12:43 PM    POTASSIUM 4.0 03/07/2024 12:43 PM    CHLORIDE 108 03/07/2024 12:43 PM    CO2 21 03/07/2024 12:43 PM    GLUCOSE 187 (H) 03/07/2024 12:43 PM    BUN 29 (H) 03/07/2024 12:43 PM    CREATININE 0.85 03/07/2024 12:43 PM     CrCl cannot be calculated (Patient's  "most recent lab result is older than the maximum 7 days allowed.).  Lab Results   Component Value Date/Time    ALKPHOSPHAT 50 03/04/2024 12:42 AM    ASTSGOT 20 03/04/2024 12:42 AM    ALTSGPT 19 03/04/2024 12:42 AM    TBILIRUBIN 1.0 03/04/2024 12:42 AM      Lab Results   Component Value Date/Time    WBC 8.3 03/09/2024 01:58 AM     No results found for: \"HBA1C\"  No components found for: \"TROP\"    10/2019  a1c 8.0    Outside labs 1/2020  , HDL 37, trip 126, tot 198  Microalb/cr 1.86 wnl    Cardiac Imaging and Procedures Review:      EKG 3/16/20 reviewed by me AF CP  outside EKG 12/2019 - AF,     Outside TTE 2/2019  -LVEF 55%  -mild AR  -mild MR  -mild TR  -RVSP 24 mmHg  -right atrium and left atrium dilated    [facility] cbc 6/9/23  -hgb 14    TTE 11/2022  CONCLUSIONS  No prior study is available for comparison.   Reduced left ventricular systolic function. The left ventricular   ejection fraction is visually estimated to be 25%. Inferolateral walls   akinetic.  Moderate mitral regurgitation.  Mild aortic insufficiency.  Estimated right ventricular systolic pressure is 20 mmHg.  Normal aortic root for body surface area. The ascending aorta diameter   is 3.1 cm.    TTE 4/2023 [facility]  -LVEF 25-30%, reduced RVF, mild LAE, mild AR, mild MR, mild to mod TR    MPI spect 1/19/24  -normal perfusion anterior and septal walls with fixed defect remaining myocardium    Medical Decision Making:  Problem List Items Addressed This Visit       Essential hypertension    Paroxysmal atrial fibrillation (HCC)    Mixed hyperlipidemia    AICD (automatic cardioverter/defibrillator) present    Chronic systolic congestive heart failure (HCC)    Atrial fibrillation with RVR ([facility])       CAD fixed defects on MPI - continue doac no need additional antiplatelets. Dc plavix. Continue statin. Stable.     HFrEF stg C NYHA II - stable. Continue HFOMT.     GIB now stable follow up GI.     Persistent AF - chadsvasc 4 - eliquis 5mg bid for cva " prevention. Rate control.    HTN - goal <130/80. Chronic. Controlled. Continue current regimen.    10 year risk ascvd high / DM - high intensity statin. Annual lipids with PCP.    It was my pleasure to meet with Mr. Severino.

## 2024-06-06 NOTE — PROGRESS NOTES
Device is working normally.  No device therapy.  He is V-paced 4.7% of total time.  Normal sensing and capture of RV lead; stable impedances. Battery longevity is 11.0 years.  No changes are made today.    He does see Dr. Garner today too.    Follow-up in 6 months for next AICD check with me.

## 2024-12-18 LAB
CHOLEST SERPL-MCNC: 113 MG/DL
HDLC SERPL-MCNC: 34 MG/DL
LDLC SERPL CALC-MCNC: 66 MG/DL
TRIGL SERPL-MCNC: 65 MG/DL

## 2024-12-19 ENCOUNTER — NON-PROVIDER VISIT (OUTPATIENT)
Dept: CARDIOLOGY | Facility: PHYSICIAN GROUP | Age: 84
End: 2024-12-19
Payer: MEDICARE

## 2024-12-19 VITALS
SYSTOLIC BLOOD PRESSURE: 100 MMHG | OXYGEN SATURATION: 97 % | BODY MASS INDEX: 21.26 KG/M2 | DIASTOLIC BLOOD PRESSURE: 62 MMHG | HEIGHT: 72 IN | WEIGHT: 157 LBS | RESPIRATION RATE: 14 BRPM | HEART RATE: 88 BPM

## 2024-12-19 DIAGNOSIS — I48.0 PAROXYSMAL ATRIAL FIBRILLATION (HCC): ICD-10-CM

## 2024-12-19 DIAGNOSIS — Z79.01 CHRONIC ANTICOAGULATION: ICD-10-CM

## 2024-12-19 DIAGNOSIS — I50.22 CHRONIC SYSTOLIC CONGESTIVE HEART FAILURE (HCC): ICD-10-CM

## 2024-12-19 DIAGNOSIS — Z95.810 AICD (AUTOMATIC CARDIOVERTER/DEFIBRILLATOR) PRESENT: ICD-10-CM

## 2024-12-19 PROCEDURE — 93289 INTERROG DEVICE EVAL HEART: CPT | Performed by: NURSE PRACTITIONER

## 2024-12-19 ASSESSMENT — FIBROSIS 4 INDEX: FIB4 SCORE: 2.19

## 2024-12-19 NOTE — PROGRESS NOTES
Device is working normally.  No device therapy.  Normal sensing and capture of RV lead; stable impedances. Battery longevity is 10.5 years.  No changes are made today.    He has had trouble getting Eliquis, but he is still taking it. New Rx is sent to [facility].    He does see Dr. Garner next saman (January 2025).    Follow-up in 6 months for next AICD check with me.

## 2025-04-06 ENCOUNTER — NON-PROVIDER VISIT (OUTPATIENT)
Dept: CARDIOLOGY | Facility: MEDICAL CENTER | Age: 85
End: 2025-04-06
Payer: MEDICARE

## 2025-04-07 ENCOUNTER — APPOINTMENT (OUTPATIENT)
Dept: CARDIOLOGY | Facility: PHYSICIAN GROUP | Age: 85
End: 2025-04-07
Payer: MEDICARE

## 2025-04-07 PROCEDURE — 93295 DEV INTERROG REMOTE 1/2/MLT: CPT | Performed by: INTERNAL MEDICINE

## 2025-04-07 NOTE — CARDIAC REMOTE MONITOR - SCAN
Device transmission reviewed. Device demonstrated appropriate function.       Electronically Signed by: Luis Miguel Julian M.D.    4/10/2025  4:18 PM

## 2025-04-22 ENCOUNTER — APPOINTMENT (OUTPATIENT)
Dept: CARDIOLOGY | Facility: PHYSICIAN GROUP | Age: 85
End: 2025-04-22
Payer: MEDICARE

## 2025-04-22 VITALS
HEART RATE: 74 BPM | DIASTOLIC BLOOD PRESSURE: 62 MMHG | RESPIRATION RATE: 12 BRPM | SYSTOLIC BLOOD PRESSURE: 110 MMHG | BODY MASS INDEX: 21.29 KG/M2 | OXYGEN SATURATION: 95 % | HEIGHT: 72 IN

## 2025-04-22 DIAGNOSIS — I50.20 ACC/AHA STAGE C SYSTOLIC CONGESTIVE HEART FAILURE (HCC): ICD-10-CM

## 2025-04-22 DIAGNOSIS — I48.0 PAROXYSMAL ATRIAL FIBRILLATION (HCC): ICD-10-CM

## 2025-04-22 DIAGNOSIS — Z95.810 AICD (AUTOMATIC CARDIOVERTER/DEFIBRILLATOR) PRESENT: ICD-10-CM

## 2025-04-22 DIAGNOSIS — I10 ESSENTIAL HYPERTENSION: ICD-10-CM

## 2025-04-22 DIAGNOSIS — Z79.01 CHRONIC ANTICOAGULATION: ICD-10-CM

## 2025-04-22 DIAGNOSIS — D68.69 HYPERCOAGULABLE STATE DUE TO PAROXYSMAL ATRIAL FIBRILLATION (HCC): ICD-10-CM

## 2025-04-22 DIAGNOSIS — I50.22 CHRONIC SYSTOLIC CONGESTIVE HEART FAILURE (HCC): ICD-10-CM

## 2025-04-22 DIAGNOSIS — E78.2 MIXED HYPERLIPIDEMIA: ICD-10-CM

## 2025-04-22 DIAGNOSIS — Z91.89 10 YEAR RISK OF MI OR STROKE 7.5% OR GREATER: ICD-10-CM

## 2025-04-22 DIAGNOSIS — I48.0 HYPERCOAGULABLE STATE DUE TO PAROXYSMAL ATRIAL FIBRILLATION (HCC): ICD-10-CM

## 2025-04-22 PROCEDURE — 3078F DIAST BP <80 MM HG: CPT | Performed by: INTERNAL MEDICINE

## 2025-04-22 PROCEDURE — 99214 OFFICE O/P EST MOD 30 MIN: CPT | Performed by: INTERNAL MEDICINE

## 2025-04-22 PROCEDURE — G2211 COMPLEX E/M VISIT ADD ON: HCPCS | Performed by: INTERNAL MEDICINE

## 2025-04-22 PROCEDURE — 3074F SYST BP LT 130 MM HG: CPT | Performed by: INTERNAL MEDICINE

## 2025-04-22 RX ORDER — SACUBITRIL AND VALSARTAN 24; 26 MG/1; MG/1
1 TABLET, FILM COATED ORAL 2 TIMES DAILY
Qty: 180 TABLET | Refills: 3 | Status: SHIPPED | OUTPATIENT
Start: 2025-04-22

## 2025-04-22 RX ORDER — FUROSEMIDE 20 MG/1
20 TABLET ORAL
Qty: 90 TABLET | Refills: 3 | Status: SHIPPED | OUTPATIENT
Start: 2025-04-22

## 2025-04-22 RX ORDER — DAPAGLIFLOZIN 10 MG/1
10 TABLET, FILM COATED ORAL DAILY
Qty: 90 TABLET | Refills: 3 | Status: SHIPPED | OUTPATIENT
Start: 2025-04-22

## 2025-04-22 NOTE — LETTER
PROCEDURE/SURGERY CLEARANCE FORM      Encounter Date: 4/22/2025    Patient: Nolberto Severino  YOB: 1940    CARDIOLOGIST:  Arthur Felton M.D.    REFERRING DOCTOR:  Surgery      The above patient is cleared to have the following procedure/surgery: EGD and dilation                                           Additional comments: He can proceed with the proposed procedure or surgery from a cardiac standpoint, no modifiable cardiovascular risk, no further cardiac testing required, hold anticoagulation as necessary, resume as soon as possible typically when patient is able to take oral medications.    It is my pleasure to participate in the care of Mr. Severino.  Please do not hesitate to contact me with questions or concerns. [facility] Cardiology is available 24/7 for consultative services at [phone] in the perioperative period.    Electronically Signed    Arthur Felton MD PhD FAC  Cardiologist [facility]

## 2025-04-22 NOTE — PROGRESS NOTES
Chief Complaint   Patient presents with    Hypertension     F/V DX: Essential hypertension    Bradycardia     F/V DX: Bradycardia    Atrial Fibrillation     F/V DX: Paroxysmal atrial fibrillation (HCC)       Subjective     Nolberto Severino is a 84 y.o. 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 Mildly dilated LA and RV, enlarged RA. Moderate MR, mild TR. RVSP 20mmHg.    High cholesterol     Hypertension     Indigestion     Myocardial infarct ([facility]) 07/2022    Neuropathy     Paroxysmal atrial fibrillation ([facility])     Stroke ([facility])     Syncope      Past Surgical History:   Procedure Laterality Date    PB OPEN TX RADIAL & ULNAR SHAFT FX FIX RADIUS A* Left 3/5/2024    Procedure: ORIF, FRACTURE, LEFT DISTAL RADIUS;  Surgeon: Mateus Peralta M.D.;  Location: SURGERY [facility];  Service: Orthopedics    HUMERUS NAILING INTRAMEDULLARY Left 3/5/2024    Procedure: OPEN REDUCTION INTERNAL FIXATION LEFT HUMERUS  WITH INTRAMEDULLARY NAIL;  Surgeon: Mateus Peralta M.D.;  Location: SURGERY [facility];  Service: Orthopedics    AICD IMPLANT Left 01/09/2023    Upgrade to Elías Hensley DR [id number] implanted by Dr. Clay.    PACEMAKER INSERTION Left 02/08/2019    [employer] Advisa SR MRI [id number] implanted in [place].    HIP REPLACEMENT, TOTAL Left     INTRAOCULAR LENS IMPLANT Bilateral      History reviewed. No pertinent family history.  Social History     Socioeconomic History    Marital status:       Spouse name: Not on file    Number of children: Not on file    Years of education: Not on file    Highest education level: Not on file   Occupational History    Not on file   Tobacco Use    Smoking status: Former     Current packs/day: 0.00     Types: Cigarettes, Pipe     Quit date:      Years since quittin.3    Smokeless tobacco: Never   Vaping Use    Vaping status: Never Used   Substance and Sexual Activity    Alcohol use: Yes     Comment: rarely    Drug use: Never    Sexual activity: Not on file   Other Topics Concern    Not on file   Social History Narrative    Not on file     Social Drivers of Health     Financial Resource Strain: Not on file   Food Insecurity: Not on file   Transportation Needs: Not on file   Physical Activity: Not on file   Stress: Not on file   Social Connections: Not on file   Intimate Partner Violence: Not on file   Housing Stability: Not on file     No Known Allergies  Outpatient Encounter Medications as of 2025   Medication Sig Dispense Refill    apixaban (ELIQUIS) 5mg Tab Take 1 Tablet by mouth 2 times a day. 200 Tablet 3    DULoxetine (CYMBALTA) 30 MG Cap DR Particles Take 30 mg by mouth every day.      Cyanocobalamin (B-12) 1000 MCG Cap Take 1,000 mcg by mouth every day.      NON SPECIFIED Take 3 Capsules by mouth every day. Balance of Nature Veggies and Fruits      Insulin Degludec (TRESIBA FLEXTOUCH) 100 UNIT/ML Solution Pen-injector Inject 20-40 Units under the skin every evening. If -180=20 units  180-200=30 units  >200=40 units      sacubitril-valsartan (ENTRESTO) 24-26 MG Tab Take 1 Tablet by mouth 2 times a day. 90 Tablet 3    TRULICITY 1.5 MG/0.5ML Solution Pen-injector Inject 0.5 mL under the skin every Saturday. On Sat      atorvastatin (LIPITOR) 80 MG tablet Take 80 mg by mouth every evening.      spironolactone (ALDACTONE) 25 MG Tab Take 25 mg by mouth every day.      gabapentin (NEURONTIN) 300 MG Cap Take 600 mg by mouth 3 times a day.       dapagliflozin propanediol (FARXIGA) 10 MG Tab Take 1 Tablet by mouth every day. 90 Tablet 3    metFORMIN (GLUCOPHAGE) 500 MG Tab Take 500 mg by mouth every evening.      [DISCONTINUED] carvedilol (COREG) 3.125 MG Tab Take 1 Tablet by mouth 2 times a day with meals. (Patient not taking: Reported on 4/22/2025)      NON SPECIFIED Take 1 Capsule by mouth every day. Bedrock Nerve Support (Patient not taking: Reported on 4/22/2025)      insulin aspart (NOVOLOG) 100 UNIT/ML Solution Inject 10 Units under the skin as needed for High Blood Sugar (if BS is >300). (Patient not taking: Reported on 4/22/2025)       No facility-administered encounter medications on file as of 4/22/2025.     ROS           Objective     /62 (BP Location: Left arm, Patient Position: Sitting, BP Cuff Size: Adult)   Pulse 74   Resp 12   Ht 1.829 m (6')   SpO2 95%   BMI 21.29 kg/m²     Physical Exam  Constitutional:       General: He is not in acute distress.     Appearance: He is not diaphoretic.   Eyes:      General: No scleral icterus.  Neck:      Vascular: No JVD.   Cardiovascular:      Rate and Rhythm: Normal rate.      Heart sounds: Normal heart sounds. No murmur heard.     No friction rub. No gallop.   Pulmonary:      Effort: No respiratory distress.      Breath sounds: No wheezing or rales.   Abdominal:      General: Bowel sounds are normal.      Palpations: Abdomen is soft.   Musculoskeletal:      Right lower leg: No edema.      Left lower leg: No edema.   Skin:     Findings: No rash.   Neurological:      Mental Status: He is alert. Mental status is at baseline.   Psychiatric:         Mood and Affect: Mood normal.            We reviewed in person the most recent labs  Recent Results (from the past 30 weeks)   Lipid Profile    Collection Time: 12/18/24 12:00 AM   Result Value Ref Range    Cholesterol,Tot 113     Triglycerides 65     HDL 34     LDL 66          Assessment & Plan     1. Essential hypertension        2. Paroxysmal atrial  fibrillation (HCC)        3. Chronic anticoagulation        4. Mixed hyperlipidemia        5. AICD (automatic cardioverter/defibrillator) present        6. Chronic systolic congestive heart failure (HCC)        7. 10 year risk of MI or stroke 7.5% or greater        8. ACC/AHA stage C systolic congestive heart failure (HCC)        9. Hypercoagulable state due to paroxysmal atrial fibrillation (HCC)            Medical Decision Making: Today's Assessment/Status/Plan:      It was my pleasure to meet with Mr. Severino.    We addressed the management of hypertension at today's visit. Blood pressure is well controlled.  We specifically assessed the labs on hypertension treatment    We addressed the management of dyslipidemia at today's visit. He is on appropriate lipid lowering medication.    We addressed the management of atrial fibrillation.  He is on proper anticoagulation and rate or rhythm control as appropriate.  We addressed the potential side effects and laboratory follow-up for these medications.    We addressed the management of chronic anticoagulation at today's visit. He understands the risks and benefits of chronic anticoagulation.  We reviewed and verified the results of annual testing for anemia and kidney function.      We addressed the management of congestive heart failure.  We addressed the potential side effects and laboratory follow-up for these medications. He is on proper mineralacorticoid, angiotensin/ace inhibition with neprilysin inhibition, SGLTs inhibitor and beta-blockers if appropriate.  We addressed the potential side effects and regular laboratory follow-up for these medications.    For esophageal dilation He can proceed with the proposed procedure or surgery from a cardiac standpoint, no modifiable cardiovascular risk, no further cardiac testing required, hold anticoagulation as necessary, resume as soon as possible typically when patient is able to take oral medications.    It is my pleasure  to participate in the care of Mr. Severino.  Please do not hesitate to contact me with questions or concerns. [facility] Cardiology is available 24/7 for consultative services at [phone] in the perioperative period.      I will see Mr. Severino back in 1 year time and encouraged him to follow up with us over the phone or electronically using my MyChart as issues arise.    It is my pleasure to participate in the care of Mr. Severino.  Please do not hesitate to contact me with questions or concerns.    Arthur Felton MD PhD FAC  Cardiologist [facility] V[facility]    Please note that this dictation was created using voice recognition software. There may be errors I did not discover before finalizing the note.     () Today's E/M visit is associated with medical care services that serve as the continuing focal point for all needed health care services and/or with medical care services that  are part of ongoing care related to a patient's single, serious condition, or a complex condition: This includes  furnishing services to patients on an ongoing basis that result in care that is personalized  to the patient. The services result in a comprehensive, longitudinal, and continuous  relationship with the patient and involve delivery of team-based care that is accessible, coordinated with other practitioners and providers, and integrated with the broader health  care landscape.

## 2025-07-06 ENCOUNTER — NON-PROVIDER VISIT (OUTPATIENT)
Dept: CARDIOLOGY | Facility: MEDICAL CENTER | Age: 85
End: 2025-07-06
Payer: MEDICARE

## 2025-07-06 PROCEDURE — 93295 DEV INTERROG REMOTE 1/2/MLT: CPT | Performed by: INTERNAL MEDICINE

## 2025-07-08 NOTE — CARDIAC REMOTE MONITOR - SCAN
Device transmission reviewed. Device demonstrated appropriate function.       Electronically Signed by: Luis Miguel Julian M.D.    7/9/2025  5:36 PM     [de-identified] : Mr. ADAIR PERRY is a 63 year old male comes in today for follow up. He states that he is feeling well and has no complaints at this time. He is s/p left knee replacement and is about to go for right knee replacement. He denies abdominal pain, N/V/C/D.  He has NAFLD and a history of hepatitis C. He reports being diagnosed with hepatitis C in 2000. He reports being treated by Dr. Joshua Hastings at about the time of diagnosis with one year pegylated interferon, ribavirin and amantadine. He achieved sustained virologic response. 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 He reports having a history of persistently abnormal ALT post HCV treatment. FibroScan October 1, 2020 shows F4, S2 disease Fibroscan test from 10/18/19 showed F2, S3 Fibroscan test from 8/24/18 showed F1-F2, S3. Fibroscan performed on February 19, 2016 showed F3-4 disease. Colonoscopy from 9/28/19 was normal  EGD from May 25, 2021: Normal esophagus, normal stomach, duodenitis, normal second portion of duodenum. Abdo US from 3/12/19 showed fatty liver, no ascites MRE: 12/2/2020: Stage 1-2 fibrosis  Sep 13, 2023: On the magnetic resonance elastography (MRE) conducted on July 23rd, it was observed that the patient had moderate to severe steatosis, accompanied by hepatic stiffness measuring between 3.5-4 kPa, indicative of stage 2-3 fibrosis. The patient has proactively implemented dietary modifications and has successfully shed 15 pounds since the previous appointment. Additionally, it's noteworthy that the patient underwent knee replacement surgery nine weeks ago, initially restricting his mobility, but he has since regained the ability to walk.  Dec 6, 2023: Patient reports overall well-being with no immediate concerns. Liver function tests (LFTs) remain consistently elevated, specifically ALT at 51. The patient continues to struggle with maintaining dietary and lifestyle changes, leading to sustained high weight. Additionally, there is cramping associated with statin use.

## 2025-08-07 ENCOUNTER — APPOINTMENT (OUTPATIENT)
Dept: CARDIOLOGY | Facility: PHYSICIAN GROUP | Age: 85
End: 2025-08-07
Payer: MEDICARE

## (undated) DEVICE — GLOVE BIOGEL SZ 7.5 SURGICAL PF LTX - (50PR/BX 4BX/CA)

## (undated) DEVICE — CHLORAPREP 26 ML APPLICATOR - ORANGE TINT(25/CA)

## (undated) DEVICE — GLOVE BIOGEL PI INDICATOR SZ 8.0 SURGICAL PF LF -(50/BX 4BX/CA)

## (undated) DEVICE — BIT DRILL DIA2.6MM SCALED FOR VARIAX 2 WRIST FUSION LOCKING PLATE SYSTEM

## (undated) DEVICE — LACTATED RINGERS INJ 1000 ML - (14EA/CA 60CA/PF)

## (undated) DEVICE — SUTURE 3-0 ETHILON FS-1 - (36/BX) 30 INCH

## (undated) DEVICE — MEDICINE CUP STERILE 2 OZ - (100/CA)

## (undated) DEVICE — BANDAGE ELASTIC 4 HONEYCOMB - 4"X5YD LF (20/CA)"

## (undated) DEVICE — SENSOR OXIMETER ADULT SPO2 RD SET (20EA/BX)

## (undated) DEVICE — PACK MAJOR ORTHO - (2EA/CA)

## (undated) DEVICE — PADDING CAST 4 IN STERILE - 4 X 4 YDS (24/CA)

## (undated) DEVICE — DRILL BIT 3.5X130MM AO STERILE

## (undated) DEVICE — SUCTION INSTRUMENT YANKAUER BULBOUS TIP W/O VENT (50EA/CA)

## (undated) DEVICE — SODIUM CHL IRRIGATION 0.9% 1000ML (12EA/CA)

## (undated) DEVICE — SUTURE 3-0 VICRYL PLUS SH - 27 INCH (36/BX)

## (undated) DEVICE — GOWN SURGEONS X-LARGE - DISP. (30/CA)

## (undated) DEVICE — DRAPE U SPLIT IMP 54 X 76 - (24/CA)

## (undated) DEVICE — SYRINGE SAFETY TB 1 ML 27 GA X 1/2 IN (100/BX 4BX/CA)

## (undated) DEVICE — COVER LIGHT HANDLE ALC PLUS DISP (18EA/BX)

## (undated) DEVICE — SUTURE GENERAL

## (undated) DEVICE — PADDING CAST 6 IN STERILE - 6 X 4 YDS (24/CA)

## (undated) DEVICE — SET EXTENSION WITH 2 PORTS (48EA/CA) ***PART #2C8610 IS A SUBSTITUTE*****

## (undated) DEVICE — SYRINGE 10 ML CONTROL LL (25EA/BX 4BX/CA)

## (undated) DEVICE — Device

## (undated) DEVICE — TUBE E-T HI-LO CUFF 8.0MM (10EA/PK)

## (undated) DEVICE — SUTURE 0 VICRYL PLUS CT-1 - 36 INCH (36/BX)

## (undated) DEVICE — DRILL BIT 3.5X230MM AO STERILE

## (undated) DEVICE — DRESSING TRANSPARENT FILM TEGADERM 4 X 4.75" (50EA/BX)"

## (undated) DEVICE — CANISTER SUCTION 3000ML MECHANICAL FILTER AUTO SHUTOFF MEDI-VAC NONSTERILE LF DISP  (40EA/CA)

## (undated) DEVICE — GLOVE SZ 6.5 BIOGEL PI MICRO - PF LF (50PR/BX)

## (undated) DEVICE — ELECTRODE DUAL RETURN W/ CORD - (50/PK)

## (undated) DEVICE — SUTURE 2-0 VICRYL PLUS CT-1 - 8 X 18 INCH(12/BX)

## (undated) DEVICE — TUBING CLEARLINK DUO-VENT - C-FLO (48EA/CA)

## (undated) DEVICE — K-WIRE 3MMX285MM STERILE

## (undated) DEVICE — GUIDE WIRE SMOOTH TIP 2.2X800MM STERILE

## (undated) DEVICE — BIT DRILL DIA2MM SCALED FOR VARIAX 2 WRIST FUSION LOCKING PLATE SYSTEM

## (undated) DEVICE — SET LEADWIRE 5 LEAD BEDSIDE DISPOSABLE ECG (1SET OF 5/EA)

## (undated) DEVICE — GLOVE BIOGEL PI INDICATOR SZ 6.5 SURGICAL PF LF - (50/BX 4BX/CA)

## (undated) DEVICE — GLOVE SZ 7.5 BIOGEL PI MICRO - PF LF (50PR/BX)